# Patient Record
Sex: MALE | Race: WHITE | Employment: FULL TIME | URBAN - METROPOLITAN AREA
[De-identification: names, ages, dates, MRNs, and addresses within clinical notes are randomized per-mention and may not be internally consistent; named-entity substitution may affect disease eponyms.]

---

## 2018-02-09 ENCOUNTER — OFFICE VISIT (OUTPATIENT)
Dept: FAMILY MEDICINE CLINIC | Age: 53
End: 2018-02-09
Payer: COMMERCIAL

## 2018-02-09 VITALS
BODY MASS INDEX: 44.48 KG/M2 | HEIGHT: 65 IN | WEIGHT: 267 LBS | TEMPERATURE: 97.8 F | SYSTOLIC BLOOD PRESSURE: 168 MMHG | HEART RATE: 104 BPM | DIASTOLIC BLOOD PRESSURE: 82 MMHG | RESPIRATION RATE: 16 BRPM

## 2018-02-09 DIAGNOSIS — R80.9 TYPE 2 DIABETES MELLITUS WITH MICROALBUMINURIA, WITH LONG-TERM CURRENT USE OF INSULIN (HCC): Chronic | ICD-10-CM

## 2018-02-09 DIAGNOSIS — Z12.5 SCREENING PSA (PROSTATE SPECIFIC ANTIGEN): ICD-10-CM

## 2018-02-09 DIAGNOSIS — E11.69 ERECTILE DYSFUNCTION ASSOCIATED WITH TYPE 2 DIABETES MELLITUS (HCC): ICD-10-CM

## 2018-02-09 DIAGNOSIS — Z79.4 CONTROLLED TYPE 2 DIABETES MELLITUS WITHOUT COMPLICATION, WITH LONG-TERM CURRENT USE OF INSULIN (HCC): ICD-10-CM

## 2018-02-09 DIAGNOSIS — E11.9 CONTROLLED TYPE 2 DIABETES MELLITUS WITHOUT COMPLICATION, WITH LONG-TERM CURRENT USE OF INSULIN (HCC): ICD-10-CM

## 2018-02-09 DIAGNOSIS — F32.89 OTHER DEPRESSION: ICD-10-CM

## 2018-02-09 DIAGNOSIS — Z87.11 HISTORY OF PEPTIC ULCER DISEASE: ICD-10-CM

## 2018-02-09 DIAGNOSIS — Z00.00 ANNUAL PHYSICAL EXAM: Primary | ICD-10-CM

## 2018-02-09 DIAGNOSIS — E11.29 TYPE 2 DIABETES MELLITUS WITH MICROALBUMINURIA, WITH LONG-TERM CURRENT USE OF INSULIN (HCC): Chronic | ICD-10-CM

## 2018-02-09 DIAGNOSIS — Z12.11 SCREEN FOR COLON CANCER: ICD-10-CM

## 2018-02-09 DIAGNOSIS — E78.5 HYPERLIPIDEMIA, UNSPECIFIED HYPERLIPIDEMIA TYPE: Chronic | ICD-10-CM

## 2018-02-09 DIAGNOSIS — E11.42 TYPE 2 DIABETES MELLITUS WITH PERIPHERAL NEUROPATHY (HCC): Chronic | ICD-10-CM

## 2018-02-09 DIAGNOSIS — Z79.4 TYPE 2 DIABETES MELLITUS WITH MICROALBUMINURIA, WITH LONG-TERM CURRENT USE OF INSULIN (HCC): Chronic | ICD-10-CM

## 2018-02-09 DIAGNOSIS — G47.61 PERIODIC LIMB MOVEMENT DISORDER: ICD-10-CM

## 2018-02-09 DIAGNOSIS — I10 UNCONTROLLED HYPERTENSION, STAGE 1: ICD-10-CM

## 2018-02-09 DIAGNOSIS — N52.1 ERECTILE DYSFUNCTION ASSOCIATED WITH TYPE 2 DIABETES MELLITUS (HCC): ICD-10-CM

## 2018-02-09 PROCEDURE — 99386 PREV VISIT NEW AGE 40-64: CPT | Performed by: FAMILY MEDICINE

## 2018-02-09 PROCEDURE — 93000 ELECTROCARDIOGRAM COMPLETE: CPT | Performed by: FAMILY MEDICINE

## 2018-02-09 RX ORDER — FLUOXETINE HYDROCHLORIDE 20 MG/1
20 CAPSULE ORAL DAILY
COMMUNITY
End: 2018-02-09 | Stop reason: SDUPTHER

## 2018-02-09 RX ORDER — GLYBURIDE 5 MG/1
TABLET ORAL
Qty: 360 TABLET | Refills: 1 | Status: CANCELLED | OUTPATIENT
Start: 2018-02-09

## 2018-02-09 RX ORDER — SILDENAFIL CITRATE 20 MG/1
TABLET ORAL
Qty: 120 TABLET | Refills: 1 | Status: SHIPPED | OUTPATIENT
Start: 2018-02-09 | End: 2018-11-05 | Stop reason: SDUPTHER

## 2018-02-09 RX ORDER — LISINOPRIL 10 MG/1
TABLET ORAL
Qty: 90 TABLET | Refills: 1 | Status: SHIPPED | OUTPATIENT
Start: 2018-02-09 | End: 2018-07-09 | Stop reason: SDUPTHER

## 2018-02-09 RX ORDER — BLOOD SUGAR DIAGNOSTIC
STRIP MISCELLANEOUS
Qty: 200 EACH | Refills: 1 | Status: CANCELLED | OUTPATIENT
Start: 2018-02-09

## 2018-02-09 RX ORDER — CYCLOBENZAPRINE HCL 10 MG
10 TABLET ORAL 3 TIMES DAILY PRN
Qty: 270 TABLET | Refills: 1 | Status: CANCELLED | OUTPATIENT
Start: 2018-02-09

## 2018-02-09 RX ORDER — CYANOCOBALAMIN (VITAMIN B-12) 500 MCG
1 LOZENGE ORAL EVERY OTHER DAY
COMMUNITY
End: 2020-04-08 | Stop reason: CLARIF

## 2018-02-09 RX ORDER — FLUOXETINE HYDROCHLORIDE 20 MG/1
20 CAPSULE ORAL DAILY
Qty: 90 CAPSULE | Refills: 1 | Status: SHIPPED | OUTPATIENT
Start: 2018-02-09 | End: 2018-05-16

## 2018-02-09 RX ORDER — CYCLOBENZAPRINE HCL 10 MG
10 TABLET ORAL 3 TIMES DAILY PRN
COMMUNITY
End: 2018-02-09

## 2018-02-09 RX ORDER — RANITIDINE 150 MG/1
150 TABLET ORAL 2 TIMES DAILY
COMMUNITY
End: 2018-02-09

## 2018-02-09 RX ORDER — CETIRIZINE HYDROCHLORIDE 10 MG/1
10 TABLET ORAL DAILY PRN
COMMUNITY
End: 2019-09-25

## 2018-02-09 RX ORDER — OMEPRAZOLE 20 MG/1
CAPSULE, DELAYED RELEASE ORAL
Qty: 180 CAPSULE | Refills: 1 | Status: SHIPPED | OUTPATIENT
Start: 2018-02-09 | End: 2018-11-05 | Stop reason: SDUPTHER

## 2018-02-09 RX ORDER — PRAVASTATIN SODIUM 40 MG
TABLET ORAL
Qty: 90 TABLET | Refills: 1 | Status: SHIPPED | OUTPATIENT
Start: 2018-02-09 | End: 2018-08-09 | Stop reason: SDUPTHER

## 2018-02-09 RX ORDER — DOXYCYCLINE 100 MG/1
100 CAPSULE ORAL 2 TIMES DAILY
Qty: 180 CAPSULE | Refills: 1 | Status: SHIPPED | OUTPATIENT
Start: 2018-02-09 | End: 2018-07-23 | Stop reason: SDUPTHER

## 2018-02-09 RX ORDER — CHOLECALCIFEROL (VITAMIN D3) 25 MCG
TABLET,CHEWABLE ORAL
COMMUNITY
End: 2018-07-16

## 2018-02-09 RX ORDER — PRAMIPEXOLE DIHYDROCHLORIDE 0.12 MG/1
0.5 TABLET ORAL DAILY
Qty: 360 TABLET | Refills: 1 | Status: SHIPPED | OUTPATIENT
Start: 2018-02-09 | End: 2018-05-16

## 2018-02-09 RX ORDER — DOXYCYCLINE 100 MG/1
100 CAPSULE ORAL 2 TIMES DAILY
COMMUNITY
End: 2018-02-09 | Stop reason: SDUPTHER

## 2018-02-09 RX ORDER — MULTIVITAMIN/IRON/FOLIC ACID 18MG-0.4MG
TABLET ORAL
COMMUNITY
End: 2019-02-07

## 2018-02-09 RX ORDER — LANCETS 28 GAUGE
1 EACH MISCELLANEOUS DAILY
COMMUNITY
End: 2018-07-16

## 2018-02-09 ASSESSMENT — PATIENT HEALTH QUESTIONNAIRE - PHQ9
SUM OF ALL RESPONSES TO PHQ QUESTIONS 1-9: 0
1. LITTLE INTEREST OR PLEASURE IN DOING THINGS: 0
2. FEELING DOWN, DEPRESSED OR HOPELESS: 0
SUM OF ALL RESPONSES TO PHQ9 QUESTIONS 1 & 2: 0

## 2018-02-09 NOTE — PROGRESS NOTES
Subjective:      Patient ID: Margot Cook is a 47 y.o. male. Chief Complaint   Patient presents with   [facility]     Previous PCP was Dr Gaston Jordan with [facility]. Needed to switch because he is now a [facility] [address]. Referred here by his mom Mandy Morales.  Annual Exam     He is not fasting for BW. Does not watch diet. Does not exercise. CRC screen - never. HPI    Kiel Escobedo is here today for annual exam and establish    He was seeing other physicians at [facility] and didn't feel like he was being listened to      His diabetic care never been good his last hemoglobin A1c before meals is over 12      We did review a lot of his health maintenance issues and past history and care everywhere    He is not fasting for blood work does not watch his diet and does not exercise as there had a screening for colorectal cancer      No shortness of breath with activities no chest pain with activities no nausea no vomiting no black stool velocity is systems negative  Past medical surgical history also        Reviewed every medication        No Known Allergies  Outpatient Encounter Prescriptions as of 2/9/2018   Medication Sig Dispense Refill    VITAMIN E PO Take by mouth      Magnesium Oxide 250 MG TABS Take by mouth      Cyanocobalamin (B-12) 2500 MCG TABS Take by mouth      Multiple Vitamins-Minerals (ONE-A-DAY MENS 50+ ADVANTAGE PO) Take by mouth      Cholecalciferol (VITAMIN D3) 1000 units CAPS Take by mouth      cetirizine (ZYRTEC) 10 MG tablet Take 10 mg by mouth daily      FREESTYLE LANCETS MISC 1 each by Does not apply route daily      glucose blood VI test strips (FREESTYLE TEST STRIPS) strip 1 each by In Vitro route daily As needed.       FLUoxetine (PROZAC) 20 MG capsule Take 1 capsule by mouth daily 90 capsule 1    doxycycline monohydrate (MONODOX) 100 MG capsule Take 1 capsule by mouth 2 times daily 180 capsule 1    pravastatin (PRAVACHOL) 40 MG tablet TAKE ONE TABLET BY MOUTH ONE TABLET BY MOUTH ONE TIME DAILY 90 tablet 3    [DISCONTINUED] insulin 70-30 (HUMULIN;NOVOLIN) (70-30) 100 UNIT/ML injection INJECT 30 UNITS BEFORE BREAKFAST AND 30 UNITS BEFORE DINNER 4 vial 3    [DISCONTINUED] glyBURIDE (DIABETA) 5 MG tablet TAKE 2 TABLETS BY MOUTH TWICE DAILY FOR DIABETES 360 tablet 3    [DISCONTINUED] metFORMIN (GLUCOPHAGE) 1000 MG tablet Take one tablet (1000mg) by mouth twice daily (note this is same dose as before, just different strength tablet) 120 tablet 3    [DISCONTINUED] omeprazole (PRILOSEC) 20 MG capsule TAKE 1 CAPSULE BY MOUTH TWICE DAILY FOR STOMACH 180 capsule 3    [DISCONTINUED] sildenafil (REVATIO) 20 MG tablet TAKE 5 TABLETS AS NEEDED FOR ERECTILE DYSFUNCTION 120 tablet 0    [DISCONTINUED] Glucose Blood (BLOOD GLUCOSE TEST STRIPS) STRP USE AS DIRECTED FOR DIABETES 100 5     No facility-administered encounter medications on file as of 2/9/2018. Social History     Social History    Marital status: Unknown     Spouse name: N/A    Number of children: N/A    Years of education: N/A     Occupational History    Not on file. Social History Main Topics    Smoking status: Current Every Day Smoker     Packs/day: 1.00    Smokeless tobacco: Never Used    Alcohol use No    Drug use: No    Sexual activity: Not on file     Other Topics Concern    Not on file     Social History Narrative    No narrative on file     No family history on file. No past medical history on file. No past surgical history on file. REVIEW OF SYSTEMS:   Patient seen today for exam.  Denies any problems with hearing, headaches or vision. Denies any shortness of breath, chest pain, nausea or vomiting. No black stool, no blood in the stool. No heartburn. Denies any problems with constipation or diarrhea either. No dysuria type symptoms.               Objective:     BP (!) 168/82 (Site: Left Arm, Position: Sitting, Cuff Size: Medium Adult)   Pulse 104   Temp 97.8 °F (36.6 °C) (Temporal) Resp 16   Ht 5' 5\" (1.651 m)   Wt 267 lb (121.1 kg)   BMI 44.43 kg/m²     Physical Exam        O:  Alert and active male in no acute distress  HEENT:  TMs clear. Pharynx neg. Nares clear, no drainage noted  Neck supple/ no adenopathy   HEART:  RRR without murmur/ no carotid bruits  LUNGS:  Clear to auscultation bilaterally, no wheeze or rhonchi noted  THYROID: neg masses or nodularity  ABDOMEN:  Soft x4. Bowel sounds positive. No masses or organomegaly,  Negative tenderness, guarding or rebound. EXTR:  Without edema./ good pulses bilat    Neurologic exam unremarkable. DTRs in upper and lower extremities within normal limits. Full strength noted    Skin- no lesions noted       DIABETIC FOOT EXAM:  Bilateral feet were examined here today with normal pulses anteriorly and posteriorly at the dorsalis pedis. No callus, fissuring or sores noted. Sensation intact. Fine pin prick testing was within normal limits    Assessment:      1. Annual physical exam  Lipid Panel    CBC With Auto Differential    Comprehensive Metabolic Panel    EKG 12 lead   2. Type 2 diabetes mellitus with microalbuminuria, with long-term current use of insulin (HCC)  Hemoglobin A1C    Microalbumin / Creatinine Urine Ratio   3. Hyperlipidemia, unspecified hyperlipidemia type     4. Screening PSA (prostate specific antigen)  PSA Screening   5. Screen for colon cancer  COLOGUARD   6. Type 2 diabetes mellitus with peripheral neuropathy (HCC)  pravastatin (PRAVACHOL) 40 MG tablet    lisinopril (PRINIVIL;ZESTRIL) 10 MG tablet   7. Periodic limb movement disorder  pramipexole (MIRAPEX) 0.125 MG tablet   8. Uncontrolled type 2 diabetes mellitus without complication, with long-term current use of insulin ([address]     9. Controlled type 2 diabetes mellitus without complication, with long-term current use of insulin ([facility])  metFORMIN (GLUCOPHAGE) 1000 MG tablet   10.  History of peptic ulcer disease  omeprazole (PRILOSEC) 20 MG delayed release capsule

## 2018-02-10 DIAGNOSIS — E11.29 TYPE 2 DIABETES MELLITUS WITH MICROALBUMINURIA, WITH LONG-TERM CURRENT USE OF INSULIN (HCC): Chronic | ICD-10-CM

## 2018-02-10 DIAGNOSIS — Z79.4 TYPE 2 DIABETES MELLITUS WITH MICROALBUMINURIA, WITH LONG-TERM CURRENT USE OF INSULIN (HCC): Chronic | ICD-10-CM

## 2018-02-10 DIAGNOSIS — Z12.5 SCREENING PSA (PROSTATE SPECIFIC ANTIGEN): ICD-10-CM

## 2018-02-10 DIAGNOSIS — Z00.00 ANNUAL PHYSICAL EXAM: ICD-10-CM

## 2018-02-10 DIAGNOSIS — R80.9 TYPE 2 DIABETES MELLITUS WITH MICROALBUMINURIA, WITH LONG-TERM CURRENT USE OF INSULIN (HCC): Chronic | ICD-10-CM

## 2018-02-10 LAB
ALBUMIN SERPL-MCNC: 4.4 G/DL (ref 3.9–4.9)
ALP BLD-CCNC: 59 U/L (ref 35–104)
ALT SERPL-CCNC: 29 U/L (ref 0–41)
ANION GAP SERPL CALCULATED.3IONS-SCNC: 14 MEQ/L (ref 7–13)
AST SERPL-CCNC: 21 U/L (ref 0–40)
BASOPHILS ABSOLUTE: 0.1 K/UL (ref 0–0.2)
BASOPHILS RELATIVE PERCENT: 1 %
BILIRUB SERPL-MCNC: 0.4 MG/DL (ref 0–1.2)
BUN BLDV-MCNC: 13 MG/DL (ref 6–20)
CALCIUM SERPL-MCNC: 9.5 MG/DL (ref 8.6–10.2)
CHLORIDE BLD-SCNC: 99 MEQ/L (ref 98–107)
CHOLESTEROL, TOTAL: 144 MG/DL (ref 0–199)
CO2: 25 MEQ/L (ref 22–29)
CREAT SERPL-MCNC: 0.58 MG/DL (ref 0.7–1.2)
CREATININE URINE: 117.5 MG/DL
EOSINOPHILS ABSOLUTE: 0.3 K/UL (ref 0–0.7)
EOSINOPHILS RELATIVE PERCENT: 2.8 %
GFR AFRICAN AMERICAN: >60
GFR NON-AFRICAN AMERICAN: >60
GLOBULIN: 2.6 G/DL (ref 2.3–3.5)
GLUCOSE BLD-MCNC: 147 MG/DL (ref 74–109)
HBA1C MFR BLD: 10.1 % (ref 4.8–5.9)
HCT VFR BLD CALC: 51.7 % (ref 42–52)
HDLC SERPL-MCNC: 36 MG/DL (ref 40–59)
HEMOGLOBIN: 17.5 G/DL (ref 14–18)
LDL CHOLESTEROL CALCULATED: 70 MG/DL (ref 0–129)
LYMPHOCYTES ABSOLUTE: 3 K/UL (ref 1–4.8)
LYMPHOCYTES RELATIVE PERCENT: 28.6 %
MCH RBC QN AUTO: 30.3 PG (ref 27–31.3)
MCHC RBC AUTO-ENTMCNC: 33.8 % (ref 33–37)
MCV RBC AUTO: 89.6 FL (ref 80–100)
MICROALBUMIN UR-MCNC: 16.6 MG/DL
MICROALBUMIN/CREAT UR-RTO: 141.3 MG/G (ref 0–30)
MONOCYTES ABSOLUTE: 1 K/UL (ref 0.2–0.8)
MONOCYTES RELATIVE PERCENT: 9.1 %
NEUTROPHILS ABSOLUTE: 6.1 K/UL (ref 1.4–6.5)
NEUTROPHILS RELATIVE PERCENT: 58.5 %
PDW BLD-RTO: 14.1 % (ref 11.5–14.5)
PLATELET # BLD: 208 K/UL (ref 130–400)
POTASSIUM SERPL-SCNC: 4.5 MEQ/L (ref 3.5–5.1)
PROSTATE SPECIFIC ANTIGEN: 0.2 NG/ML (ref 0–3.89)
RBC # BLD: 5.77 M/UL (ref 4.7–6.1)
SODIUM BLD-SCNC: 138 MEQ/L (ref 132–144)
TOTAL PROTEIN: 7 G/DL (ref 6.4–8.1)
TRIGL SERPL-MCNC: 191 MG/DL (ref 0–200)
WBC # BLD: 10.5 K/UL (ref 4.8–10.8)

## 2018-02-13 ENCOUNTER — CARE COORDINATION (OUTPATIENT)
Dept: CARE COORDINATION | Age: 53
End: 2018-02-13

## 2018-02-13 NOTE — CARE COORDINATION
patient can engage in healthcare discussions? (Barriers include language, deafness, aphasia, alcohol or drug problems, learning difficulties, concentration):  Clear and open communication, no identified barriers   Do other services need to be involved to help this patient?:  Other care/services not required at this time   Are current services involved with this patient well-coordinated? (Include coordination with other services you are now recommendation): All required care/services in place and well-coordinated   Suggested Interventions and Community Resources  Diabetes Education:  Completed (Comment: approx. 8 yrs ago)    Other Services or Interventions:  Provided with and reviewed [address] [place] [address] Program information/benefits   Zone Management Tools: In Process         Set up/Review Goals, Set up/Review an Education Plan              Prior to Admission medications    Medication Sig Start Date End Date Taking?  Authorizing Provider   VITAMIN E PO Take by mouth   Yes Historical Provider, MD   Magnesium Oxide 250 MG TABS Take by mouth   Yes Historical Provider, MD   Cyanocobalamin (B-12) 2500 MCG TABS Take by mouth   Yes Historical Provider, MD   Multiple Vitamins-Minerals (ONE-A-DAY MENS 50+ ADVANTAGE PO) Take by mouth   Yes Historical Provider, MD   Cholecalciferol (VITAMIN D3) 1000 units CAPS Take by mouth   Yes Historical Provider, MD   cetirizine (ZYRTEC) 10 MG tablet Take 10 mg by mouth daily   Yes Historical Provider, MD   FLUoxetine (PROZAC) 20 MG capsule Take 1 capsule by mouth daily 2/9/18  Yes Rahul Lundberg, DO   doxycycline monohydrate (MONODOX) 100 MG capsule Take 1 capsule by mouth 2 times daily 2/9/18  Yes Meng Kapoor, DO   pravastatin (PRAVACHOL) 40 MG tablet TAKE ONE TABLET BY MOUTH ONE TIME A DAY AT BEDTIME FOR CHOLESTEROL 2/9/18  Yes Meng Kapoor DO   pramipexole (MIRAPEX) 0.125 MG tablet Take 4 tablets by mouth daily 2/9/18  Yes Meng Kapoor, DO   lisinopril

## 2018-04-11 ENCOUNTER — CARE COORDINATION (OUTPATIENT)
Dept: CARE COORDINATION | Age: 53
End: 2018-04-11

## 2018-04-30 ENCOUNTER — CLINICAL DOCUMENTATION (OUTPATIENT)
Dept: PHARMACY | Facility: CLINIC | Age: 53
End: 2018-04-30

## 2018-05-16 ENCOUNTER — OFFICE VISIT (OUTPATIENT)
Dept: FAMILY MEDICINE CLINIC | Age: 53
End: 2018-05-16
Payer: COMMERCIAL

## 2018-05-16 VITALS
HEART RATE: 88 BPM | HEIGHT: 65 IN | BODY MASS INDEX: 44.48 KG/M2 | RESPIRATION RATE: 18 BRPM | SYSTOLIC BLOOD PRESSURE: 134 MMHG | TEMPERATURE: 98.1 F | WEIGHT: 267 LBS | DIASTOLIC BLOOD PRESSURE: 72 MMHG

## 2018-05-16 DIAGNOSIS — E11.29 TYPE 2 DIABETES MELLITUS WITH MICROALBUMINURIA, WITH LONG-TERM CURRENT USE OF INSULIN (HCC): Primary | Chronic | ICD-10-CM

## 2018-05-16 DIAGNOSIS — R80.9 TYPE 2 DIABETES MELLITUS WITH MICROALBUMINURIA, WITH LONG-TERM CURRENT USE OF INSULIN (HCC): Primary | Chronic | ICD-10-CM

## 2018-05-16 DIAGNOSIS — Z79.4 TYPE 2 DIABETES MELLITUS WITH MICROALBUMINURIA, WITH LONG-TERM CURRENT USE OF INSULIN (HCC): Primary | Chronic | ICD-10-CM

## 2018-05-16 LAB — HBA1C MFR BLD: 9.8 %

## 2018-05-16 PROCEDURE — 99213 OFFICE O/P EST LOW 20 MIN: CPT | Performed by: FAMILY MEDICINE

## 2018-05-16 PROCEDURE — 83036 HEMOGLOBIN GLYCOSYLATED A1C: CPT | Performed by: FAMILY MEDICINE

## 2018-06-05 DIAGNOSIS — E11.69 ERECTILE DYSFUNCTION ASSOCIATED WITH TYPE 2 DIABETES MELLITUS (HCC): ICD-10-CM

## 2018-06-05 DIAGNOSIS — Z87.11 HISTORY OF PEPTIC ULCER DISEASE: ICD-10-CM

## 2018-06-05 DIAGNOSIS — N52.1 ERECTILE DYSFUNCTION ASSOCIATED WITH TYPE 2 DIABETES MELLITUS (HCC): ICD-10-CM

## 2018-06-05 DIAGNOSIS — E11.42 TYPE 2 DIABETES MELLITUS WITH PERIPHERAL NEUROPATHY (HCC): Chronic | ICD-10-CM

## 2018-06-05 RX ORDER — OMEPRAZOLE 20 MG/1
CAPSULE, DELAYED RELEASE ORAL
Qty: 180 CAPSULE | Refills: 1 | Status: CANCELLED | OUTPATIENT
Start: 2018-06-05

## 2018-06-05 RX ORDER — DOXYCYCLINE 100 MG/1
100 CAPSULE ORAL 2 TIMES DAILY
Qty: 180 CAPSULE | Refills: 1 | Status: CANCELLED | OUTPATIENT
Start: 2018-06-05

## 2018-06-05 RX ORDER — LISINOPRIL 10 MG/1
TABLET ORAL
Qty: 90 TABLET | Refills: 1 | Status: CANCELLED | OUTPATIENT
Start: 2018-06-05

## 2018-06-05 RX ORDER — SILDENAFIL CITRATE 20 MG/1
TABLET ORAL
Qty: 120 TABLET | Refills: 1 | Status: CANCELLED | OUTPATIENT
Start: 2018-06-05

## 2018-06-05 RX ORDER — PRAVASTATIN SODIUM 40 MG
TABLET ORAL
Qty: 90 TABLET | Refills: 1 | Status: CANCELLED | OUTPATIENT
Start: 2018-06-05

## 2018-06-27 ENCOUNTER — CLINICAL DOCUMENTATION (OUTPATIENT)
Dept: PHARMACY | Facility: CLINIC | Age: 53
End: 2018-06-27

## 2018-06-27 ENCOUNTER — ENROLLMENT (OUTPATIENT)
Dept: PHARMACY | Facility: CLINIC | Age: 53
End: 2018-06-27

## 2018-07-09 DIAGNOSIS — E11.42 TYPE 2 DIABETES MELLITUS WITH PERIPHERAL NEUROPATHY (HCC): Chronic | ICD-10-CM

## 2018-07-09 RX ORDER — LISINOPRIL 10 MG/1
TABLET ORAL
Qty: 90 TABLET | Refills: 1 | Status: SHIPPED | OUTPATIENT
Start: 2018-07-09 | End: 2018-11-05 | Stop reason: SDUPTHER

## 2018-07-13 ENCOUNTER — CARE COORDINATION (OUTPATIENT)
Dept: CARE COORDINATION | Age: 53
End: 2018-07-13

## 2018-07-13 NOTE — CARE COORDINATION
Risk Screening, did the patient have 2 or more falls or 1 fall with injury in the past year?:  No     Frequent urination at night?:  No  Do you use rails/bars?:  No  Do you have a non-slip tub mat?:  Yes     Are you experiencing loss of meaning?:  No  Are you experiencing loss of hope and peace?:  No     Thinking about your patient's physical health needs, are there any symptoms or problems (risk indicators) you are unsure about that require further investigation?:  No identified areas of uncertainly or problems already being investigated   Are the patients physical health problems impacting on their mental well-being?:  No identified areas of concern   Are there any problems with your patients lifestyle behaviors (alcohol, drugs, diet, exercise) that are impacting on physical or mental well-being?:  No identified areas of concern   Do you have any other concerns about your patients mental well-being?  How would you rate their severity and impact on the patient?:  No identified areas of concern   How would you rate their home environment in terms of safety and stability (including domestic violence, insecure housing, neighbor harassment)?:  Consistently safe, supportive, stable, no identified problems   How do daily activities impact on the patient's well-being? (include current or anticipated unemployment, work, caregiving, access to transportation or other):  No identified problems or perceived positive benefits   How would you rate their social network (family, work, friends)?:  Good participation with social networks   How would you rate their financial resources (including ability to afford all required medical care)?:  Financially secure, resources adequate, no identified problems   How wells does the patient now understand their health and well-being (symptoms, signs or risk factors) and what they need to do to manage their health?:  Reasonable to good understanding and already engages in managing health or is willing to undertake better management   How well do you think your patient can engage in healthcare discussions? (Barriers include language, deafness, aphasia, alcohol or drug problems, learning difficulties, concentration):  Clear and open communication, no identified barriers   Do other services need to be involved to help this patient?:  Other care/services not required at this time   Are current services involved with this patient well-coordinated? (Include coordination with other services you are now recommendation): All required care/services in place and well-coordinated   Suggested Interventions and Community Resources  Diabetes Education: In Process (Comment: Is aware will need to meet with Diabeets Educater before end of year)    Other Services or Interventions:  Provided with and reviewed [email or web address] Diabetes Management Program information/benefits   Medication Assistance Program:  In Process   Pharmacist:  In Process   Registered Dietician: In Process   Zone Management Tools:  Completed         Set up/Review Goals, Set up/Review an Education Plan              Prior to Admission medications    Medication Sig Start Date End Date Taking?  Authorizing Provider   lisinopril (PRINIVIL;ZESTRIL) 10 MG tablet TAKE 1 TABLET BY MOUTH ONE TIME DAILY 7/9/18  Yes Mariella Osman, DO   Insulin Pen Needle (NOVOFINE PLUS) 32G X 4 MM MISC 1 each by Does not apply route daily 7/9/18  Yes Meng Kapoor, DO   linagliptin-metformin 2.5-1000 MG TABS Take 1 tablet by mouth 2 times daily (replaces Tradjenta and Metformin) 7/9/18  Yes Meng Kapoor, DO   Insulin Degludec (TRESIBA FLEXTOUCH) 100 UNIT/ML SOPN 60 units each 6/6/18  Yes Meng Kapoor, DO   VITAMIN E PO Take by mouth   Yes Historical Provider, MD   Magnesium Oxide 250 MG TABS Take by mouth   Yes Historical Provider, MD   Cyanocobalamin (B-12) 2500 MCG TABS Take by mouth   Yes Historical Provider, MD   Multiple Vitamins-Minerals (ONE-A-DAY MENS 50+ concern?:  No

## 2018-07-16 ENCOUNTER — TELEPHONE (OUTPATIENT)
Dept: FAMILY MEDICINE CLINIC | Age: 53
End: 2018-07-16

## 2018-07-16 ENCOUNTER — SCHEDULED TELEPHONE ENCOUNTER (OUTPATIENT)
Dept: PHARMACY | Facility: CLINIC | Age: 53
End: 2018-07-16

## 2018-07-16 DIAGNOSIS — E11.8 TYPE 2 DIABETES MELLITUS WITH COMPLICATION, UNSPECIFIED LONG TERM INSULIN USE STATUS: ICD-10-CM

## 2018-07-16 RX ORDER — LANCETS 33 GAUGE
EACH MISCELLANEOUS
Qty: 200 EACH | Refills: 3 | Status: CANCELLED | OUTPATIENT
Start: 2018-07-16

## 2018-07-16 RX ORDER — ASPIRIN 81 MG/1
81 TABLET ORAL DAILY
Qty: 90 TABLET | Refills: 1 | Status: SHIPPED | OUTPATIENT
Start: 2018-07-16 | End: 2019-03-30 | Stop reason: SDUPTHER

## 2018-07-16 RX ORDER — ASPIRIN 81 MG/1
81 TABLET ORAL DAILY
Qty: 100 TABLET | Refills: 3 | Status: CANCELLED | OUTPATIENT
Start: 2018-07-16

## 2018-07-16 RX ORDER — BLOOD-GLUCOSE METER
EACH MISCELLANEOUS
Qty: 1 KIT | Refills: 0 | Status: CANCELLED | OUTPATIENT
Start: 2018-07-16

## 2018-07-16 RX ORDER — LANCETS 33 GAUGE
EACH MISCELLANEOUS
Qty: 300 EACH | Refills: 1 | Status: SHIPPED | OUTPATIENT
Start: 2018-07-16 | End: 2019-02-08 | Stop reason: ALTCHOICE

## 2018-07-16 RX ORDER — BLOOD-GLUCOSE METER
EACH MISCELLANEOUS
Qty: 1 DEVICE | Refills: 0 | Status: SHIPPED | OUTPATIENT
Start: 2018-07-16 | End: 2019-02-08 | Stop reason: ALTCHOICE

## 2018-07-16 NOTE — TELEPHONE ENCOUNTER
Erick Lerma DO,  Your patient is currently enrolled in [address]   Please assist, orders pended in this encounter, for your signature/modification if you agree:  · Formulary conversion: Tresiba 60 units SQ daily to Lantus 60u SQ nightly (note: Lantus OR Toujeo are eligible for copay waiver to $0 for patient through mail order pharmacy)  · Refill requests: Agamatrix Presto BG meter/strips/lancets, aspirin (eligible for copay waiver to $0 for patient through mail order pharmacy)    Thank you,  Gray Figueroa, PharmD, [address] [facility]  Direct: [phone]  Department, toll free: [phone], option 7

## 2018-07-24 RX ORDER — DOXYCYCLINE 100 MG/1
100 CAPSULE ORAL 2 TIMES DAILY
Qty: 180 CAPSULE | Refills: 1 | Status: SHIPPED | OUTPATIENT
Start: 2018-07-24 | End: 2018-08-20 | Stop reason: ALTCHOICE

## 2018-08-09 DIAGNOSIS — E11.42 TYPE 2 DIABETES MELLITUS WITH PERIPHERAL NEUROPATHY (HCC): Chronic | ICD-10-CM

## 2018-08-09 RX ORDER — PRAVASTATIN SODIUM 40 MG
TABLET ORAL
Qty: 90 TABLET | Refills: 1 | Status: SHIPPED | OUTPATIENT
Start: 2018-08-09 | End: 2018-12-31 | Stop reason: SDUPTHER

## 2018-08-09 NOTE — TELEPHONE ENCOUNTER
From: Cali Mcfarlane  Sent: 8/9/2018 7:49 AM EDT  Subject: Medication Renewal Request    Cali Mcfarlane would like a refill of the following medications:     pravastatin (PRAVACHOL) 40 MG tablet [Meng Kapoor, ]   Patient Comment: Just filled my 2 weeks pill containers and only have a few pills remaining     linagliptin-metformin 2.5-1000 MG TABS [Meng Kapoor, DO]   Patient Comment: Just filled my 2 weeks pill containers and only have a few pills remaining    Preferred pharmacy: [address] [place] [address] -  [phone]

## 2018-08-16 ENCOUNTER — OFFICE VISIT (OUTPATIENT)
Dept: FAMILY MEDICINE CLINIC | Age: 53
End: 2018-08-16
Payer: COMMERCIAL

## 2018-08-16 VITALS
DIASTOLIC BLOOD PRESSURE: 80 MMHG | OXYGEN SATURATION: 96 % | HEART RATE: 106 BPM | BODY MASS INDEX: 44.04 KG/M2 | SYSTOLIC BLOOD PRESSURE: 132 MMHG | WEIGHT: 264.31 LBS | RESPIRATION RATE: 12 BRPM | HEIGHT: 65 IN | TEMPERATURE: 96.8 F

## 2018-08-16 DIAGNOSIS — Z79.4 TYPE 2 DIABETES MELLITUS WITH MICROALBUMINURIA, WITH LONG-TERM CURRENT USE OF INSULIN (HCC): Primary | Chronic | ICD-10-CM

## 2018-08-16 DIAGNOSIS — E11.29 TYPE 2 DIABETES MELLITUS WITH MICROALBUMINURIA, WITH LONG-TERM CURRENT USE OF INSULIN (HCC): Primary | Chronic | ICD-10-CM

## 2018-08-16 DIAGNOSIS — R80.9 TYPE 2 DIABETES MELLITUS WITH MICROALBUMINURIA, WITH LONG-TERM CURRENT USE OF INSULIN (HCC): Primary | Chronic | ICD-10-CM

## 2018-08-16 LAB — HBA1C MFR BLD: 9.5 %

## 2018-08-16 PROCEDURE — 83036 HEMOGLOBIN GLYCOSYLATED A1C: CPT | Performed by: FAMILY MEDICINE

## 2018-08-16 PROCEDURE — 99213 OFFICE O/P EST LOW 20 MIN: CPT | Performed by: FAMILY MEDICINE

## 2018-08-16 RX ORDER — AMOXICILLIN AND CLAVULANATE POTASSIUM 875; 125 MG/1; MG/1
1 TABLET, FILM COATED ORAL 2 TIMES DAILY
Qty: 20 TABLET | Refills: 1 | Status: SHIPPED | OUTPATIENT
Start: 2018-08-16 | End: 2018-08-26

## 2018-08-16 NOTE — PROGRESS NOTES
Subjective:      Patient ID: Parvin Warren is a 47 y.o. male. Chief Complaint   Patient presents with    Diabetes     Taking medications as prescribed. Watches diet. Checks glucose at home occasionally. HPI    Here today follow-up his diabetes take his medication as prescribed and does watch his diet      Unfortunately his A1c was not as good as we want he did not hear the instructions were gave him last time so hasn't changed anything he did go from [facility] about 2 Lantus and has made things worse     no shortness of breath chest pain nausea vomiting       No Known Allergies  Outpatient Encounter Prescriptions as of 8/16/2018   Medication Sig Dispense Refill    amoxicillin-clavulanate (AUGMENTIN) 875-125 MG per tablet Take 1 tablet by mouth 2 times daily for 10 days 20 tablet 1    empagliflozin (JARDIANCE) 10 MG tablet Take 1 tablet by mouth daily 30 tablet 3    pravastatin (PRAVACHOL) 40 MG tablet TAKE ONE TABLET BY MOUTH ONE TIME A DAY AT BEDTIME FOR CHOLESTEROL 90 tablet 1    linagliptin-metformin 2.5-1000 MG TABS Take 1 tablet by mouth 2 times daily (replaces Tradjenta and Metformin) 60 tablet 3    doxycycline monohydrate (MONODOX) 100 MG capsule Take 1 capsule by mouth 2 times daily 180 capsule 1    aspirin EC 81 MG EC tablet Take 1 tablet by mouth daily 90 tablet 1    insulin glargine (LANTUS SOLOSTAR) 100 UNIT/ML injection pen Inject 60 Units into the skin nightly 15 pen 1    Blood Glucose Monitoring Suppl (AGAMATRIX AMP) MEÑO Checks 2-3 times daily. IDDM--ICD-10 E11.9 1 Device 0    blood glucose test strips (AGAMATRIX AMP TEST) strip Checks 2-3 times daily. IDDM--ICD-10 E11.9 300 each 1    AGAMATRIX ULTRA-THIN LANCETS MISC Checks 2-3 times daily.  IDDM--ICD-10 E11.9 300 each 1    lisinopril (PRINIVIL;ZESTRIL) 10 MG tablet TAKE 1 TABLET BY MOUTH ONE TIME DAILY 90 tablet 1    Insulin Pen Needle (NOVOFINE PLUS) 32G X 4 MM MISC 1 each by Does not apply route daily 200 each 1    VITAMIN E PO Take by mouth      Magnesium Oxide 250 MG TABS Take by mouth      cetirizine (ZYRTEC) 10 MG tablet Take 10 mg by mouth daily      omeprazole (PRILOSEC) 20 MG delayed release capsule TAKE 1 CAPSULE BY MOUTH TWICE DAILY FOR STOMACH 180 capsule 1    sildenafil (REVATIO) 20 MG tablet TAKE 5 TABLETS AS NEEDED FOR ERECTILE DYSFUNCTION 120 tablet 1     No facility-administered encounter medications on file as of 8/16/2018. Social History     Social History    Marital status: Unknown     Spouse name: N/A    Number of children: N/A    Years of education: N/A     Occupational History    Not on file. Social History Main Topics    Smoking status: Current Every Day Smoker     Packs/day: 1.00    Smokeless tobacco: Never Used    Alcohol use No    Drug use: No    Sexual activity: Not on file     Other Topics Concern    Not on file     Social History Narrative    No narrative on file     History reviewed. No pertinent family history. History reviewed. No pertinent past medical history. History reviewed. No pertinent surgical history. REVIEW OF SYSTEMS:   Patient seen today for exam.  Denies any problems with hearing, headaches or vision. Denies any shortness of breath, chest pain, nausea or vomiting. No black stool, no blood in the stool. No heartburn. Denies any problems with constipation or diarrhea either. No dysuria type symptoms. Objective:     /80 (Site: Left Arm, Position: Sitting, Cuff Size: Large Adult)   Pulse 106   Temp 96.8 °F (36 °C) (Temporal)   Resp 12   Ht 5' 5\" (1.651 m)   Wt 264 lb 5 oz (119.9 kg)   SpO2 96%   BMI 43.98 kg/m²     Physical Exam        O:  Alert and active male in no acute distress  HEENT:  TMs clear. Pharynx neg.  Nares clear, no drainage noted  Neck supple/ no adenopathy   HEART:  RRR without murmur/ no carotid bruits  LUNGS:  Clear to auscultation bilaterally, no wheeze or rhonchi noted  THYROID: neg masses or nodularity  ABDOMEN: Soft x4. Bowel sounds positive. No masses or organomegaly,  Negative tenderness, guarding or rebound. EXTR:  Without edema./ good pulses bilat    Neurologic exam unremarkable. DTRs in upper and lower extremities within normal limits. Full strength noted    Skin- no lesions noted       Assessment:       Diagnosis Orders   1. Type 2 diabetes mellitus with microalbuminuria, with long-term current use of insulin (HCC)  POCT glycosylated hemoglobin (Hb A1C)             Plan:        Orders Placed This Encounter   Medications    amoxicillin-clavulanate (AUGMENTIN) 875-125 MG per tablet     Sig: Take 1 tablet by mouth 2 times daily for 10 days     Dispense:  20 tablet     Refill:  1    empagliflozin (JARDIANCE) 10 MG tablet     Sig: Take 1 tablet by mouth daily     Dispense:  30 tablet     Refill:  3     Orders Placed This Encounter   Procedures    POCT glycosylated hemoglobin (Hb A1C)           Health Maintenance Due   Topic Date Due    Diabetic foot exam  09/05/1976    HIV screen  09/05/1981    Shingles Vaccine (1 of 2 - 2 Dose Series) 09/05/2016    Colon cancer screen colonoscopy  09/05/2016        will add Jardiance everyday living doesn't next 2 weeks sooner if any problems      Controlled Substances Monitoring:     No flowsheet data found.         If anything worsens or changes please call us at once , follow-up in the office as planned,

## 2018-08-16 NOTE — PATIENT INSTRUCTIONS
smoking, you improve the health of everyone who now breathes in your smoke. · Their heart, lung, and cancer risks drop, much like yours. · They are sick less. For babies and small children, living smoke-free means they're less likely to have ear infections, pneumonia, and bronchitis. · If you're a woman who is or will be pregnant someday, quitting smoking means a healthier . · Children who are close to you are less likely to become adult smokers. Where can you learn more? Go to https://[employer].[place] SPARK. org and sign in to your [employer] account. Enter [phone] in the [facility] box to learn more about \"Learning About Benefits From Quitting Smoking. \"     If you do not have an account, please click on the \"Sign Up Now\" link. Current as of: 2017  Content Version: 11.7  © 2252-4457 [email or web address]. Care instructions adapted under license by [facility] ([facility]). If you have questions about a medical condition or this instruction, always ask your healthcare professional. [place] [address] any warranty or liability for your use of this information. Patient Education        Deciding About Using Medicines To Quit Smoking  Deciding About Using Medicines To Quit Smoking  What are the medicines you can use? Your doctor may prescribe varenicline (Chantix) or bupropion (Zyban). These medicines can help you cope with cravings for tobacco. They are pills that don't contain nicotine. You also can use nicotine replacement products. These do contain nicotine. There are many types. · Gum and lozenges slowly release nicotine into your mouth. · Patches stick to your skin. They slowly release nicotine into your bloodstream.  · An inhaler has a dyer that contains nicotine. You breathe in a puff of nicotine vapor through your mouth and throat. · Nasal spray releases a mist that contains nicotine. What are key points about this decision?   · Using medicines can a lot to deal with, but keep at it. You will feel better. Follow-up care is a key part of your treatment and safety. Be sure to make and go to all appointments, and call your doctor if you are having problems. It's also a good idea to know your test results and keep a list of the medicines you take. How can you care for yourself at home? · Ask your family, friends, and coworkers for support. You have a better chance of quitting if you have help and support. · Join a support group, such as Nicotine Anonymous, for people who are trying to quit smoking. · Consider signing up for a smoking cessation program, such as the American Lung Association's Freedom from Smoking program.  · Get text messaging support. Go to the website at www.smokefree. gov to sign up for the [facility] program.  · Set a quit date. Pick your date carefully so that it is not right in the middle of a big deadline or stressful time. Once you quit, do not even take a puff. Get rid of all ashtrays and lighters after your last cigarette. Clean your house and your clothes so that they do not smell of smoke. · Learn how to be a nonsmoker. Think about ways you can avoid those things that make you reach for a cigarette. ¨ Avoid situations that put you at greatest risk for smoking. For some people, it is hard to have a drink with friends without smoking. For others, they might skip a coffee break with coworkers who smoke. ¨ Change your daily routine. Take a different route to work or eat a meal in a different place. · Cut down on stress. Calm yourself or release tension by doing an activity you enjoy, such as reading a book, taking a hot bath, or gardening. · Talk to your doctor or pharmacist about nicotine replacement therapy, which replaces the nicotine in your body. You still get nicotine but you do not use tobacco. Nicotine replacement products help you slowly reduce the amount of nicotine you need.  These products come in several forms, many of them available over-the-counter:  ¨ Nicotine patches  ¨ Nicotine gum and lozenges  ¨ Nicotine inhaler  · Ask your doctor about bupropion (Wellbutrin) or varenicline (Chantix), which are prescription medicines. They do not contain nicotine. They help you by reducing withdrawal symptoms, such as stress and anxiety. · Some people find hypnosis, acupuncture, and massage helpful for ending the smoking habit. · Eat a healthy diet and get regular exercise. Having healthy habits will help your body move past its craving for nicotine. · Be prepared to keep trying. Most people are not successful the first few times they try to quit. Do not get mad at yourself if you smoke again. Make a list of things you learned and think about when you want to try again, such as next week, next month, or next year. Where can you learn more? Go to https://[employer]ancisco[employer]Link.Jolicloud. org and sign in to your [facility]) account. Enter V977 in the [employer] box to learn more about \"Stopping Smoking: Care Instructions. \"     If you do not have an account, please click on the \"Sign Up Now\" link. Current as of: November 29, 2017  Content Version: 11.7  © 9921-0906 [employer]. Care instructions adapted under license by [facility] ([facility]). If you have questions about a medical condition or this instruction, always ask your healthcare professional. [address] any warranty or liability for your use of this information.

## 2018-08-20 RX ORDER — DOXYCYCLINE 100 MG/1
100 CAPSULE ORAL 2 TIMES DAILY
Qty: 180 CAPSULE | Refills: 1 | Status: CANCELLED | OUTPATIENT
Start: 2018-08-20

## 2018-10-02 ENCOUNTER — NURSE ONLY (OUTPATIENT)
Dept: FAMILY MEDICINE CLINIC | Age: 53
End: 2018-10-02
Payer: COMMERCIAL

## 2018-10-02 DIAGNOSIS — Z23 NEED FOR INFLUENZA VACCINATION: Primary | ICD-10-CM

## 2018-10-02 PROCEDURE — 90471 IMMUNIZATION ADMIN: CPT | Performed by: FAMILY MEDICINE

## 2018-10-02 PROCEDURE — 90688 IIV4 VACCINE SPLT 0.5 ML IM: CPT | Performed by: FAMILY MEDICINE

## 2018-10-31 ENCOUNTER — CARE COORDINATION (OUTPATIENT)
Dept: CARE COORDINATION | Age: 53
End: 2018-10-31

## 2018-11-05 DIAGNOSIS — E11.42 TYPE 2 DIABETES MELLITUS WITH PERIPHERAL NEUROPATHY (HCC): Chronic | ICD-10-CM

## 2018-11-05 DIAGNOSIS — E11.69 ERECTILE DYSFUNCTION ASSOCIATED WITH TYPE 2 DIABETES MELLITUS (HCC): ICD-10-CM

## 2018-11-05 DIAGNOSIS — Z87.11 HISTORY OF PEPTIC ULCER DISEASE: ICD-10-CM

## 2018-11-05 DIAGNOSIS — N52.1 ERECTILE DYSFUNCTION ASSOCIATED WITH TYPE 2 DIABETES MELLITUS (HCC): ICD-10-CM

## 2018-11-05 RX ORDER — SILDENAFIL CITRATE 20 MG/1
TABLET ORAL
Qty: 120 TABLET | Refills: 0 | Status: SHIPPED | OUTPATIENT
Start: 2018-11-05 | End: 2019-01-28 | Stop reason: SDUPTHER

## 2018-11-05 RX ORDER — OMEPRAZOLE 20 MG/1
CAPSULE, DELAYED RELEASE ORAL
Qty: 180 CAPSULE | Refills: 0 | Status: SHIPPED | OUTPATIENT
Start: 2018-11-05 | End: 2019-02-07 | Stop reason: DRUGHIGH

## 2018-11-05 RX ORDER — LISINOPRIL 10 MG/1
TABLET ORAL
Qty: 90 TABLET | Refills: 0 | Status: SHIPPED | OUTPATIENT
Start: 2018-11-05 | End: 2019-01-28 | Stop reason: SDUPTHER

## 2018-11-05 NOTE — TELEPHONE ENCOUNTER
From: Nayeli Garcia  Sent: 11/3/2018 8:42 AM EDT  Subject: Medication Renewal Request    Nayeli Garcia would like a refill of the following medications:     omeprazole (PRILOSEC) 20 MG delayed release capsule [Meng Kapoor DO]   Patient Comment: Filled my 2 week pill containers, just a few remaining.     sildenafil (REVATIO) 20 MG tablet [Meng Kapoor DO]     lisinopril (PRINIVIL;ZESTRIL) 10 MG tablet [Meng Kapoor DO]   Patient Comment: Filled my 2 week pill containers, just a few remaining.     linagliptin-metformin 2.5-1000 MG TABS [Meng Kapoor, ]   Patient Comment: If this is [place] Necessary like to reorder.     Preferred pharmacy: [address] [place] [address] [phone] - F [phone]

## 2018-11-09 ENCOUNTER — OFFICE VISIT (OUTPATIENT)
Dept: FAMILY MEDICINE CLINIC | Age: 53
End: 2018-11-09
Payer: COMMERCIAL

## 2018-11-09 VITALS
DIASTOLIC BLOOD PRESSURE: 84 MMHG | HEART RATE: 95 BPM | TEMPERATURE: 97.6 F | OXYGEN SATURATION: 91 % | RESPIRATION RATE: 14 BRPM | SYSTOLIC BLOOD PRESSURE: 138 MMHG | BODY MASS INDEX: 40.69 KG/M2 | WEIGHT: 244.5 LBS

## 2018-11-09 DIAGNOSIS — E11.42 TYPE 2 DIABETES MELLITUS WITH PERIPHERAL NEUROPATHY (HCC): Chronic | ICD-10-CM

## 2018-11-09 DIAGNOSIS — R80.9 TYPE 2 DIABETES MELLITUS WITH MICROALBUMINURIA, WITH LONG-TERM CURRENT USE OF INSULIN (HCC): Chronic | ICD-10-CM

## 2018-11-09 DIAGNOSIS — Z79.4 TYPE 2 DIABETES MELLITUS WITH MICROALBUMINURIA, WITH LONG-TERM CURRENT USE OF INSULIN (HCC): Chronic | ICD-10-CM

## 2018-11-09 DIAGNOSIS — E11.29 TYPE 2 DIABETES MELLITUS WITH MICROALBUMINURIA, WITH LONG-TERM CURRENT USE OF INSULIN (HCC): Chronic | ICD-10-CM

## 2018-11-09 DIAGNOSIS — Z79.4 ENCOUNTER FOR LONG-TERM (CURRENT) INSULIN USE (HCC): ICD-10-CM

## 2018-11-09 LAB
CREATININE URINE: 82.5 MG/DL
HBA1C MFR BLD: 7.7 %
MICROALBUMIN UR-MCNC: 9.2 MG/DL
MICROALBUMIN/CREAT UR-RTO: 111.5 MG/G (ref 0–30)

## 2018-11-09 PROCEDURE — 83036 HEMOGLOBIN GLYCOSYLATED A1C: CPT | Performed by: FAMILY MEDICINE

## 2018-11-09 PROCEDURE — 99213 OFFICE O/P EST LOW 20 MIN: CPT | Performed by: FAMILY MEDICINE

## 2018-11-09 RX ORDER — SILDENAFIL 100 MG/1
100 TABLET, FILM COATED ORAL DAILY PRN
Qty: 10 TABLET | Refills: 5 | Status: SHIPPED | OUTPATIENT
Start: 2018-11-09 | End: 2018-11-12 | Stop reason: SDUPTHER

## 2018-11-09 NOTE — PROGRESS NOTES
Subjective:      Patient ID: Maria L Medina is a 47 y.o.male. Chief Complaint   Patient presents with    Diabetes     Pt does not regularly check sugars at home, avoid carbs and sugars to the best that he can. takes medications as prescribed without any side effects       HPI    Her to follow up his diabetes does not check her sugars regularly home has been doing okay        No shortness breath chest nausea vomiting eating drinking well      He is doing also has weight loss really keep his calories down his last or 20 pounds a commended him on  No Known Allergies  Outpatient Encounter Prescriptions as of 11/9/2018   Medication Sig Dispense Refill    Insulin Degludec (TRESIBA FLEXTOUCH) 100 UNIT/ML SOPN 40 units each 3 pen 0    sildenafil (VIAGRA) 100 MG tablet Take 1 tablet by mouth daily as needed for Erectile Dysfunction 10 tablet 5    omeprazole (PRILOSEC) 20 MG delayed release capsule TAKE 1 CAPSULE BY MOUTH TWICE DAILY FOR STOMACH 180 capsule 0    sildenafil (REVATIO) 20 MG tablet TAKE 5 TABLETS AS NEEDED FOR ERECTILE DYSFUNCTION 120 tablet 0    lisinopril (PRINIVIL;ZESTRIL) 10 MG tablet TAKE 1 TABLET BY MOUTH ONE TIME DAILY 90 tablet 0    linagliptin-metformin 2.5-1000 MG TABS Take 1 tablet by mouth 2 times daily (replaces Tradjenta and Metformin) 180 tablet 0    empagliflozin (JARDIANCE) 10 MG tablet Take 1 tablet by mouth daily 90 tablet 1    pravastatin (PRAVACHOL) 40 MG tablet TAKE ONE TABLET BY MOUTH ONE TIME A DAY AT BEDTIME FOR CHOLESTEROL 90 tablet 1    aspirin EC 81 MG EC tablet Take 1 tablet by mouth daily 90 tablet 1    cetirizine (ZYRTEC) 10 MG tablet Take 10 mg by mouth daily      [DISCONTINUED] Insulin Degludec (TRESIBA FLEXTOUCH) 100 UNIT/ML SOPN 60 units each 3 pen 0    Blood Glucose Monitoring Suppl (AGAMATRIX AMP) MEÑO Checks 2-3 times daily. IDDM--ICD-10 E11.9 1 Device 0    blood glucose test strips (AGAMATRIX AMP TEST) strip Checks 2-3 times daily.  IDDM--ICD-10 E11.9 300

## 2018-11-12 RX ORDER — SILDENAFIL 100 MG/1
100 TABLET, FILM COATED ORAL DAILY PRN
Qty: 30 TABLET | Refills: 1 | Status: SHIPPED | OUTPATIENT
Start: 2018-11-12 | End: 2018-12-31 | Stop reason: SDUPTHER

## 2018-11-19 ENCOUNTER — TELEPHONE (OUTPATIENT)
Dept: PHARMACY | Facility: CLINIC | Age: 53
End: 2018-11-19

## 2018-12-24 ENCOUNTER — CARE COORDINATION (OUTPATIENT)
Dept: CARE COORDINATION | Age: 53
End: 2018-12-24

## 2018-12-24 ENCOUNTER — TELEPHONE (OUTPATIENT)
Dept: FAMILY MEDICINE CLINIC | Age: 53
End: 2018-12-24

## 2018-12-24 RX ORDER — SULFAMETHOXAZOLE AND TRIMETHOPRIM 800; 160 MG/1; MG/1
1 TABLET ORAL 2 TIMES DAILY
Qty: 14 TABLET | Refills: 0 | Status: SHIPPED | OUTPATIENT
Start: 2018-12-24 | End: 2018-12-31

## 2018-12-24 NOTE — CARE COORDINATION
Ambulatory Care Coordination Note  12/24/2018  CM Risk Score: 5  Leana Mortality Risk Score:      ACC: Levy Linn RN    Summary Note: Spoke with patient, reviewed 2019 employee diabetes management program benefits/requirements. States he is sure he met all requirements for 2018 , will review information for 2019 and make sure he is enrolled. He shares his A1c is down to \"7 something\". States no time for exercise due to job responsibilities but is managing through diet. He will review information and make sure he is enrolled for 2019 employee diabetes maangement program.         Care Coordination Interventions    Program Enrollment:  Rising Risk  Referral from Primary Care Provider:  Yes  Suggested Interventions and Community Resources  Diabetes Education: In Process (Comment: Is aware will need to meet with Diabeets Educater before end of year)  Medication Assistance Program:  Completed (Comment: [facility], has enrolled for 2nd half 2018)  Pharmacist:  Completed (Comment: is scheduled to have phone consultation with [employer] DM managemnt program pharmacist 8/16/2018)  Registered Dietician:  Completed (Comment: states per [employer]. DMP)  Zone Management Tools:  Completed (Comment: Diabetes)  Other Services or Interventions:  Provided with and reviewed [facility] Employee Diabetes Management Program information/benefits         Goals Addressed      COMPLETED: Conditions and Symptoms                  I will notify my provider of any barriers to my plan of care. I will follow my Zone Management tool to seek urgent or emergent care. I will notify my provider of any symptoms that indicate a worsening of my condition. Barriers: none and lack of education  Plan for overcoming my barriers: I will review diabetes educ handouts mailed to me and discuss any questions have with ACC. I will follow Diabetes zone management guide for managing my diabetes x.    Confidence: 8/10  Anticipated Goal Completion Date: 1 month         Medication Management                  I will take my medication as directed. I will notify my provider of any problems with medications, like adverse effects or side effects. I will notify my provider/Care Coordinator if I am unable to afford my medications. I will notify my provider for advice before I stop taking any of my medication. I will I will follow kenneth on all program requirements for [facility] employee diabates management program marium. Barriers: time constraints  Plan for overcoming my barriers: I will read program information so I am aware or annual program requirements for 2019. I will follow through and complete all program requirements for 2019  Confidence: 8/10  Anticipated Goal Completion Date: 12/1/2019            Prior to Admission medications    Medication Sig Start Date End Date Taking?  Authorizing Provider   Insulin Pen Needle (NOVOFINE PLUS) 32G X 4 MM MISC 1 each by Does not apply route daily 12/17/18  Yes Satya Crawford, DO   empagliflozin (JARDIANCE) 10 MG tablet Take 1 tablet by mouth daily 12/17/18  Yes Satya Crawford, DO   sildenafil (VIAGRA) 100 MG tablet Take 1 tablet by mouth daily as needed for Erectile Dysfunction 11/12/18  Yes Meng Kapoor, DO   Insulin Degludec (TRESIBA FLEXTOUCH) 100 UNIT/ML SOPN 40 units each 11/9/18  Yes Meng Kapoor DO   omeprazole (PRILOSEC) 20 MG delayed release capsule TAKE 1 CAPSULE BY MOUTH TWICE DAILY FOR STOMACH 11/5/18  Yes Meng Kapoor DO   sildenafil (REVATIO) 20 MG tablet TAKE 5 TABLETS AS NEEDED FOR ERECTILE DYSFUNCTION 11/5/18  Yes Meng Kapoor DO   lisinopril (PRINIVIL;ZESTRIL) 10 MG tablet TAKE 1 TABLET BY MOUTH ONE TIME DAILY 11/5/18  Yes Meng Kapoor, DO   linagliptin-metformin 2.5-1000 MG TABS Take 1 tablet by mouth 2 times daily (replaces Tradjenta and Metformin) 11/5/18  Yes Meng Kapoor DO   pravastatin (PRAVACHOL) 40 MG tablet TAKE ONE TABLET BY MOUTH ONE TIME A

## 2018-12-26 ENCOUNTER — CARE COORDINATION (OUTPATIENT)
Dept: CARE COORDINATION | Age: 53
End: 2018-12-26

## 2018-12-31 DIAGNOSIS — E11.42 TYPE 2 DIABETES MELLITUS WITH PERIPHERAL NEUROPATHY (HCC): Chronic | ICD-10-CM

## 2018-12-31 RX ORDER — PRAVASTATIN SODIUM 40 MG
TABLET ORAL
Qty: 90 TABLET | Refills: 0 | Status: SHIPPED | OUTPATIENT
Start: 2018-12-31 | End: 2019-03-30 | Stop reason: SDUPTHER

## 2018-12-31 RX ORDER — SILDENAFIL 100 MG/1
100 TABLET, FILM COATED ORAL DAILY PRN
Qty: 30 TABLET | Refills: 0 | Status: SHIPPED | OUTPATIENT
Start: 2018-12-31 | End: 2020-04-08 | Stop reason: ALTCHOICE

## 2019-01-09 ENCOUNTER — PATIENT MESSAGE (OUTPATIENT)
Dept: PHARMACY | Facility: CLINIC | Age: 54
End: 2019-01-09

## 2019-01-09 DIAGNOSIS — R80.9 TYPE 2 DIABETES MELLITUS WITH MICROALBUMINURIA, WITH LONG-TERM CURRENT USE OF INSULIN (HCC): Chronic | ICD-10-CM

## 2019-01-09 DIAGNOSIS — E11.42 TYPE 2 DIABETES MELLITUS WITH PERIPHERAL NEUROPATHY (HCC): Chronic | ICD-10-CM

## 2019-01-09 DIAGNOSIS — Z79.4 TYPE 2 DIABETES MELLITUS WITH MICROALBUMINURIA, WITH LONG-TERM CURRENT USE OF INSULIN (HCC): Chronic | ICD-10-CM

## 2019-01-09 DIAGNOSIS — E11.29 TYPE 2 DIABETES MELLITUS WITH MICROALBUMINURIA, WITH LONG-TERM CURRENT USE OF INSULIN (HCC): Chronic | ICD-10-CM

## 2019-01-09 DIAGNOSIS — Z79.4 ENCOUNTER FOR LONG-TERM (CURRENT) INSULIN USE (HCC): Primary | ICD-10-CM

## 2019-01-28 DIAGNOSIS — E11.69 ERECTILE DYSFUNCTION ASSOCIATED WITH TYPE 2 DIABETES MELLITUS (HCC): ICD-10-CM

## 2019-01-28 DIAGNOSIS — N52.1 ERECTILE DYSFUNCTION ASSOCIATED WITH TYPE 2 DIABETES MELLITUS (HCC): ICD-10-CM

## 2019-01-28 DIAGNOSIS — E11.42 TYPE 2 DIABETES MELLITUS WITH PERIPHERAL NEUROPATHY (HCC): Chronic | ICD-10-CM

## 2019-01-28 RX ORDER — SILDENAFIL CITRATE 20 MG/1
TABLET ORAL
Qty: 120 TABLET | Refills: 1 | Status: SHIPPED | OUTPATIENT
Start: 2019-01-28 | End: 2019-02-07 | Stop reason: SDUPTHER

## 2019-01-28 RX ORDER — LISINOPRIL 10 MG/1
TABLET ORAL
Qty: 90 TABLET | Refills: 1 | Status: SHIPPED | OUTPATIENT
Start: 2019-01-28 | End: 2019-03-30 | Stop reason: SDUPTHER

## 2019-02-07 ENCOUNTER — SCHEDULED TELEPHONE ENCOUNTER (OUTPATIENT)
Dept: PHARMACY | Facility: CLINIC | Age: 54
End: 2019-02-07

## 2019-02-07 ENCOUNTER — TELEPHONE (OUTPATIENT)
Dept: PHARMACY | Facility: CLINIC | Age: 54
End: 2019-02-07

## 2019-02-07 RX ORDER — BLOOD-GLUCOSE CONTROL, LOW
EACH MISCELLANEOUS
Qty: 100 EACH | Refills: 3 | Status: SHIPPED | OUTPATIENT
Start: 2019-02-07

## 2019-02-07 RX ORDER — BLOOD-GLUCOSE METER
EACH MISCELLANEOUS
Qty: 1 KIT | Refills: 0 | Status: SHIPPED | OUTPATIENT
Start: 2019-02-07

## 2019-02-07 RX ORDER — OMEPRAZOLE 20 MG/1
20 CAPSULE, DELAYED RELEASE ORAL DAILY
COMMUNITY
End: 2019-09-25

## 2019-02-11 ENCOUNTER — OFFICE VISIT (OUTPATIENT)
Dept: FAMILY MEDICINE CLINIC | Age: 54
End: 2019-02-11
Payer: COMMERCIAL

## 2019-02-11 VITALS
TEMPERATURE: 97.1 F | HEART RATE: 106 BPM | WEIGHT: 232.19 LBS | SYSTOLIC BLOOD PRESSURE: 122 MMHG | BODY MASS INDEX: 38.69 KG/M2 | RESPIRATION RATE: 12 BRPM | HEIGHT: 65 IN | DIASTOLIC BLOOD PRESSURE: 76 MMHG

## 2019-02-11 DIAGNOSIS — R20.2 LEFT LEG PARESTHESIAS: ICD-10-CM

## 2019-02-11 DIAGNOSIS — Z00.00 ANNUAL PHYSICAL EXAM: Primary | ICD-10-CM

## 2019-02-11 DIAGNOSIS — Z79.4 TYPE 2 DIABETES MELLITUS WITH MICROALBUMINURIA, WITH LONG-TERM CURRENT USE OF INSULIN (HCC): Chronic | ICD-10-CM

## 2019-02-11 DIAGNOSIS — Z12.5 SCREENING PSA (PROSTATE SPECIFIC ANTIGEN): ICD-10-CM

## 2019-02-11 DIAGNOSIS — Z12.11 SCREEN FOR COLON CANCER: ICD-10-CM

## 2019-02-11 DIAGNOSIS — R80.9 TYPE 2 DIABETES MELLITUS WITH MICROALBUMINURIA, WITH LONG-TERM CURRENT USE OF INSULIN (HCC): Chronic | ICD-10-CM

## 2019-02-11 DIAGNOSIS — E11.29 TYPE 2 DIABETES MELLITUS WITH MICROALBUMINURIA, WITH LONG-TERM CURRENT USE OF INSULIN (HCC): Chronic | ICD-10-CM

## 2019-02-11 DIAGNOSIS — E78.5 HYPERLIPIDEMIA, UNSPECIFIED HYPERLIPIDEMIA TYPE: Chronic | ICD-10-CM

## 2019-02-11 DIAGNOSIS — Z00.00 ANNUAL PHYSICAL EXAM: ICD-10-CM

## 2019-02-11 LAB
ALBUMIN SERPL-MCNC: 4.2 G/DL (ref 3.5–4.6)
ALP BLD-CCNC: 59 U/L (ref 35–104)
ALT SERPL-CCNC: 13 U/L (ref 0–41)
ANION GAP SERPL CALCULATED.3IONS-SCNC: 15 MEQ/L (ref 9–15)
AST SERPL-CCNC: 14 U/L (ref 0–40)
BASOPHILS ABSOLUTE: 0.1 K/UL (ref 0–0.2)
BASOPHILS RELATIVE PERCENT: 0.8 %
BILIRUB SERPL-MCNC: 0.4 MG/DL (ref 0.2–0.7)
BUN BLDV-MCNC: 12 MG/DL (ref 6–20)
CALCIUM SERPL-MCNC: 9.5 MG/DL (ref 8.5–9.9)
CHLORIDE BLD-SCNC: 97 MEQ/L (ref 95–107)
CHOLESTEROL, TOTAL: 138 MG/DL (ref 0–199)
CO2: 24 MEQ/L (ref 20–31)
CREAT SERPL-MCNC: 0.65 MG/DL (ref 0.7–1.2)
EOSINOPHILS ABSOLUTE: 0.1 K/UL (ref 0–0.7)
EOSINOPHILS RELATIVE PERCENT: 0.7 %
GFR AFRICAN AMERICAN: >60
GFR NON-AFRICAN AMERICAN: >60
GLOBULIN: 3.4 G/DL (ref 2.3–3.5)
GLUCOSE BLD-MCNC: 123 MG/DL (ref 70–99)
HBA1C MFR BLD: 6.6 % (ref 4.8–5.9)
HCT VFR BLD CALC: 53.4 % (ref 42–52)
HDLC SERPL-MCNC: 30 MG/DL (ref 40–59)
HEMOGLOBIN: 17.9 G/DL (ref 14–18)
LDL CHOLESTEROL CALCULATED: 74 MG/DL (ref 0–129)
LYMPHOCYTES ABSOLUTE: 2 K/UL (ref 1–4.8)
LYMPHOCYTES RELATIVE PERCENT: 16.6 %
MCH RBC QN AUTO: 30.4 PG (ref 27–31.3)
MCHC RBC AUTO-ENTMCNC: 33.5 % (ref 33–37)
MCV RBC AUTO: 90.5 FL (ref 80–100)
MONOCYTES ABSOLUTE: 1 K/UL (ref 0.2–0.8)
MONOCYTES RELATIVE PERCENT: 8.6 %
NEUTROPHILS ABSOLUTE: 8.7 K/UL (ref 1.4–6.5)
NEUTROPHILS RELATIVE PERCENT: 73.3 %
PDW BLD-RTO: 15.9 % (ref 11.5–14.5)
PLATELET # BLD: 188 K/UL (ref 130–400)
POTASSIUM SERPL-SCNC: 4.3 MEQ/L (ref 3.4–4.9)
PROSTATE SPECIFIC ANTIGEN: 0.87 NG/ML (ref 0–3.89)
RBC # BLD: 5.9 M/UL (ref 4.7–6.1)
SODIUM BLD-SCNC: 136 MEQ/L (ref 135–144)
TOTAL PROTEIN: 7.6 G/DL (ref 6.3–8)
TRIGL SERPL-MCNC: 171 MG/DL (ref 0–150)
TSH SERPL DL<=0.05 MIU/L-ACNC: 1.54 UIU/ML (ref 0.44–3.86)
VITAMIN B-12: 179 PG/ML (ref 232–1245)
WBC # BLD: 11.8 K/UL (ref 4.8–10.8)

## 2019-02-11 PROCEDURE — 99396 PREV VISIT EST AGE 40-64: CPT | Performed by: FAMILY MEDICINE

## 2019-02-11 ASSESSMENT — PATIENT HEALTH QUESTIONNAIRE - PHQ9
SUM OF ALL RESPONSES TO PHQ QUESTIONS 1-9: 0
2. FEELING DOWN, DEPRESSED OR HOPELESS: 0
SUM OF ALL RESPONSES TO PHQ9 QUESTIONS 1 & 2: 0
SUM OF ALL RESPONSES TO PHQ QUESTIONS 1-9: 0
1. LITTLE INTEREST OR PLEASURE IN DOING THINGS: 0

## 2019-03-19 ENCOUNTER — TELEPHONE (OUTPATIENT)
Dept: ADMINISTRATIVE | Age: 54
End: 2019-03-19

## 2019-03-19 ENCOUNTER — TELEPHONE (OUTPATIENT)
Dept: FAMILY MEDICINE CLINIC | Age: 54
End: 2019-03-19

## 2019-03-21 ENCOUNTER — CARE COORDINATION (OUTPATIENT)
Dept: CARE COORDINATION | Age: 54
End: 2019-03-21

## 2019-03-22 ENCOUNTER — TELEPHONE (OUTPATIENT)
Dept: FAMILY MEDICINE CLINIC | Age: 54
End: 2019-03-22

## 2019-03-23 ENCOUNTER — TELEPHONE (OUTPATIENT)
Dept: FAMILY MEDICINE CLINIC | Age: 54
End: 2019-03-23

## 2019-03-30 DIAGNOSIS — E11.42 TYPE 2 DIABETES MELLITUS WITH PERIPHERAL NEUROPATHY (HCC): Chronic | ICD-10-CM

## 2019-04-02 RX ORDER — PRAVASTATIN SODIUM 40 MG
TABLET ORAL
Qty: 90 TABLET | Refills: 0 | Status: SHIPPED | OUTPATIENT
Start: 2019-04-02 | End: 2019-06-24 | Stop reason: SDUPTHER

## 2019-04-02 RX ORDER — ASPIRIN 81 MG/1
81 TABLET ORAL DAILY
Qty: 90 TABLET | Refills: 1 | Status: SHIPPED | OUTPATIENT
Start: 2019-04-02 | End: 2019-10-24 | Stop reason: SDUPTHER

## 2019-04-02 RX ORDER — LISINOPRIL 10 MG/1
TABLET ORAL
Qty: 90 TABLET | Refills: 1 | Status: SHIPPED | OUTPATIENT
Start: 2019-04-02 | End: 2019-06-24 | Stop reason: SDUPTHER

## 2019-04-03 ENCOUNTER — PATIENT MESSAGE (OUTPATIENT)
Dept: PHARMACY | Facility: CLINIC | Age: 54
End: 2019-04-03

## 2019-06-21 ENCOUNTER — CARE COORDINATION (OUTPATIENT)
Dept: CARE COORDINATION | Age: 54
End: 2019-06-21

## 2019-06-21 NOTE — CARE COORDINATION
ACC SPOKE TO [facility] EMPLOYEE WHO NEEDED TO REESTABLISH WITH [facility] PCP. [facility] REVIEWED PROVIDERS IN [facility] OF [facility] OFFICE. PATIENT WAS SET UP WITH DR. Racquel Cohen FOR NEW TO PROVIDER VISIT. 6/24/2019  PATIENT IS IN NEED OF MEDICATION REFILLS SENT TO [facility] MAIL IN. HE WILL ADDRESS AT APPOINTMENT  PATIENT DECLINED ANY ADDITIONAL CC NEEDS.  PATIENT WAS PROVIDED [facility] CONTACT INFORMATION

## 2019-06-24 ENCOUNTER — OFFICE VISIT (OUTPATIENT)
Dept: FAMILY MEDICINE CLINIC | Age: 54
End: 2019-06-24
Payer: COMMERCIAL

## 2019-06-24 VITALS
BODY MASS INDEX: 40.15 KG/M2 | HEART RATE: 86 BPM | WEIGHT: 241 LBS | DIASTOLIC BLOOD PRESSURE: 68 MMHG | SYSTOLIC BLOOD PRESSURE: 130 MMHG | TEMPERATURE: 98.7 F | HEIGHT: 65 IN | RESPIRATION RATE: 12 BRPM | OXYGEN SATURATION: 97 %

## 2019-06-24 DIAGNOSIS — Z00.00 HEALTH CARE MAINTENANCE: ICD-10-CM

## 2019-06-24 DIAGNOSIS — K12.2 CELLULITIS OF ORAL SOFT TISSUES: Primary | ICD-10-CM

## 2019-06-24 DIAGNOSIS — E11.42 TYPE 2 DIABETES MELLITUS WITH PERIPHERAL NEUROPATHY (HCC): Chronic | ICD-10-CM

## 2019-06-24 PROCEDURE — 99213 OFFICE O/P EST LOW 20 MIN: CPT | Performed by: INTERNAL MEDICINE

## 2019-06-24 RX ORDER — PRAVASTATIN SODIUM 40 MG
TABLET ORAL
Qty: 90 TABLET | Refills: 3 | Status: SHIPPED | OUTPATIENT
Start: 2019-06-24 | End: 2019-08-06 | Stop reason: SDUPTHER

## 2019-06-24 RX ORDER — LISINOPRIL 10 MG/1
TABLET ORAL
Qty: 90 TABLET | Refills: 3 | Status: SHIPPED | OUTPATIENT
Start: 2019-06-24 | End: 2019-08-06 | Stop reason: SDUPTHER

## 2019-06-24 RX ORDER — MAGNESIUM 30 MG
30 TABLET ORAL 2 TIMES DAILY
COMMUNITY
End: 2019-09-25

## 2019-06-24 RX ORDER — BLOOD-GLUCOSE CONTROL, LOW
EACH MISCELLANEOUS
Qty: 100 EACH | Refills: 3 | Status: CANCELLED | OUTPATIENT
Start: 2019-06-24

## 2019-06-24 RX ORDER — MULTIVIT-MIN/FOLIC/VIT K/LYCOP 400-300MCG
1 TABLET ORAL EVERY OTHER DAY
COMMUNITY

## 2019-06-24 RX ORDER — AMOXICILLIN AND CLAVULANATE POTASSIUM 875; 125 MG/1; MG/1
1 TABLET, FILM COATED ORAL 2 TIMES DAILY
Qty: 14 TABLET | Refills: 0 | Status: SHIPPED | OUTPATIENT
Start: 2019-06-24 | End: 2019-07-01

## 2019-06-24 NOTE — PROGRESS NOTES
Subjective:      Patient ID: Leticia Espinoza is a 47 y.o. male    New to provider    HPI  Pt presents to establish care with new PCP. Formerly with Dr. Bluford Gottron. Hx T2DM and hyperTG. Placed on tresiba, linagliptin/metformin and Jardiance  Last A1c was 6. 6.in 2/2019. Will repeat today and possibly Dc insulin. Urine microalb:creat ratio: 111 in 11/2018. Declines colonoscopy. CC: Right jaw infection x 4 days  Right jaw started to swell and hurt 4 days ago. This is the sight of dental implant placed a few years ago. NO discharge or difficulty swallowing. Same thing happened immediately after the implant was placed back then, requiring placement on an antibiotic. History reviewed. No pertinent past medical history. History reviewed. No pertinent surgical history.   Social History     Socioeconomic History    Marital status: Unknown     Spouse name: Not on file    Number of children: Not on file    Years of education: Not on file    Highest education level: Not on file   Occupational History    Not on file   Social Needs    Financial resource strain: Not on file    Food insecurity:     Worry: Not on file     Inability: Not on file    Transportation needs:     Medical: Not on file     Non-medical: Not on file   Tobacco Use    Smoking status: Current Every Day Smoker     Packs/day: 1.00    Smokeless tobacco: Never Used   Substance and Sexual Activity    Alcohol use: No     Alcohol/week: 0.0 oz    Drug use: No    Sexual activity: Not on file   Lifestyle    Physical activity:     Days per week: Not on file     Minutes per session: Not on file    Stress: Not on file   Relationships    Social connections:     Talks on phone: Not on file     Gets together: Not on file     Attends [employer] service: Not on file     Active member of club or organization: Not on file     Attends meetings of clubs or organizations: Not on file     Relationship status: Not on file    Intimate partner violence:     Fear of current or ex partner: Not on file     Emotionally abused: Not on file     Physically abused: Not on file     Forced sexual activity: Not on file   Other Topics Concern    Not on file   Social History Narrative    Not on file     History reviewed. No pertinent family history. Allergies:  Patient has no known allergies.   Patient Active Problem List   Diagnosis    History of peptic ulcer disease    Periodic limb movement disorder    Obstructive sleep apnea    Type 2 diabetes mellitus with peripheral neuropathy [facility]    Tear of right retina without detachment    Type 2 diabetes mellitus with microalbuminuria ([facility]    Encounter for long-term (current) insulin use ([facility]    Hyperlipidemia    Erectile dysfunction associated with type 2 diabetes mellitus ([facility]    Left leg paresthesias     Current Outpatient Medications on File Prior to Visit   Medication Sig Dispense Refill    Cyanocobalamin (VITAMIN B 12 PO) Take 1,000 mg by mouth daily      Multiple Vitamins-Minerals (ONE DAILY MULTIVITAMIN ADULT) TABS Take by mouth daily      magnesium 30 MG tablet Take 30 mg by mouth 2 times daily      aspirin EC 81 MG EC tablet Take 1 tablet by mouth daily 90 tablet 1    Insulin Degludec (TRESIBA FLEXTOUCH) 100 UNIT/ML SOPN 40 units each 3 pen 1    omeprazole (PRILOSEC) 20 MG delayed release capsule Take 20 mg by mouth daily      Blood Glucose Monitoring Suppl (PRODIGY AUTOCODE BLOOD GLUCOSE) w/Device KIT Use to test once daily and as needed as directed by provider 1 kit 0    PRODIGY LANCETS 28G MISC Use to test once daily and as needed as directed by provider 100 each 3    blood glucose test strips (PRODIGY NO CODING BLOOD GLUC) strip Use to test once daily and as needed as directed by provider 100 each 3    VITAMIN E PO Take 1 tablet by mouth every other day       cetirizine (ZYRTEC) 10 MG tablet Take 10 mg by mouth daily as needed       sildenafil (VIAGRA) 100 MG tablet Take 1 tablet by mouth daily as needed for Erectile Dysfunction 30 tablet 0     No current facility-administered medications on file prior to visit. Review of Systems   Constitutional: Negative for chills, diaphoresis, fatigue and fever. HENT: Negative for congestion, ear discharge, ear pain, rhinorrhea, sinus pressure, sinus pain, sneezing and sore throat. Respiratory: Negative for cough, shortness of breath and wheezing. Cardiovascular: Negative for chest pain. Gastrointestinal: Negative for abdominal pain, diarrhea, nausea and vomiting. Endocrine: Negative for cold intolerance and heat intolerance. Genitourinary: Negative for dysuria and frequency. Neurological: Negative for dizziness and light-headedness. Objective:   /68   Pulse 86   Temp 98.7 °F (37.1 °C) (Temporal)   Resp 12   Ht 5' 5\" (1.651 m)   Wt 241 lb (109.3 kg)   SpO2 97%   BMI 40.10 kg/m²     Physical Exam   Constitutional: He is oriented to person, place, and time. He appears well-developed and well-nourished. No distress. HENT:   Head: Normocephalic and atraumatic. Right Ear: External ear normal.   Left Ear: External ear normal.   Facial assymetry: right jaw swelling, firm TTP with marked erythema and swelling around the right lower canine also with TTP, no discharge. Cardiovascular: Normal rate, regular rhythm and normal heart sounds. Pulmonary/Chest: Effort normal and breath sounds normal.   Abdominal: Soft. Normal appearance and bowel sounds are normal. There is no hepatosplenomegaly. There is no tenderness. Neurological: He is alert and oriented to person, place, and time. Assessment:       Diagnosis Orders   1. Cellulitis of oral soft tissues  amoxicillin-clavulanate (AUGMENTIN) 875-125 MG per tablet   2. Type 2 diabetes mellitus with peripheral neuropathy (HCC)  lisinopril (PRINIVIL;ZESTRIL) 10 MG tablet    pravastatin (PRAVACHOL) 40 MG tablet    Hemoglobin A1C    Lipid Panel   3.  Health care maintenance  HIV-1,2 Combo Ag/Ab By AMY Reflexive Panel     Plan:      Orders Placed This Encounter   Procedures    Hemoglobin A1C     Standing Status:   Future     Standing Expiration Date:   2020    Lipid Panel     Standing Status:   Future     Standing Expiration Date:   2020     Order Specific Question:   Is Patient Fasting?/# of Hours     Answer:   yes    HIV-1,2 Combo Ag/Ab By AMY Reflexive Panel     Standing Status:   Future     Standing Expiration Date:   2020     Orders Placed This Encounter   Medications    linagliptin-metformin 2.5-1000 MG TABS     Sig: Take 1 tablet by mouth 2 times daily (replaces Tradjenta and Metformin)     Dispense:  180 tablet     Refill:  1    empagliflozin (JARDIANCE) 10 MG tablet     Sig: Take 1 tablet by mouth daily     Dispense:  90 tablet     Refill:  3    Insulin Pen Needle (NOVOFINE PLUS) 32G X 4 MM MISC     Si each by Does not apply route daily     Dispense:  200 each     Refill:  1    lisinopril (PRINIVIL;ZESTRIL) 10 MG tablet     Sig: TAKE 1 TABLET BY MOUTH ONE TIME DAILY     Dispense:  90 tablet     Refill:  3    pravastatin (PRAVACHOL) 40 MG tablet     Sig: TAKE ONE TABLET BY MOUTH ONE TIME A DAY AT BEDTIME FOR CHOLESTEROL     Dispense:  90 tablet     Refill:  3    amoxicillin-clavulanate (AUGMENTIN) 875-125 MG per tablet     Sig: Take 1 tablet by mouth 2 times daily for 7 days     Dispense:  14 tablet     Refill:  0     Return in about 3 months (around 2019) for assessment of response to treatment, review of test results.

## 2019-06-25 ASSESSMENT — ENCOUNTER SYMPTOMS
SINUS PAIN: 0
SHORTNESS OF BREATH: 0
ABDOMINAL PAIN: 0
VOMITING: 0
COUGH: 0
RHINORRHEA: 0
NAUSEA: 0
WHEEZING: 0
SINUS PRESSURE: 0
DIARRHEA: 0
SORE THROAT: 0

## 2019-08-06 DIAGNOSIS — E11.42 TYPE 2 DIABETES MELLITUS WITH PERIPHERAL NEUROPATHY (HCC): Chronic | ICD-10-CM

## 2019-08-06 RX ORDER — PRAVASTATIN SODIUM 40 MG
TABLET ORAL
Qty: 90 TABLET | Refills: 3 | Status: SHIPPED | OUTPATIENT
Start: 2019-08-06 | End: 2020-01-16 | Stop reason: SDUPTHER

## 2019-08-06 RX ORDER — LISINOPRIL 10 MG/1
TABLET ORAL
Qty: 90 TABLET | Refills: 3 | Status: SHIPPED | OUTPATIENT
Start: 2019-08-06 | End: 2020-03-16 | Stop reason: SDUPTHER

## 2019-09-25 ENCOUNTER — OFFICE VISIT (OUTPATIENT)
Dept: FAMILY MEDICINE CLINIC | Age: 54
End: 2019-09-25
Payer: COMMERCIAL

## 2019-09-25 VITALS
DIASTOLIC BLOOD PRESSURE: 84 MMHG | SYSTOLIC BLOOD PRESSURE: 128 MMHG | OXYGEN SATURATION: 98 % | WEIGHT: 244.6 LBS | BODY MASS INDEX: 40.75 KG/M2 | HEIGHT: 65 IN | TEMPERATURE: 97.4 F | HEART RATE: 93 BPM

## 2019-09-25 DIAGNOSIS — E11.29 TYPE 2 DIABETES MELLITUS WITH MICROALBUMINURIA, WITH LONG-TERM CURRENT USE OF INSULIN (HCC): Chronic | ICD-10-CM

## 2019-09-25 DIAGNOSIS — K21.9 GASTROESOPHAGEAL REFLUX DISEASE, ESOPHAGITIS PRESENCE NOT SPECIFIED: ICD-10-CM

## 2019-09-25 DIAGNOSIS — Z23 NEED FOR INFLUENZA VACCINATION: Primary | ICD-10-CM

## 2019-09-25 DIAGNOSIS — Z79.4 TYPE 2 DIABETES MELLITUS WITH MICROALBUMINURIA, WITH LONG-TERM CURRENT USE OF INSULIN (HCC): Chronic | ICD-10-CM

## 2019-09-25 DIAGNOSIS — R80.9 TYPE 2 DIABETES MELLITUS WITH MICROALBUMINURIA, WITH LONG-TERM CURRENT USE OF INSULIN (HCC): Chronic | ICD-10-CM

## 2019-09-25 LAB
ALBUMIN SERPL-MCNC: 4.2 G/DL (ref 3.5–4.6)
ALP BLD-CCNC: 70 U/L (ref 35–104)
ALT SERPL-CCNC: 19 U/L (ref 0–41)
ANION GAP SERPL CALCULATED.3IONS-SCNC: 14 MEQ/L (ref 9–15)
AST SERPL-CCNC: 12 U/L (ref 0–40)
BILIRUB SERPL-MCNC: <0.2 MG/DL (ref 0.2–0.7)
BUN BLDV-MCNC: 13 MG/DL (ref 6–20)
CALCIUM SERPL-MCNC: 9.3 MG/DL (ref 8.5–9.9)
CHLORIDE BLD-SCNC: 104 MEQ/L (ref 95–107)
CO2: 21 MEQ/L (ref 20–31)
CREAT SERPL-MCNC: 0.59 MG/DL (ref 0.7–1.2)
GFR AFRICAN AMERICAN: >60
GFR NON-AFRICAN AMERICAN: >60
GLOBULIN: 3.2 G/DL (ref 2.3–3.5)
GLUCOSE BLD-MCNC: 136 MG/DL (ref 70–99)
HBA1C MFR BLD: 9.1 % (ref 4.8–5.9)
POTASSIUM SERPL-SCNC: 4 MEQ/L (ref 3.4–4.9)
SODIUM BLD-SCNC: 139 MEQ/L (ref 135–144)
TOTAL PROTEIN: 7.4 G/DL (ref 6.3–8)

## 2019-09-25 PROCEDURE — 99203 OFFICE O/P NEW LOW 30 MIN: CPT | Performed by: NURSE PRACTITIONER

## 2019-09-25 PROCEDURE — 90688 IIV4 VACCINE SPLT 0.5 ML IM: CPT | Performed by: NURSE PRACTITIONER

## 2019-09-25 PROCEDURE — 90471 IMMUNIZATION ADMIN: CPT | Performed by: NURSE PRACTITIONER

## 2019-09-25 RX ORDER — OMEPRAZOLE 20 MG/1
20 CAPSULE, DELAYED RELEASE ORAL
Qty: 90 CAPSULE | Refills: 1 | Status: SHIPPED | OUTPATIENT
Start: 2019-09-25 | End: 2020-03-13 | Stop reason: SDUPTHER

## 2019-09-25 ASSESSMENT — ENCOUNTER SYMPTOMS: SHORTNESS OF BREATH: 0

## 2019-09-25 NOTE — PROGRESS NOTES
Vaccine Information Sheet, \"Influenza - Inactivated\"  given to Caroline Lopez, or parent/legal guardian of  Caroline Lopez and verbalized understanding. Patient responses:    Have you ever had a reaction to a flu vaccine? No  Are you able to eat eggs without adverse effects? Yes  Do you have any current illness? No  Have you ever had [place] Syndrome? No    Flu vaccine given per order. Please see immunization tab.
mood and affect. His behavior is normal. Judgment and thought content normal.   Nursing note and vitals reviewed. Assessment & Plan     Diagnosis Orders   1. Type 2 diabetes mellitus with microalbuminuria, with long-term current use of insulin ([facility])  Hemoglobin A1C    Comprehensive Metabolic Panel    omeprazole (PRILOSEC) 20 MG delayed release capsule    Insulin Degludec (TRESIBA FLEXTOUCH) 100 UNIT/ML SOPN   2. Gastroesophageal reflux disease, esophagitis presence not specified         Orders Placed This Encounter   Procedures    Hemoglobin A1C     Standing Status:   Future     Standing Expiration Date:   2020    Comprehensive Metabolic Panel     Standing Status:   Future     Standing Expiration Date:   2020       Orders Placed This Encounter   Medications    omeprazole (PRILOSEC) 20 MG delayed release capsule     Sig: Take 1 capsule by mouth every morning (before breakfast)     Dispense:  90 capsule     Refill:  1    Insulin Degludec (TRESIBA FLEXTOUCH) 100 UNIT/ML SOPN     Si units each     Dispense:  3 pen     Refill:  1     Discussed with patient that as he restarts insulin he should start at 10 units daily and monitor his sugars as he increases to prevent low blood sugars. Side effects, adverse effects of the medication prescribed today, as well as treatment plan/ rationale and result expectations have been discussed with the patient who expresses understanding and desires to proceed. Close follow up to evaluate treatment results and for coordination of care. I have reviewed the patient's medical history in detail and updated the computerized patient record. As always, patient is advised that if symptoms worsen in any way they will proceed to the nearest emergency room. Follow up in 3 months.      Navneet Villagran, APRN - CNP

## 2019-10-10 ENCOUNTER — PATIENT MESSAGE (OUTPATIENT)
Dept: PHARMACY | Facility: CLINIC | Age: 54
End: 2019-10-10

## 2019-10-15 DIAGNOSIS — E11.29 TYPE 2 DIABETES MELLITUS WITH MICROALBUMINURIA, WITH LONG-TERM CURRENT USE OF INSULIN (HCC): Primary | Chronic | ICD-10-CM

## 2019-10-15 DIAGNOSIS — R80.9 TYPE 2 DIABETES MELLITUS WITH MICROALBUMINURIA, WITH LONG-TERM CURRENT USE OF INSULIN (HCC): Primary | Chronic | ICD-10-CM

## 2019-10-15 DIAGNOSIS — Z79.4 TYPE 2 DIABETES MELLITUS WITH MICROALBUMINURIA, WITH LONG-TERM CURRENT USE OF INSULIN (HCC): Primary | Chronic | ICD-10-CM

## 2019-10-24 RX ORDER — ASPIRIN 81 MG/1
81 TABLET ORAL DAILY
Qty: 90 TABLET | Refills: 1 | Status: SHIPPED | OUTPATIENT
Start: 2019-10-24 | End: 2019-12-18 | Stop reason: SDUPTHER

## 2019-11-26 ENCOUNTER — TELEPHONE (OUTPATIENT)
Dept: PHARMACY | Facility: CLINIC | Age: 54
End: 2019-11-26

## 2019-12-18 RX ORDER — ASPIRIN 81 MG/1
81 TABLET ORAL DAILY
Qty: 90 TABLET | Refills: 1 | Status: SHIPPED | OUTPATIENT
Start: 2019-12-18 | End: 2020-03-13 | Stop reason: SDUPTHER

## 2019-12-19 ENCOUNTER — CARE COORDINATION (OUTPATIENT)
Dept: CARE COORDINATION | Age: 54
End: 2019-12-19

## 2019-12-27 DIAGNOSIS — E11.29 TYPE 2 DIABETES MELLITUS WITH MICROALBUMINURIA, WITH LONG-TERM CURRENT USE OF INSULIN (HCC): Chronic | ICD-10-CM

## 2019-12-27 DIAGNOSIS — Z79.4 TYPE 2 DIABETES MELLITUS WITH MICROALBUMINURIA, WITH LONG-TERM CURRENT USE OF INSULIN (HCC): Chronic | ICD-10-CM

## 2019-12-27 DIAGNOSIS — R80.9 TYPE 2 DIABETES MELLITUS WITH MICROALBUMINURIA, WITH LONG-TERM CURRENT USE OF INSULIN (HCC): Chronic | ICD-10-CM

## 2019-12-27 LAB
CREATININE URINE: 17.8 MG/DL
MICROALBUMIN UR-MCNC: 1.9 MG/DL
MICROALBUMIN/CREAT UR-RTO: 106.7 MG/G (ref 0–30)

## 2019-12-30 ENCOUNTER — CARE COORDINATION (OUTPATIENT)
Dept: CARE COORDINATION | Age: 54
End: 2019-12-30

## 2020-01-02 NOTE — TELEPHONE ENCOUNTER
Patient requesting medication refill.  Please approve or deny this request.    Rx requested:  Requested Prescriptions     Pending Prescriptions Disp Refills    linagliptin-metformin 2.5-1000 MG TABS 180 tablet 1     Sig: Take 1 tablet by mouth 2 times daily (replaces Tradjenta and Metformin)         Last Office Visit:   9/25/2019      Next Visit Date:  Future Appointments   Date Time Provider Monster Sultana   1/7/2020  [facility], APRN - CNP [facility] [place]

## 2020-01-07 ENCOUNTER — OFFICE VISIT (OUTPATIENT)
Dept: FAMILY MEDICINE CLINIC | Age: 55
End: 2020-01-07
Payer: COMMERCIAL

## 2020-01-07 VITALS
TEMPERATURE: 98.2 F | SYSTOLIC BLOOD PRESSURE: 118 MMHG | DIASTOLIC BLOOD PRESSURE: 84 MMHG | HEART RATE: 87 BPM | OXYGEN SATURATION: 98 % | BODY MASS INDEX: 42.68 KG/M2 | WEIGHT: 250 LBS | HEIGHT: 64 IN

## 2020-01-07 LAB — HBA1C MFR BLD: 11.4 %

## 2020-01-07 PROCEDURE — 99214 OFFICE O/P EST MOD 30 MIN: CPT | Performed by: NURSE PRACTITIONER

## 2020-01-07 PROCEDURE — 83036 HEMOGLOBIN GLYCOSYLATED A1C: CPT | Performed by: NURSE PRACTITIONER

## 2020-01-07 RX ORDER — ESCITALOPRAM OXALATE 10 MG/1
10 TABLET ORAL DAILY
Qty: 90 TABLET | Refills: 1 | Status: SHIPPED | OUTPATIENT
Start: 2020-01-07 | End: 2020-03-13 | Stop reason: SDUPTHER

## 2020-01-07 ASSESSMENT — PATIENT HEALTH QUESTIONNAIRE - PHQ9
SUM OF ALL RESPONSES TO PHQ QUESTIONS 1-9: 2
SUM OF ALL RESPONSES TO PHQ9 QUESTIONS 1 & 2: 2
1. LITTLE INTEREST OR PLEASURE IN DOING THINGS: 1
SUM OF ALL RESPONSES TO PHQ QUESTIONS 1-9: 2
2. FEELING DOWN, DEPRESSED OR HOPELESS: 1

## 2020-01-07 ASSESSMENT — ENCOUNTER SYMPTOMS
COUGH: 0
SHORTNESS OF BREATH: 0
DIARRHEA: 0
CONSTIPATION: 0

## 2020-01-07 NOTE — PROGRESS NOTES
Subjective  Chief Complaint   Patient presents with    3 Month Follow-Up     lov 9/25/19     Diabetes    Hyperlipidemia    Health Maintenance     has cologuard kit at home        Diabetes   He presents for his follow-up diabetic visit. He has type 2 diabetes mellitus. His disease course has been stable. There are no hypoglycemic associated symptoms. There are no diabetic associated symptoms. Pertinent negatives for diabetes include no chest pain and no fatigue. There are no hypoglycemic complications. Symptoms are stable. Diabetic complications include nephropathy. Risk factors for coronary artery disease include male sex, obesity and dyslipidemia. Current diabetic treatment includes intensive insulin program and oral agent (triple therapy). He is compliant with treatment all of the time. There is no change in his home blood glucose trend. An ACE inhibitor/angiotensin II receptor blocker is being taken. Pt notes that he gets slightly \"blue\" during the winter. Asking if he can be put on medication for the time being. Had been on prozac previously. Lab Results   Component Value Date    LABA1C 9.1 (H) 09/25/2019     No results found for: EAG      Patient Active Problem List    Diagnosis Date Noted    Type 2 diabetes mellitus with microalbuminuria ([address] 09/26/2014     Priority: Low    Encounter for long-term (current) insulin use ([address] 09/26/2014     Priority: Low    Hyperlipidemia 09/26/2014     Priority: Low    Left leg paresthesias 02/11/2019    Erectile dysfunction associated with type 2 diabetes mellitus ([address] 02/09/2018    Tear of right retina without detachment 02/14/2014    Type 2 diabetes mellitus with peripheral neuropathy ([address] 04/10/2013    Periodic limb movement disorder 03/08/2009    Obstructive sleep apnea 03/08/2009    History of peptic ulcer disease 06/01/1995     Past Medical History:   Diagnosis Date    Diabetes mellitus ([address]      No past surgical history on file.   Family History   Problem Relation Age of Onset    High Blood Pressure Mother     Diabetes Father     Cancer Paternal Uncle     Diabetes Paternal Grandmother     Breast Cancer Neg Hx     Colon Cancer Neg Hx     Heart Attack Neg Hx     High Cholesterol Neg Hx     Stroke Neg Hx      Social History     Socioeconomic History    Marital status: Unknown     Spouse name: None    Number of children: None    Years of education: None    Highest education level: None   Occupational History    None   Social Needs    Financial resource strain: None    Food insecurity:     Worry: None     Inability: None    Transportation needs:     Medical: None     Non-medical: None   Tobacco Use    Smoking status: Current Every Day Smoker     Packs/day: 1.00     Years: 30.00     Pack years: 30.00     Types: Cigarettes     Start date: 9/25/1989    Smokeless tobacco: Never Used   Substance and Sexual Activity    Alcohol use: No     Alcohol/week: 0.0 standard drinks    Drug use: No    Sexual activity: None   Lifestyle    Physical activity:     Days per week: None     Minutes per session: None    Stress: None   Relationships    Social connections:     Talks on phone: None     Gets together: None     Attends Spiritism service: None     Active member of club or organization: None     Attends meetings of clubs or organizations: None     Relationship status: None    Intimate partner violence:     Fear of current or ex partner: None     Emotionally abused: None     Physically abused: None     Forced sexual activity: None   Other Topics Concern    None   Social History Narrative    None     Current Outpatient Medications on File Prior to Visit   Medication Sig Dispense Refill    linagliptin-metformin 2.5-1000 MG TABS Take 1 tablet by mouth 2 times daily (replaces Tradjenta and Metformin) 180 tablet 1    aspirin EC 81 MG EC tablet Take 1 tablet by mouth daily 90 tablet 1    Insulin Pen Needle (NOVOFINE PLUS) 32G X 4 MM MISC 1 each by Does not apply route daily 200 each 1    BEE POLLEN PO Take by mouth 3 times daily      omeprazole (PRILOSEC) 20 MG delayed release capsule Take 1 capsule by mouth every morning (before breakfast) 90 capsule 1    Insulin Degludec (TRESIBA FLEXTOUCH) 100 UNIT/ML SOPN 40 units each 3 pen 1    lisinopril (PRINIVIL;ZESTRIL) 10 MG tablet TAKE 1 TABLET BY MOUTH ONE TIME DAILY 90 tablet 3    pravastatin (PRAVACHOL) 40 MG tablet TAKE ONE TABLET BY MOUTH ONE TIME A DAY AT BEDTIME FOR CHOLESTEROL 90 tablet 3    Cyanocobalamin (VITAMIN B 12 PO) Take 1,000 mg by mouth daily      Multiple Vitamins-Minerals (ONE DAILY MULTIVITAMIN ADULT) TABS Take by mouth daily      Blood Glucose Monitoring Suppl (PRODIGY AUTOCODE BLOOD GLUCOSE) w/Device KIT Use to test once daily and as needed as directed by provider 1 kit 0    PRODIGY LANCETS 28G MISC Use to test once daily and as needed as directed by provider 100 each 3    blood glucose test strips (PRODIGY NO CODING BLOOD GLUC) strip Use to test once daily and as needed as directed by provider 100 each 3    sildenafil (VIAGRA) 100 MG tablet Take 1 tablet by mouth daily as needed for Erectile Dysfunction 30 tablet 0    VITAMIN E PO Take 1 tablet by mouth every other day        No current facility-administered medications on file prior to visit. No Known Allergies    Review of Systems   Constitutional: Negative for fatigue. Respiratory: Negative for cough and shortness of breath. Cardiovascular: Negative for chest pain. Gastrointestinal: Negative for constipation and diarrhea. Objective  Vitals:    01/07/20 0838   BP: 118/84   Pulse: 87   Temp: 98.2 °F (36.8 °C)   SpO2: 98%   Weight: 250 lb (113.4 kg)   Height: 5' 4\" (1.626 m)     Physical Exam  Vitals signs and nursing note reviewed. Constitutional:       Appearance: Normal appearance. He is normal weight. HENT:      Head: Normocephalic.       Right Ear: Tympanic membrane normal.      Left Ear: Tympanic membrane normal.      Mouth/Throat:      Mouth: Mucous membranes are moist.   Eyes:      Extraocular Movements: Extraocular movements intact. Conjunctiva/sclera: Conjunctivae normal.      Pupils: Pupils are equal, round, and reactive to light. Cardiovascular:      Rate and Rhythm: Normal rate and regular rhythm. Pulses: Normal pulses. Heart sounds: Normal heart sounds. Pulmonary:      Effort: Pulmonary effort is normal.      Breath sounds: Normal breath sounds. Skin:     General: Skin is warm. Neurological:      General: No focal deficit present. Mental Status: He is alert and oriented to person, place, and time. Mental status is at baseline. Psychiatric:         Mood and Affect: Mood normal.         Behavior: Behavior normal.         Thought Content: Thought content normal.         Judgment: Judgment normal.       Assessment & Plan     Diagnosis Orders   1. Type 2 diabetes mellitus with microalbuminuria, with long-term current use of insulin ([facility])  empagliflozin (JARDIANCE) 25 MG tablet    POCT glycosylated hemoglobin (Hb A1C)   2. Depression, unspecified depression type  escitalopram (LEXAPRO) 10 MG tablet       Orders Placed This Encounter   Procedures    POCT glycosylated hemoglobin (Hb A1C)       Orders Placed This Encounter   Medications    empagliflozin (JARDIANCE) 25 MG tablet     Sig: Take 25 mg by mouth daily     Dispense:  90 tablet     Refill:  1    escitalopram (LEXAPRO) 10 MG tablet     Sig: Take 1 tablet by mouth daily     Dispense:  90 tablet     Refill:  1     Pt is going to titrate the tresiba up. We discussed how to do this. We are also increasing the jardiance dose. Fu in 3 mos. Today's A1c was 11%. Side effects, adverse effects of the medication prescribed today, as well as treatment plan/ rationale and result expectations have been discussed with the patient who expresses understanding and desires to proceed.     Close follow up to evaluate treatment

## 2020-01-16 RX ORDER — PRAVASTATIN SODIUM 40 MG
TABLET ORAL
Qty: 90 TABLET | Refills: 3 | Status: SHIPPED | OUTPATIENT
Start: 2020-01-16 | End: 2020-03-13 | Stop reason: SDUPTHER

## 2020-01-22 ENCOUNTER — TELEPHONE (OUTPATIENT)
Dept: PHARMACY | Facility: CLINIC | Age: 55
End: 2020-01-22

## 2020-01-22 NOTE — TELEPHONE ENCOUNTER
Called patient and scheduled pharmacist appointment to discuss medications for Diabetes Management Program.       Reina Riley CPhT.   Pharmacy [facility]  Department, toll free: [phone], option 7

## 2020-01-24 ENCOUNTER — SCHEDULED TELEPHONE ENCOUNTER (OUTPATIENT)
Dept: PHARMACY | Facility: CLINIC | Age: 55
End: 2020-01-24

## 2020-01-24 RX ORDER — THIAMINE HCL 100 MG
1 TABLET ORAL DAILY
COMMUNITY

## 2020-01-24 NOTE — TELEPHONE ENCOUNTER
LABA1C 6.6 (H) 02/11/2019     Lab Results   Component Value Date    CHOL 138 02/11/2019    TRIG 171 (H) 02/11/2019    HDL 30 (L) 02/11/2019    LDLCALC 74 02/11/2019    LDLDIRECT 91 08/27/2014     ALT   Date Value Ref Range Status   09/25/2019 19 0 - 41 U/L Final     AST   Date Value Ref Range Status   09/25/2019 12 0 - 40 U/L Final     The 10-year ASCVD risk score (Glendy Saavedra, et al., 2013) is: 15.2%    Values used to calculate the score:      Age: 49 years      Sex: Male      Is Non- : No      Diabetic: Yes      Tobacco smoker: Yes      Systolic Blood Pressure: 209 mmHg      Is BP treated: No      HDL Cholesterol: 30 mg/dL      Total Cholesterol: 138 mg/dL     Lab Results   Component Value Date    CREATININE 0.59 (L) 09/25/2019     CrCl cannot be calculated (Patient's most recent lab result is older than the maximum 120 days allowed. ).   eGFR: >60 mL/min/1.73 m^2    Immunizations:  Immunization History   Administered Date(s) Administered    Fluvirin 4 years and over 10/29/2015    Influenza 10/07/2011, 01/30/2014    Influenza Vaccine, unspecified formulation 10/07/1995, 10/02/1996, 10/02/1998, 10/25/1999, 12/05/2000, 10/23/2002, 02/01/2005, 10/07/2011, 01/30/2014, 10/14/2014, 10/29/2015, 10/26/2017    Influenza Virus Vaccine 10/07/1995, 10/02/1996, 10/02/1998, 10/25/1999, 12/05/2000, 10/23/2002, 02/01/2005, 10/14/2014    Influenza, Quadv, IM, (6 mo and older Fluzone, Flulaval, Fluarix and 3 yrs and older Afluria) 10/02/2018, 09/25/2019    Pneumococcal Polysaccharide ([id number]) 01/30/2014, 10/26/2017    Tdap (Boostrix, Adacel) 02/11/2016      Social History:  Social History     Tobacco Use    Smoking status: Current Every Day Smoker     Packs/day: 1.00     Years: 30.00     Pack years: 30.00     Types: Cigarettes     Start date: 9/25/1989    Smokeless tobacco: Never Used   Substance Use Topics    Alcohol use: No     Alcohol/week: 0.0 standard drinks     ASSESSMENT:  Initial Program Requirements (Y indicates has completed for the year, N indicates needs to be completed by 7/1/20): Yes - OV with provider for DM (1st) - 1/7/20  Yes - A1c (1st) - 1/7/20     Ongoing Program Requirements (Y indicates has completed for the year, N indicates needs to be completed by 12/31/20): No - OV with provider for DM (2nd) - scheduled for 4/8/20  No - ACC/diabetes educator visit (if A1c over 8%) - worked with Paola Marie, [address] in Dec 2019. No - A1c (2nd)  No - Lipid panel  No - Urine microalbumin  Yes - Pneumococcal vaccination: Received Pneumovax 23 in 2014 and 2017 per chart review  No - Influenza vaccination for Fall 2020  Yes - Medication adherence over 70% - patient is on insulin  Yes - On statin or contraindication(s) Pravastatin (LF 1/22/20 for 90ds with 3 RR)  Yes - On ACEi/ARB or contraindication(s) Lisinopril (LF 8/20/19 for 90ds with 3RR)     Formulary Medication Review:  Non-formulary or medications with cost-effective alternatives: N/A. Current medications eligible for copay waiver, up to $600, through [address] [place]:  - Aspirin (with prescription), Jardiance, Tresiba, Jentadueto, Lisinopril, Pravastatin  - Generic ([facility] pharmacy-stocked) insulin syringes and pen needles  - Agamatrix or Prodigy meter and supplies  - Dexcom G6 supplies     Diabetes Care:   - Glycemic Goal: <7.0%. Is not at blood glucose goal but is on Jardiance, Tresiba, and [place] therapy. - Duplicate MOA: N/A  - Reduce Pill [place]: Currently on Jentadueto  - Appropriateness of Insulin Therapy: Managed by PCP  - Therapy Optimization: Jardiance dose recently increased (1/7/20). Patient will start increased dose and revisit PCP in April. Patient attributes A1c increase to him starting drinking pop again (which he has stopped again). Patient also said he was \"starving himself\" when his A1c was 6.6. He said that was not sustainable.  Would like to see what increase Jardiance and lifestyle changes do to A1c

## 2020-02-21 ENCOUNTER — TELEPHONE (OUTPATIENT)
Dept: FAMILY MEDICINE CLINIC | Age: 55
End: 2020-02-21

## 2020-02-25 NOTE — TELEPHONE ENCOUNTER
Spoke to pharmacy stated that 5 pens come together, I stated that was fine to dispense the 5 pens instead of 3.

## 2020-02-27 NOTE — TELEPHONE ENCOUNTER
Alejandrina Rand pt. Monique Garzon from [place] calling to ask how often this pt should be injecting [place]? No directions are on medication. Monique Garzon needing an answer by tomorrow so they can ship it out. Can you possibly help with this?   [phone]

## 2020-03-14 RX ORDER — ESCITALOPRAM OXALATE 10 MG/1
10 TABLET ORAL DAILY
Qty: 90 TABLET | Refills: 1 | Status: SHIPPED
Start: 2020-03-14 | End: 2020-11-05 | Stop reason: CLARIF

## 2020-03-14 RX ORDER — OMEPRAZOLE 20 MG/1
20 CAPSULE, DELAYED RELEASE ORAL
Qty: 90 CAPSULE | Refills: 1 | Status: SHIPPED | OUTPATIENT
Start: 2020-03-14 | End: 2020-09-07 | Stop reason: SDUPTHER

## 2020-03-14 RX ORDER — INSULIN DEGLUDEC INJECTION 100 U/ML
INJECTION, SOLUTION SUBCUTANEOUS
Qty: 3 PEN | Refills: 1 | Status: SHIPPED | OUTPATIENT
Start: 2020-03-14 | End: 2020-04-08 | Stop reason: SDUPTHER

## 2020-03-14 RX ORDER — SILDENAFIL CITRATE 20 MG/1
TABLET ORAL
Qty: 120 TABLET | Refills: 1 | Status: SHIPPED
Start: 2020-03-14 | End: 2020-04-08 | Stop reason: CLARIF

## 2020-03-14 RX ORDER — EMPAGLIFLOZIN 25 MG/1
25 TABLET, FILM COATED ORAL DAILY
Qty: 90 TABLET | Refills: 1 | Status: SHIPPED | OUTPATIENT
Start: 2020-03-14 | End: 2020-09-07 | Stop reason: SDUPTHER

## 2020-03-14 RX ORDER — ASPIRIN 81 MG/1
81 TABLET ORAL DAILY
Qty: 90 TABLET | Refills: 1 | Status: SHIPPED | OUTPATIENT
Start: 2020-03-14 | End: 2020-09-07 | Stop reason: SDUPTHER

## 2020-03-16 RX ORDER — LISINOPRIL 10 MG/1
TABLET ORAL
Qty: 90 TABLET | Refills: 3 | Status: SHIPPED | OUTPATIENT
Start: 2020-03-16 | End: 2020-09-07 | Stop reason: SDUPTHER

## 2020-03-16 RX ORDER — PRAVASTATIN SODIUM 40 MG
TABLET ORAL
Qty: 90 TABLET | Refills: 3 | Status: SHIPPED | OUTPATIENT
Start: 2020-03-16 | End: 2020-09-07 | Stop reason: SDUPTHER

## 2020-04-07 ENCOUNTER — TELEPHONE (OUTPATIENT)
Dept: FAMILY MEDICINE CLINIC | Age: 55
End: 2020-04-07

## 2020-04-08 ENCOUNTER — TELEPHONE (OUTPATIENT)
Dept: FAMILY MEDICINE CLINIC | Age: 55
End: 2020-04-08

## 2020-04-08 ENCOUNTER — VIRTUAL VISIT (OUTPATIENT)
Dept: FAMILY MEDICINE CLINIC | Age: 55
End: 2020-04-08
Payer: COMMERCIAL

## 2020-04-08 VITALS — BODY MASS INDEX: 41.2 KG/M2 | WEIGHT: 240 LBS

## 2020-04-08 PROCEDURE — 99214 OFFICE O/P EST MOD 30 MIN: CPT | Performed by: NURSE PRACTITIONER

## 2020-04-08 RX ORDER — TADALAFIL 5 MG/1
5 TABLET ORAL DAILY PRN
Qty: 30 TABLET | Refills: 5 | Status: SHIPPED | OUTPATIENT
Start: 2020-04-08 | End: 2020-05-26 | Stop reason: SDUPTHER

## 2020-04-08 RX ORDER — SEMAGLUTIDE 1.34 MG/ML
0.25 INJECTION, SOLUTION SUBCUTANEOUS WEEKLY
Qty: 3 PEN | Refills: 1 | Status: SHIPPED | OUTPATIENT
Start: 2020-04-08 | End: 2020-05-26 | Stop reason: SDUPTHER

## 2020-04-08 RX ORDER — SEMAGLUTIDE 1.34 MG/ML
0.25 INJECTION, SOLUTION SUBCUTANEOUS WEEKLY
Qty: 3 PEN | Refills: 1 | Status: SHIPPED | OUTPATIENT
Start: 2020-04-08 | End: 2020-04-08 | Stop reason: SDUPTHER

## 2020-04-08 RX ORDER — INSULIN DEGLUDEC INJECTION 100 U/ML
INJECTION, SOLUTION SUBCUTANEOUS
Qty: 3 PEN | Refills: 1 | Status: SHIPPED | OUTPATIENT
Start: 2020-04-08 | End: 2020-09-07 | Stop reason: SDUPTHER

## 2020-04-08 ASSESSMENT — ENCOUNTER SYMPTOMS
COUGH: 0
SHORTNESS OF BREATH: 0

## 2020-04-08 NOTE — PROGRESS NOTES
2020    TELEHEALTH EVALUATION -- Audio/Visual (During NZAWD-80 public health emergency)    Due to [place] 19 outbreak, patient's office visit was converted to a virtual visit. Patient was contacted and agreed to proceed with a virtual visit via Doxy. me  The risks and benefits of converting to a virtual visit were discussed in light of the current infectious disease epidemic. Patient also understood that insurance coverage and co-pays are up to their individual insurance plans. HPI:    Leeanna Friedman (:  1965) has requested an audio/video evaluation for the following concern(s):    DM-150's lately. Checks BS in the AM. Thinks that is is elevated other times of the day. Concerned about ED. Doesn't feel like the revatio works. Asking if there is another option. ? Otherwise pt feeling well with no major concerns. Review of Systems   Constitutional: Negative for fatigue. Respiratory: Negative for cough and shortness of breath. Cardiovascular: Negative for chest pain. Prior to Visit Medications    Medication Sig Taking?  Authorizing Provider   tadalafil (CIALIS) 5 MG tablet Take 1 tablet by mouth daily as needed for Erectile Dysfunction Yes NATALEE Silverio CNP   Insulin Degludec (TRESIBA FLEXTOUCH) 100 UNIT/ML SOPN 40 units each Yes NATALEE Silverio CNP   Semaglutide,0.25 or 0.5MG/DOS, (OZEMPIC, 0.25 OR 0.5 MG/DOSE,) 2 MG/1.5ML SOPN Inject 0.25 mg into the skin once a week Yes NATALEE Silverio CNP   metFORMIN (GLUCOPHAGE) 1000 MG tablet Take 1 tablet by mouth 2 times daily (with meals) Yes NATALEE Silverio CNP   Insulin Pen Needle (NOVOFINE PLUS) 32G X 4 MM MISC 1 each by Does not apply route daily Yes NATALEE Silverio CNP   pravastatin (PRAVACHOL) 40 MG tablet TAKE ONE TABLET BY MOUTH ONE TIME A DAY AT BEDTIME FOR CHOLESTEROL Yes NATALEE Silverio CNP   lisinopril (PRINIVIL;ZESTRIL) 10 MG tablet TAKE 1 TABLET BY MOUTH ONE TIME DAILY Yes Alva Beavers NATALEE Valdivia CNP   aspirin EC 81 MG EC tablet Take 1 tablet by mouth daily Yes NATALEE Beltran CNP   omeprazole (PRILOSEC) 20 MG delayed release capsule Take 1 capsule by mouth every morning (before breakfast) Yes NATALEE Beltran CNP   empagliflozin (JARDIANCE) 25 MG tablet Take 25 mg by mouth daily Yes NATALEE Beltran CNP   escitalopram (LEXAPRO) 10 MG tablet Take 1 tablet by mouth daily Yes NATALEE Beltran CNP   Magnesium 500 MG TABS Take 1 tablet by mouth daily Yes Historical Provider, MD   BEE POLLEN PO Take 1 capsule by mouth 3 times daily  Yes Historical Provider, MD   Cyanocobalamin (VITAMIN B 12 PO) Take 1,000 mg by mouth daily Yes Historical Provider, MD   Multiple Vitamins-Minerals (ONE DAILY MULTIVITAMIN ADULT) TABS Take 1 tablet by mouth daily  Yes Historical Provider, MD   Blood Glucose Monitoring Suppl (PRODIGY AUTOCODE BLOOD GLUCOSE) w/Device KIT Use to test once daily and as needed as directed by provider Yes Peg Grumbles, DO   PRODIGY LANCETS 28G MISC Use to test once daily and as needed as directed by provider Yes Peg Grumbles, DO   blood glucose test strips (PRODIGY NO CODING BLOOD GLUC) strip Use to test once daily and as needed as directed by provider Yes Peg Grumbles, DO       Social History     Tobacco Use    Smoking status: Current Every Day Smoker     Packs/day: 1.00     Years: 30.00     Pack years: 30.00     Types: Cigarettes     Start date: 9/25/1989    Smokeless tobacco: Never Used   Substance Use Topics    Alcohol use: No     Alcohol/week: 0.0 standard drinks    Drug use: No        No Known Allergies,   Past Medical History:   Diagnosis Date    Diabetes mellitus ([facility]    , No past surgical history on file.,   Social History     Tobacco Use    Smoking status: Current Every Day Smoker     Packs/day: 1.00     Years: 30.00     Pack years: 30.00     Types: Cigarettes     Start date: 9/25/1989    Smokeless tobacco: Never Used   Substance Use Topics  Alcohol use: No     Alcohol/week: 0.0 standard drinks    Drug use: No   ,   Family History   Problem Relation Age of Onset    High Blood Pressure Mother     Diabetes Father     Cancer Paternal Uncle     Diabetes Paternal Grandmother     Breast Cancer Neg Hx     Colon Cancer Neg Hx     Heart Attack Neg Hx     High Cholesterol Neg Hx     Stroke Neg Hx        PHYSICAL EXAMINATION:  [ INSTRUCTIONS:  \"[x]\" Indicates a positive item  \"[]\" Indicates a negative item  -- DELETE ALL ITEMS NOT EXAMINED]  [x] Alert  [x] Oriented to person/place/time    [] No apparent distress  [] Toxic appearing    [] Face flushed appearing [x] Sclera clear  [] Lips are cyanotic      [x] Breathing appears normal  [] Appears tachypneic      [] Rash on visible skin    [x] Cranial Nerves II-XII grossly intact    [x] Motor grossly intact in visible upper extremities    [x] Motor grossly intact in visible lower extremities    [x] Normal Mood  [] Anxious appearing    [] Depressed appearing  [] Confused appearing      [] Poor short term memory  [] Poor long term memory    [] OTHER:      Due to this being a TeleHealth encounter, evaluation of the following organ systems is limited: Vitals/Constitutional/EENT/Resp/CV/GI//MS/Neuro/Skin/Heme-Lymph-Imm. ASSESSMENT/PLAN:    1. Erectile dysfunction, unspecified erectile dysfunction type    - tadalafil (CIALIS) 5 MG tablet; Take 1 tablet by mouth daily as needed for Erectile Dysfunction  Dispense: 30 tablet; Refill: 5    2. Benign prostatic hyperplasia without lower urinary tract symptoms    - tadalafil (CIALIS) 5 MG tablet; Take 1 tablet by mouth daily as needed for Erectile Dysfunction  Dispense: 30 tablet; Refill: 5    3.  Type 2 diabetes mellitus with microalbuminuria, with long-term current use of insulin (HCC)    - Insulin Degludec (TRESIBA FLEXTOUCH) 100 UNIT/ML SOPN; 40 units each  Dispense: 3 pen; Refill: 1  - Semaglutide,0.25 or 0.5MG/DOS, (OZEMPIC, 0.25 OR 0.5 MG/DOSE,) 2 MG/1.5ML

## 2020-04-08 NOTE — TELEPHONE ENCOUNTER
ya that won't work. i'm going to send it into mail order to check it out.  pls let pt know that I am going to try and see about mail order coverage

## 2020-05-06 ENCOUNTER — VIRTUAL VISIT (OUTPATIENT)
Dept: FAMILY MEDICINE CLINIC | Age: 55
End: 2020-05-06
Payer: COMMERCIAL

## 2020-05-06 PROCEDURE — 99213 OFFICE O/P EST LOW 20 MIN: CPT | Performed by: NURSE PRACTITIONER

## 2020-05-06 ASSESSMENT — ENCOUNTER SYMPTOMS
SHORTNESS OF BREATH: 0
COUGH: 0

## 2020-05-06 NOTE — PROGRESS NOTES
2020    TELEHEALTH EVALUATION -- Audio/Visual (During TXBJY-06 public health emergency)    Due to COVID 19 outbreak, patient's office visit was converted to a virtual visit. Patient was contacted and agreed to proceed with a virtual visit via Doxy. me  The risks and benefits of converting to a virtual visit were discussed in light of the current infectious disease epidemic. Patient also understood that insurance coverage and co-pays are up to their individual insurance plans. HPI:    Alejandro Woodruff (:  1965) has requested an audio/video evaluation for the following concern(s):    Started ozempic since last visit. On third week. No side effects. Not much change in BS    Overall feeling well. Has not switched over the the cialis yet    Review of Systems   Constitutional: Negative for fatigue. Respiratory: Negative for cough and shortness of breath. Cardiovascular: Negative for chest pain. Prior to Visit Medications    Medication Sig Taking?  Authorizing Provider   tadalafil (CIALIS) 5 MG tablet Take 1 tablet by mouth daily as needed for Erectile Dysfunction Yes NATALEE Souza CNP   Insulin Degludec (TRESIBA FLEXTOUCH) 100 UNIT/ML SOPN 40 units each Yes NATALEE Souza CNP   metFORMIN (GLUCOPHAGE) 1000 MG tablet Take 1 tablet by mouth 2 times daily (with meals) Yes NATALEE Souza CNP   Semaglutide,0.25 or 0.5MG/DOS, (OZEMPIC, 0.25 OR 0.5 MG/DOSE,) 2 MG/1.5ML SOPN Inject 0.25 mg into the skin once a week Yes NATALEE Souza CNP   Insulin Pen Needle (NOVOFINE PLUS) 32G X 4 MM MISC 1 each by Does not apply route daily Yes NATALEE Souza CNP   pravastatin (PRAVACHOL) 40 MG tablet TAKE ONE TABLET BY MOUTH ONE TIME A DAY AT BEDTIME FOR CHOLESTEROL Yes NATALEE Souza CNP   lisinopril (PRINIVIL;ZESTRIL) 10 MG tablet TAKE 1 TABLET BY MOUTH ONE TIME DAILY Yes NATALEE Souza CNP   aspirin EC 81 MG EC tablet Take 1 tablet by mouth daily Yes NATALEE Mariano CNP   omeprazole (PRILOSEC) 20 MG delayed release capsule Take 1 capsule by mouth every morning (before breakfast) Yes NATALEE Mariano CNP   empagliflozin (JARDIANCE) 25 MG tablet Take 25 mg by mouth daily Yes NATALEE Mariano CNP   escitalopram (LEXAPRO) 10 MG tablet Take 1 tablet by mouth daily Yes NATALEE Mariano CNP   Magnesium 500 MG TABS Take 1 tablet by mouth daily Yes Historical Provider, MD   BEE POLLEN PO Take 1 capsule by mouth 3 times daily  Yes Historical Provider, MD   Cyanocobalamin (VITAMIN B 12 PO) Take 1,000 mg by mouth daily Yes Historical Provider, MD   Multiple Vitamins-Minerals (ONE DAILY MULTIVITAMIN ADULT) TABS Take 1 tablet by mouth daily  Yes Historical Provider, MD   Blood Glucose Monitoring Suppl (PRODIGY AUTOCODE BLOOD GLUCOSE) w/Device KIT Use to test once daily and as needed as directed by provider Yes Clemencia Rowland DO   PRODIGY LANCETS 28G MISC Use to test once daily and as needed as directed by provider Yes Clemencia Rowland DO   blood glucose test strips (PRODIGY NO CODING BLOOD GLUC) strip Use to test once daily and as needed as directed by provider Yes Clemencia Rowland DO       Social History     Tobacco Use    Smoking status: Current Every Day Smoker     Packs/day: 1.00     Years: 30.00     Pack years: 30.00     Types: Cigarettes     Start date: 9/25/1989    Smokeless tobacco: Never Used   Substance Use Topics    Alcohol use: No     Alcohol/week: 0.0 standard drinks    Drug use: No        No Known Allergies,   Past Medical History:   Diagnosis Date    Diabetes mellitus ([facility]    , No past surgical history on file.,   Social History     Tobacco Use    Smoking status: Current Every Day Smoker     Packs/day: 1.00     Years: 30.00     Pack years: 30.00     Types: Cigarettes     Start date: 9/25/1989    Smokeless tobacco: Never Used   Substance Use Topics    Alcohol use: No     Alcohol/week: 0.0 standard drinks    Drug use: No   , proceed to the nearest emergency room. Return in about 3 months (around 8/6/2020), or in office for DM . An  electronic signature was used to authenticate this note. --NATALEE Monroy - CNP on 5/6/2020 at 7:43 AM        Pursuant to the emergency declaration under the [address] [place] [address], [facility] waiver authority and the [employer] Act, this Virtual  Visit was conducted, with patient's consent, to reduce the patient's risk of exposure to COVID-19 and provide continuity of care for an established patient. Services were provided through a video synchronous discussion virtually to substitute for in-person clinic visit.

## 2020-05-26 ENCOUNTER — PATIENT MESSAGE (OUTPATIENT)
Dept: FAMILY MEDICINE CLINIC | Age: 55
End: 2020-05-26

## 2020-05-26 RX ORDER — TADALAFIL 5 MG/1
5 TABLET ORAL DAILY PRN
Qty: 30 TABLET | Refills: 5 | Status: SHIPPED | OUTPATIENT
Start: 2020-05-26 | End: 2021-05-15 | Stop reason: SDUPTHER

## 2020-05-26 NOTE — TELEPHONE ENCOUNTER
From: hO Galvan  To: NATALEE Angeles - CNP  Sent: 5/26/2020 11:09 AM EDT  Subject: Prescription Question    Good Morning Carrie Muller all is well with you, good here. I finally received my Good Rx card in the mail. I never picked up the prescription for Tadafil at [facility]. Called [facility] this morning and found out the cost was $154.00 (with insurance & Good Rx) for 30 days. I found a coupon on the Good Rx website for $32.00 for 30 days at [address] (copy attached). If you're good with this, my [address] is located at: [address] [place] [address].  Take Care & Thanks, Norm

## 2020-05-27 RX ORDER — SEMAGLUTIDE 1.34 MG/ML
0.25 INJECTION, SOLUTION SUBCUTANEOUS WEEKLY
Qty: 3 PEN | Refills: 1 | Status: SHIPPED | OUTPATIENT
Start: 2020-05-27 | End: 2020-07-25 | Stop reason: SDUPTHER

## 2020-06-22 ENCOUNTER — TELEPHONE (OUTPATIENT)
Dept: PHARMACY | Facility: CLINIC | Age: 55
End: 2020-06-22

## 2020-06-22 NOTE — TELEPHONE ENCOUNTER
CLINICAL PHARMACY CONSULT: MEDICATION CONVERSION INITIATIVE    Anton Pham is a 49 y.o. male currently identified with current prescription for Jentadueto. Starting 7/1/2020, all DPP4 inhibitors EXCEPT Januvia, Janumet, and Janumet XR are going to be excluded from patient's pharmacy benefit for ministry and patient cost savings.     Per chart review, appears pt recently started Ozempic and Manisha Stare was discontinued, current DM regimen does not include linagliptin    Identified care gap(s): A1c > 9%    DM Program Prescriptions:  Medication Sig    metFORMIN (GLUCOPHAGE) 1000 MG tablet Take 1 tablet by mouth 2 times daily (with meals)  - see fill history below    Semaglutide,0.25 or 0.5MG/DOS, (OZEMPIC, 0.25 OR 0.5 MG/DOSE,) 2 MG/1.5ML SOPN Inject 0.25 mg into the skin once a week  - see fill history below    Insulin Degludec (TRESIBA FLEXTOUCH) 100 UNIT/ML SOPN 40 units each  - see fill history below    Insulin Pen Needle (NOVOFINE PLUS) 32G X 4 MM MISC 1 each by Does not apply route daily  - on preferred pen needles - last filled 3/16/20 x 200    pravastatin (PRAVACHOL) 40 MG tablet TAKE ONE TABLET BY MOUTH ONE TIME A DAY AT BEDTIME FOR CHOLESTEROL  - last fill 3/16/20 (but appears to have surplus from previous fill)    lisinopril (PRINIVIL;ZESTRIL) 10 MG tablet TAKE 1 TABLET BY MOUTH ONE TIME DAILY  - LF 3/18/20 x 90 ds (but appears to have surplus from previous fill)    aspirin EC 81 MG EC tablet Take 1 tablet by mouth daily  - LF 3/16/20 x 90 ds (but appears to have surplus from previous fills)    empagliflozin (JARDIANCE) 25 MG tablet Take 25 mg by mouth daily  - see fill history below    Blood Glucose Monitoring Suppl (PRODIGY AUTOCODE BLOOD GLUCOSE) w/Device KIT Use to test once daily and as needed as directed by provider    PRODIGY LANCETS 28G MISC Use to test once daily and as needed as directed by provider    blood glucose test strips (PRODIGY NO CODING BLOOD GLUC) strip Use to test once daily and as needed as directed by provider     Allergies:  No Known Allergies     Labs:  Lab Results   Component Value Date    LABA1C 11.4 2020    LABA1C 9.1 (H) 2019    LABA1C 6.6 (H) 2019     Component      Latest Ref Rng & Units 2019 2019 2/10/2018          10:43 AM  8:01 AM  8:15 AM   GFR Non-      >60 >60.0 >60.0 >60.0     Wt Readings from Last 3 Encounters:   20 240 lb (108.9 kg)   20 250 lb (113.4 kg)   19 244 lb 9.6 oz (110.9 kg)      Care Team:   - Physician (PCP/Endocrinologist): NATALEE Lang - CNP (Last OV: 20 (VV); Next OV: 2020)    Diabetes Care:  - Glycemic Goal: <7.0%. Is not at blood glucose goal. Current regimen metformin 1000 mg BID, Jardiance 25 mg daily, Tresiba U100 40 units daily, Ozempic - 0.25 mg once weekly but believe plan is for titration per review of recent VVs. Per 2020 [facility] visit pt attributed A1c increased on resuming pop intake which he has since stopped  · Medication changes since last A1c: Jardiance and Tresiba doses increased; stopped Tradjenta (was on Jentadueto) and changed to Ozempic  · Medication Adherence Assessment:   · Jardiance:  · 10 m19, 19, 12/18/19 x 90 ds  · 25 m20, 3/16/20 x 90 ds  · Jentadueto: 19, 19, 10/28/19, 19, 20, 20, 3/16/20 x 30 ds (60 tabs)  · Metformin: 4/8/20 x 30 ds (60 tabs) (only claim noted)  · Ozempic: 4/10/20, 5/27/20 x 56 ds (1.5 mL)  · Tresiba: 10/4/19 x 90 ds (45 mL); 20, 4/10/20 x 75 ds (30 mL)    Attempting to reach patient. No answer, LM. Would like to review:   Discuss Jentadueto and confirm no longer taking (re: conversion)   Ozempic tolerability and dosing   Assist with getting metformin RX from [facility] - appears due to be filled?  Previously filled at outside pharmacy, Rx sent to [facility] yesterday but do not yet see in process per [employer]   Assist with any refills at this time   Discuss lancing device - per yearly [facility] visit did not prefer Prodigy   Does he have adequate testing supplies?  No fills noted    Ebenezer Titus, PharmD, [facility] Pharmacist  Dept: [phone] (toll free [phone], option 7)

## 2020-07-27 RX ORDER — SEMAGLUTIDE 1.34 MG/ML
0.25 INJECTION, SOLUTION SUBCUTANEOUS WEEKLY
Qty: 3 PEN | Refills: 1 | Status: SHIPPED | OUTPATIENT
Start: 2020-07-27 | End: 2020-09-07 | Stop reason: SDUPTHER

## 2020-08-06 ENCOUNTER — OFFICE VISIT (OUTPATIENT)
Dept: FAMILY MEDICINE CLINIC | Age: 55
End: 2020-08-06
Payer: COMMERCIAL

## 2020-08-06 VITALS
DIASTOLIC BLOOD PRESSURE: 70 MMHG | SYSTOLIC BLOOD PRESSURE: 136 MMHG | OXYGEN SATURATION: 98 % | WEIGHT: 240 LBS | BODY MASS INDEX: 39.99 KG/M2 | HEART RATE: 90 BPM | HEIGHT: 65 IN | TEMPERATURE: 97.1 F

## 2020-08-06 DIAGNOSIS — R80.9 TYPE 2 DIABETES MELLITUS WITH MICROALBUMINURIA, WITH LONG-TERM CURRENT USE OF INSULIN (HCC): ICD-10-CM

## 2020-08-06 DIAGNOSIS — Z79.4 TYPE 2 DIABETES MELLITUS WITH MICROALBUMINURIA, WITH LONG-TERM CURRENT USE OF INSULIN (HCC): ICD-10-CM

## 2020-08-06 DIAGNOSIS — E11.29 TYPE 2 DIABETES MELLITUS WITH MICROALBUMINURIA, WITH LONG-TERM CURRENT USE OF INSULIN (HCC): ICD-10-CM

## 2020-08-06 DIAGNOSIS — E78.5 HYPERLIPIDEMIA, UNSPECIFIED HYPERLIPIDEMIA TYPE: ICD-10-CM

## 2020-08-06 LAB
ALBUMIN SERPL-MCNC: 4.3 G/DL (ref 3.5–4.6)
ALP BLD-CCNC: 65 U/L (ref 35–104)
ALT SERPL-CCNC: 15 U/L (ref 0–41)
ANION GAP SERPL CALCULATED.3IONS-SCNC: 10 MEQ/L (ref 9–15)
AST SERPL-CCNC: 14 U/L (ref 0–40)
BILIRUB SERPL-MCNC: 0.3 MG/DL (ref 0.2–0.7)
BUN BLDV-MCNC: 11 MG/DL (ref 6–20)
CALCIUM SERPL-MCNC: 8.9 MG/DL (ref 8.5–9.9)
CHLORIDE BLD-SCNC: 100 MEQ/L (ref 95–107)
CHOLESTEROL, TOTAL: 126 MG/DL (ref 0–199)
CO2: 24 MEQ/L (ref 20–31)
CREAT SERPL-MCNC: 0.6 MG/DL (ref 0.7–1.2)
GFR AFRICAN AMERICAN: >60
GFR NON-AFRICAN AMERICAN: >60
GLOBULIN: 3.3 G/DL (ref 2.3–3.5)
GLUCOSE BLD-MCNC: 108 MG/DL (ref 70–99)
HBA1C MFR BLD: 7.7 % (ref 4.8–5.9)
HDLC SERPL-MCNC: 27 MG/DL (ref 40–59)
LDL CHOLESTEROL CALCULATED: 49 MG/DL (ref 0–129)
POTASSIUM SERPL-SCNC: 4.3 MEQ/L (ref 3.4–4.9)
SODIUM BLD-SCNC: 134 MEQ/L (ref 135–144)
TOTAL PROTEIN: 7.6 G/DL (ref 6.3–8)
TRIGL SERPL-MCNC: 250 MG/DL (ref 0–150)

## 2020-08-06 PROCEDURE — 99214 OFFICE O/P EST MOD 30 MIN: CPT | Performed by: NURSE PRACTITIONER

## 2020-08-06 ASSESSMENT — ENCOUNTER SYMPTOMS
SHORTNESS OF BREATH: 0
COUGH: 0

## 2020-08-06 NOTE — PROGRESS NOTES
Subjective  Chief Complaint   Patient presents with    3 Month Follow-Up    Diabetes    Health Maintenance     has cologuard       Diabetes   He presents for his follow-up diabetic visit. He has type 2 diabetes mellitus. His disease course has been stable. There are no hypoglycemic associated symptoms. Associated symptoms include foot paresthesias. Pertinent negatives for diabetes include no chest pain, no fatigue, no polydipsia, no polyphagia and no polyuria. There are no hypoglycemic complications. Symptoms are stable. Diabetic complications include peripheral neuropathy. Risk factors for coronary artery disease include dyslipidemia, diabetes mellitus, obesity, male sex and sedentary lifestyle. Current diabetic treatment includes oral agent (triple therapy). He is compliant with treatment most of the time. His weight is stable. He is following a diabetic diet. When asked about meal planning, he reported none. He has not had a previous visit with a dietitian. There is no change in his home blood glucose trend. An ACE inhibitor/angiotensin II receptor blocker is being taken. Patient Active Problem List    Diagnosis Date Noted    Type 2 diabetes mellitus with microalbuminuria ([facility] [address] 09/26/2014     Priority: Low    Encounter for long-term (current) insulin use ([address] 09/26/2014     Priority: Low    Hyperlipidemia 09/26/2014     Priority: Low    Left leg paresthesias 02/11/2019    Erectile dysfunction associated with type 2 diabetes mellitus ([address] 02/09/2018    Tear of right retina without detachment 02/14/2014    Type 2 diabetes mellitus with peripheral neuropathy ([address] 04/10/2013    Periodic limb movement disorder 03/08/2009    Obstructive sleep apnea 03/08/2009    History of peptic ulcer disease 06/01/1995     Past Medical History:   Diagnosis Date    Diabetes mellitus ([address]      No past surgical history on file.   Family History   Problem Relation Age of Onset    High Blood Pressure Mother    Seema Chauhan Eval and Treat     Referral Reason:   Specialty Services Required     Requested Specialty:   Cardiology     Number of Visits Requested:   1     Side effects, adverse effects of the medication prescribed today, as well as treatment plan/ rationale and result expectations have been discussed with the patient who expresses understanding and desires to proceed. Close follow up to evaluate treatment results and for coordination of care. I have reviewed the patient's medical history in detail and updated the computerized patient record. As always, patient is advised that if symptoms worsen in any way they will proceed to the nearest emergency room. No orders of the defined types were placed in this encounter.     Fu in 4-6 mos depending on Rakesh Vale, APRN - CNP

## 2020-09-08 RX ORDER — PRAVASTATIN SODIUM 40 MG
TABLET ORAL
Qty: 90 TABLET | Refills: 3 | Status: SHIPPED | OUTPATIENT
Start: 2020-09-08 | End: 2020-12-12 | Stop reason: SDUPTHER

## 2020-09-08 RX ORDER — INSULIN DEGLUDEC INJECTION 100 U/ML
INJECTION, SOLUTION SUBCUTANEOUS
Qty: 3 PEN | Refills: 1 | Status: SHIPPED | OUTPATIENT
Start: 2020-09-08 | End: 2020-11-05 | Stop reason: SDUPTHER

## 2020-09-08 RX ORDER — LISINOPRIL 10 MG/1
TABLET ORAL
Qty: 90 TABLET | Refills: 3 | Status: SHIPPED | OUTPATIENT
Start: 2020-09-08 | End: 2020-12-12 | Stop reason: SDUPTHER

## 2020-09-08 RX ORDER — EMPAGLIFLOZIN 25 MG/1
25 TABLET, FILM COATED ORAL DAILY
Qty: 90 TABLET | Refills: 1 | Status: SHIPPED | OUTPATIENT
Start: 2020-09-08 | End: 2020-12-12 | Stop reason: SDUPTHER

## 2020-09-08 RX ORDER — OMEPRAZOLE 20 MG/1
20 CAPSULE, DELAYED RELEASE ORAL
Qty: 90 CAPSULE | Refills: 1 | Status: SHIPPED | OUTPATIENT
Start: 2020-09-08 | End: 2021-07-26 | Stop reason: SDUPTHER

## 2020-09-08 RX ORDER — ASPIRIN 81 MG/1
81 TABLET ORAL DAILY
Qty: 90 TABLET | Refills: 1 | Status: SHIPPED | OUTPATIENT
Start: 2020-09-08 | End: 2020-12-12 | Stop reason: SDUPTHER

## 2020-09-08 RX ORDER — SEMAGLUTIDE 1.34 MG/ML
0.25 INJECTION, SOLUTION SUBCUTANEOUS WEEKLY
Qty: 3 PEN | Refills: 1 | Status: SHIPPED | OUTPATIENT
Start: 2020-09-08 | End: 2020-11-02 | Stop reason: SDUPTHER

## 2020-10-16 ENCOUNTER — NURSE ONLY (OUTPATIENT)
Dept: PRIMARY CARE CLINIC | Age: 55
End: 2020-10-16
Payer: COMMERCIAL

## 2020-10-16 PROCEDURE — 90471 IMMUNIZATION ADMIN: CPT | Performed by: NURSE PRACTITIONER

## 2020-10-16 PROCEDURE — 90688 IIV4 VACCINE SPLT 0.5 ML IM: CPT | Performed by: NURSE PRACTITIONER

## 2020-10-16 NOTE — PROGRESS NOTES
Vaccine Information Sheet, \"Influenza - Inactivated\"  given to Allison Rahman, or parent/legal guardian of  Allison Rahman and verbalized understanding. Patient responses:    Have you ever had a reaction to a flu vaccine? NO  Are you able to eat eggs without adverse effects? YES  Do you have any current illness? NO  Have you ever had Guillian [place] Syndrome? NO    Flu vaccine given per order. Please see immunization tab.

## 2020-11-03 RX ORDER — SEMAGLUTIDE 1.34 MG/ML
0.25 INJECTION, SOLUTION SUBCUTANEOUS WEEKLY
Qty: 3 PEN | Refills: 1 | Status: SHIPPED | OUTPATIENT
Start: 2020-11-03 | End: 2020-11-05 | Stop reason: SDUPTHER

## 2020-11-05 ENCOUNTER — OFFICE VISIT (OUTPATIENT)
Dept: FAMILY MEDICINE CLINIC | Age: 55
End: 2020-11-05
Payer: COMMERCIAL

## 2020-11-05 VITALS
BODY MASS INDEX: 40.48 KG/M2 | WEIGHT: 243 LBS | OXYGEN SATURATION: 99 % | HEIGHT: 65 IN | HEART RATE: 82 BPM | TEMPERATURE: 97.3 F | SYSTOLIC BLOOD PRESSURE: 130 MMHG | DIASTOLIC BLOOD PRESSURE: 72 MMHG

## 2020-11-05 PROCEDURE — 99213 OFFICE O/P EST LOW 20 MIN: CPT | Performed by: NURSE PRACTITIONER

## 2020-11-05 PROCEDURE — 3051F HG A1C>EQUAL 7.0%<8.0%: CPT | Performed by: NURSE PRACTITIONER

## 2020-11-05 RX ORDER — SEMAGLUTIDE 1.34 MG/ML
1 INJECTION, SOLUTION SUBCUTANEOUS WEEKLY
Qty: 6 PEN | Refills: 1 | Status: SHIPPED
Start: 2020-11-05 | End: 2020-11-06 | Stop reason: CLARIF

## 2020-11-05 ASSESSMENT — ENCOUNTER SYMPTOMS
EYES NEGATIVE: 1
RESPIRATORY NEGATIVE: 1
BLURRED VISION: 0
GASTROINTESTINAL NEGATIVE: 1

## 2020-11-05 NOTE — PROGRESS NOTES
Subjective  Chief Complaint   Patient presents with    3 Month Follow-Up     DM       Diabetes   He presents for his follow-up diabetic visit. He has type 2 diabetes mellitus. His disease course has been improving. Associated symptoms include foot paresthesias. Pertinent negatives for diabetes include no blurred vision, no chest pain, no fatigue, no foot ulcerations, no polydipsia, no polyphagia and no polyuria. Symptoms are stable. Diabetic complications include peripheral neuropathy. Risk factors for coronary artery disease include diabetes mellitus, male sex, obesity, dyslipidemia and stress. Current diabetic treatment includes insulin injections. He is compliant with treatment all of the time. He is following a diabetic and low fat/cholesterol diet. His home blood glucose trend is decreasing steadily. His overall blood glucose range is 140-180 mg/dl. An ACE inhibitor/angiotensin II receptor blocker is being taken. Bp has been \"good\"    Patient Active Problem List    Diagnosis Date Noted    Type 2 diabetes mellitus with microalbuminuria ([facility] [address] 09/26/2014     Priority: Low    Encounter for long-term (current) insulin use ([facility] [address] 09/26/2014     Priority: Low    Hyperlipidemia 09/26/2014     Priority: Low    Left leg paresthesias 02/11/2019    Erectile dysfunction associated with type 2 diabetes mellitus ([address] 02/09/2018    Tear of right retina without detachment 02/14/2014    Type 2 diabetes mellitus with peripheral neuropathy ([address] 04/10/2013    Periodic limb movement disorder 03/08/2009    Obstructive sleep apnea 03/08/2009    History of peptic ulcer disease 06/01/1995     Past Medical History:   Diagnosis Date    Diabetes mellitus ([address]      No past surgical history on file.   Family History   Problem Relation Age of Onset    High Blood Pressure Mother     Diabetes Father     Cancer Paternal Uncle     Diabetes Paternal Grandmother     Breast Cancer Neg Hx     Colon Cancer Neg Hx     Heart Attack Neg Hx     High Cholesterol Neg Hx     Stroke Neg Hx      Social History     Socioeconomic History    Marital status: Unknown     Spouse name: None    Number of children: None    Years of education: None    Highest education level: None   Occupational History    None   Social Needs    Financial resource strain: None    Food insecurity     Worry: None     Inability: None    Transportation needs     Medical: None     Non-medical: None   Tobacco Use    Smoking status: Current Every Day Smoker     Packs/day: 1.00     Years: 30.00     Pack years: 30.00     Types: Cigarettes     Start date: 9/25/1989    Smokeless tobacco: Never Used   Substance and Sexual Activity    Alcohol use: No     Alcohol/week: 0.0 standard drinks    Drug use: No    Sexual activity: None   Lifestyle    Physical activity     Days per week: None     Minutes per session: None    Stress: None   Relationships    Social connections     Talks on phone: None     Gets together: None     Attends L[employer] service: None     Active member of club or organization: None     Attends meetings of clubs or organizations: None     Relationship status: None    Intimate partner violence     Fear of current or ex partner: None     Emotionally abused: None     Physically abused: None     Forced sexual activity: None   Other Topics Concern    None   Social History Narrative    None     Current Outpatient Medications on File Prior to Visit   Medication Sig Dispense Refill    aspirin EC 81 MG EC tablet Take 1 tablet by mouth daily 90 tablet 1    pravastatin (PRAVACHOL) 40 MG tablet TAKE ONE TABLET BY MOUTH ONE TIME A DAY AT BEDTIME FOR CHOLESTEROL 90 tablet 3    lisinopril (PRINIVIL;ZESTRIL) 10 MG tablet TAKE 1 TABLET BY MOUTH ONE TIME DAILY 90 tablet 3    omeprazole (PRILOSEC) 20 MG delayed release capsule Take 1 capsule by mouth every morning (before breakfast) 90 capsule 1    empagliflozin (JARDIANCE) 25 MG tablet Take 25 mg by mouth daily 90 tablet 1    Insulin Degludec (TRESIBA FLEXTOUCH) 100 UNIT/ML SOPN 40 units each 3 pen 1    metFORMIN (GLUCOPHAGE) 1000 MG tablet Take 1 tablet by mouth 2 times daily (with meals) 180 tablet 1    tadalafil (CIALIS) 5 MG tablet Take 1 tablet by mouth daily as needed for Erectile Dysfunction 30 tablet 5    Insulin Pen Needle (NOVOFINE PLUS) 32G X 4 MM MISC 1 each by Does not apply route daily 200 each 1    Magnesium 500 MG TABS Take 1 tablet by mouth daily      BEE POLLEN PO Take 1 capsule by mouth 3 times daily       Multiple Vitamins-Minerals (ONE DAILY MULTIVITAMIN ADULT) TABS Take 1 tablet by mouth daily       Blood Glucose Monitoring Suppl (PRODIGY AUTOCODE BLOOD GLUCOSE) w/Device KIT Use to test once daily and as needed as directed by provider 1 kit 0    PRODIGY LANCETS 28G MISC Use to test once daily and as needed as directed by provider 100 each 3    blood glucose test strips (PRODIGY NO CODING BLOOD GLUC) strip Use to test once daily and as needed as directed by provider 100 each 3    Cyanocobalamin (VITAMIN B 12 PO) Take 1,000 mg by mouth daily       No current facility-administered medications on file prior to visit. No Known Allergies    Review of Systems   Constitutional: Negative for fatigue. HENT: Negative. Eyes: Negative. Negative for blurred vision. Respiratory: Negative. Cardiovascular: Negative for chest pain. Gastrointestinal: Negative. Endocrine: Negative for polydipsia, polyphagia and polyuria. Genitourinary: Negative. Skin: Negative. Neurological: Positive for numbness. Psychiatric/Behavioral: Negative. Objective  Vitals:    11/05/20 0759   BP: 130/72   Pulse: 82   Temp: 97.3 °F (36.3 °C)   SpO2: 99%   Weight: 243 lb (110.2 kg)   Height: 5' 5\" (1.651 m)     Physical Exam  Vitals signs and nursing note reviewed. Constitutional:       General: He is not in acute distress. Appearance: Normal appearance. He is obese.    HENT:      Head: Normocephalic and atraumatic. Mouth/Throat:      Mouth: Mucous membranes are moist.   Eyes:      Extraocular Movements: Extraocular movements intact. Conjunctiva/sclera: Conjunctivae normal.      Pupils: Pupils are equal, round, and reactive to light. Cardiovascular:      Rate and Rhythm: Normal rate and regular rhythm. Pulses: Normal pulses. Heart sounds: Normal heart sounds. Pulmonary:      Effort: Pulmonary effort is normal.      Breath sounds: Normal breath sounds. Feet:      Right foot:      Skin integrity: Skin integrity normal.      Toenail Condition: Right toenails are long. Left foot:      Skin integrity: Skin integrity normal.      Toenail Condition: Left toenails are long. Comments: Left great toenail absent  Skin:     General: Skin is warm and dry. Neurological:      General: No focal deficit present. Mental Status: He is alert and oriented to person, place, and time. Sensory: Sensory deficit present. Comments: Sensory deficit noted in bilateral lower extremties   Psychiatric:         Mood and Affect: Mood normal.         Behavior: Behavior normal.         Thought Content: Thought content normal.         Judgment: Judgment normal.         Assessment & Plan     Diagnosis Orders   1. Type 2 diabetes mellitus with microalbuminuria, with long-term current use of insulin ([facility])  Semaglutide,0.25 or 0.5MG/DOS, (OZEMPIC, 0.25 OR 0.5 MG/DOSE,) 2 MG/1.5ML [facility] DIABETES FOOT EXAM   2. Colon cancer screening  Cologuard   3. Hyperlipidemia, unspecified hyperlipidemia type     4. Erectile dysfunction, unspecified erectile dysfunction type            Orders Placed This Encounter   Procedures    Cologuard     This test is performed by an external laboratory and is used for result attachment only. Please fill out the appropriate paperwork required by the processing laboratory. See [email or web address] for further information.      Standing Status:   Future

## 2020-12-12 RX ORDER — EMPAGLIFLOZIN 25 MG/1
25 TABLET, FILM COATED ORAL DAILY
Qty: 90 TABLET | Refills: 1 | Status: SHIPPED | OUTPATIENT
Start: 2020-12-12 | End: 2021-01-27 | Stop reason: SDUPTHER

## 2020-12-12 RX ORDER — LISINOPRIL 10 MG/1
TABLET ORAL
Qty: 90 TABLET | Refills: 3 | Status: SHIPPED | OUTPATIENT
Start: 2020-12-12 | End: 2021-01-27 | Stop reason: SDUPTHER

## 2020-12-12 RX ORDER — PRAVASTATIN SODIUM 40 MG
TABLET ORAL
Qty: 90 TABLET | Refills: 3 | Status: SHIPPED | OUTPATIENT
Start: 2020-12-12 | End: 2021-01-27 | Stop reason: SDUPTHER

## 2020-12-14 RX ORDER — ASPIRIN 81 MG/1
81 TABLET ORAL DAILY
Qty: 90 TABLET | Refills: 1 | Status: SHIPPED | OUTPATIENT
Start: 2020-12-14 | End: 2021-01-27 | Stop reason: SDUPTHER

## 2020-12-29 ENCOUNTER — TELEPHONE (OUTPATIENT)
Dept: PHARMACY | Facility: CLINIC | Age: 55
End: 2020-12-29

## 2020-12-29 NOTE — TELEPHONE ENCOUNTER
Attempting to reach patient to schedule a pharmacist appointment to discuss medications for the 2021 Diabetes Management Program.    No answer. Left VM on home/cell TAD: Please call back at [phone] Option #7 to retrieve the above message.       Fanta Richards

## 2020-12-29 NOTE — TELEPHONE ENCOUNTER
Patient returned call and made an appointment on 1/5/20 at [address] [place] [address] and would like a call back at [phone]

## 2021-01-05 ENCOUNTER — SCHEDULED TELEPHONE ENCOUNTER (OUTPATIENT)
Dept: PHARMACY | Facility: CLINIC | Age: 56
End: 2021-01-05

## 2021-01-05 RX ORDER — BLOOD SUGAR DIAGNOSTIC
STRIP MISCELLANEOUS
Qty: 100 EACH | Refills: 3 | Status: SHIPPED | OUTPATIENT
Start: 2021-01-05

## 2021-01-05 NOTE — TELEPHONE ENCOUNTER
[facility] Diabetes Program - Be Well With Diabetes  =================================================================  Carlita Cole is a 48 y.o. male enrolled in the [address] [place] [address] Diabetes Program. Patient provided Caresse Peat with verbal consent to remain in the program for this year.      Medications:  Medication Sig    aspirin EC 81 MG EC tablet Take 1 tablet by mouth daily    pravastatin (PRAVACHOL) 40 MG tablet TAKE ONE TABLET BY MOUTH ONE TIME A DAY AT BEDTIME FOR CHOLESTEROL    lisinopril (PRINIVIL;ZESTRIL) 10 MG tablet TAKE 1 TABLET BY MOUTH ONE TIME DAILY    empagliflozin (JARDIANCE) 25 MG tablet Take 25 mg by mouth daily    Semaglutide, 1 MG/DOSE, 2 MG/1.5ML SOPN Inject 1 mg into the skin once a week  - recent change (increased dose), no ADRs    Insulin Degludec (TRESIBA FLEXTOUCH) 100 UNIT/ML SOPN 40 units each  - confirms dose, in AM    omeprazole (PRILOSEC) 20 MG delayed release capsule Take 1 capsule by mouth every morning (before breakfast)    metFORMIN (GLUCOPHAGE) 1000 MG tablet Take 1 tablet by mouth 2 times daily (with meals)  - no issues with BID, uses pill box    tadalafil (CIALIS) 5 MG tablet Take 1 tablet by mouth daily as needed for Erectile Dysfunction    Insulin Pen Needle (NOVOFINE PLUS) 32G X 4 MM MISC 1 each by Does not apply route daily    Magnesium 500 MG TABS Take 1 tablet by mouth daily    BEE POLLEN PO Take 1 capsule by mouth 3 times daily   - not currently using, but would like left on for now  - \"lubricating joints, energy\" - endorses benefit with use    Cyanocobalamin (VITAMIN B 12 PO) Take 1,000 mg by mouth daily  - has not been taking, says has B12 deficiency so will resume    Multiple Vitamins-Minerals (ONE DAILY MULTIVITAMIN ADULT) TABS Take 1 tablet by mouth daily     Blood Glucose Monitoring Suppl (PRODIGY AUTOCODE BLOOD GLUCOSE) w/Device KIT Use to test once daily and as needed as directed by provider    PRODIGY LANCETS 28G MISC Use to test once daily and as needed as directed by provider    blood glucose test strips (PRODIGY NO CODING BLOOD GLUC) strip Use to test once daily and as needed as directed by provider      Current Pharmacy: [facility]  Current testing supplies/frequency: no claims, says using Prodigy. Needs test strips refilled - will need new Rx. Prodigy working well for him but prefers One Touch lancets/lancing device. Offered to get Rx but he kindly declines for now - explained should be tier 1 Rx but NOT covered under DM program  Pen needles/syringes: on preferred pen needles; qty 200 = 90 ds; last fill 3/2020 - supply OK for now. Drinking lots of water. Reducing snacking. AMFBGs 160s-180s. Allergies:  No Known Allergies   Vitals/Labs:  BP Readings from Last 3 Encounters:   11/05/20 130/72   08/06/20 136/70   01/07/20 118/84     Component      Latest Ref Rng & Units 12/27/2019 11/9/2018 2/10/2018           8:04 AM 11:26 AM  8:15 AM   Microalbumin, Random Urine      Not Established mg/dL 1.90 9.20 (H) 16.60 (H)   Creatinine, Ur      Not Established mg/dL 17.8 82.5 117.5   Microalbumin Creatinine Ratio      0.0 - 30.0 mg/G 106.7 (H) 111.5 (H) 141.3 (H)     Lab Results   Component Value Date    LABA1C 7.7 (H) 08/06/2020    LABA1C 11.4 01/07/2020    LABA1C 9.1 (H) 09/25/2019     Wt Readings from Last 3 Encounters:   11/05/20 243 lb (110.2 kg)   08/06/20 240 lb (108.9 kg)   04/08/20 240 lb (108.9 kg)      Lab Results   Component Value Date    CHOL 126 08/06/2020    TRIG 250 (H) 08/06/2020    HDL 27 (L) 08/06/2020    LDLCALC 49 08/06/2020    LDLDIRECT 91 08/27/2014     ALT   Date Value Ref Range Status   08/06/2020 15 0 - 41 U/L Final     AST   Date Value Ref Range Status   08/06/2020 14 0 - 40 U/L Final     The ASCVD Risk score (Javad Donaldson, et al., 2013) failed to calculate for the following reasons:     The valid total cholesterol range is 130 to 320 mg/dL     Lab Results   Component Value Date    CREATININE 0.60 (L) 08/06/2020     CrCl cannot be calculated (Patient's most recent lab result is older than the maximum 120 days allowed. ). Component      Latest Ref Rng & Units 8/6/2020 9/25/2019 2/11/2019           9:49 AM 10:43 AM  8:01 AM   GFR Non-      >60 >60.0 >60.0 >60.0     Immunizations:  Immunization History   Administered Date(s) Administered    COVID-19, Moderna, 100mcg/0.5ml 12/14/2020    Fluvirin 4 years and over 10/29/2015    Influenza 10/07/2011, 01/30/2014    Influenza Vaccine, unspecified formulation 10/07/1995, 10/02/1996, 10/02/1998, 10/25/1999, 12/05/2000, 10/23/2002, 02/01/2005, 10/07/2011, 01/30/2014, 10/14/2014, 10/29/2015, 10/26/2017    Influenza Virus Vaccine 10/07/1995, 10/02/1996, 10/02/1998, 10/25/1999, 12/05/2000, 10/23/2002, 02/01/2005, 10/14/2014    Influenza, Quadv, IM, (6 mo and older Fluzone, Flulaval, Fluarix and 3 yrs and older Afluria) 10/02/2018, 09/25/2019, 10/16/2020    Pneumococcal Polysaccharide ([id number]) 01/30/2014, 10/26/2017    Tdap (Boostrix, Adacel) 02/11/2016      Social History:  Social History     Tobacco Use    Smoking status: Current Every Day Smoker     Packs/day: 1.00     Years: 30.00     Pack years: 30.00     Types: Cigarettes     Start date: 9/25/1989    Smokeless tobacco: Never Used   Substance Use Topics    Alcohol use: No     Alcohol/week: 0.0 standard drinks     ASSESSMENT:  Initial Program Requirements (Y indicates has completed for the year, N indicates needs to be completed by 7/1/2021): No - OV with provider for DM (1st)  No - A1c (1st)     Ongoing Program Requirements (Y indicates has completed for the year, N indicates needs to be completed by 12/31/2021):   No - OV with provider for DM (2nd)  Yes - ACC/diabetes educator visit (will need if A1c becomes over 8%)  No - A1c (2nd)  No - Lipid panel  No - Urine microalbumin  Yes - Pneumococcal vaccination: UTD  No - Influenza vaccination for Fall 2021  Yes - Medication adherence over 70% - n/a on insulin  Yes - On statin or contraindication(s) pravastatin  Yes - On ACEi/ARB or contraindication(s) lisinopril     Formulary Medication Review:  Non-formulary or medications with cost-effective alternatives: none identified. Prefers One Touch lancing device/lancets. Current medications eligible for copay waiver, up to $600, through [address] [employer]:  - aspirin (with prescription), Liliana Ethan, Tresiba, lisinopril, metformin, pravastatin, Ozempic  - Generic ([facility] pharmacy-stocked) insulin syringes and pen needles  - Agamatrix or Prodigy meter and supplies     Diabetes Care:   - Glycemic Goal: <7.0%. Is not at blood glucose goal but noted improvement from previous result. Current regimen metformin 1000 mg BID, Jardiance 25 mg daily, Ozempic 1 mg once weekly, Tresiba 40 units daily. Ozempic dose recently increased by provider. Future consideration for to change to [facility] if medication adherence an issue; also potentially could titrate basal insulin further if needed. Other Considerations:  - Blood Pressure Goal: BP less than 140/90 mmHg due to history of DM: Is at blood pressure goal. Current regimen lisinopril 10 mg daily. UACR elevated but eGFR WNL. - Lipids: LDL less than 70 mg/dL on current statin regimen (pravastatin 40 mg daily - moderate intensity statin).   - Smoking status: current smoker per chart (did not discuss during call)    PLAN:  - Consideration(s) for provider: see refill encounter to NATALEE Pinon - CNP   · Prodigy test strip refills - once daily  - DM program gaps identified:   · Initial requirements: OV with provider for DM (1st) and A1c (1st)   · Ongoing requirements: OV with provider for DM (2nd), ACC/diabetes educator visit (if A1c over 8%), A1c (2nd), Lipid panel, Urine microalbumin and Influenza vaccination for [id number]   - Education to patient: as above and:   · Encouraged to restart vitamin B12 supplement to see if helps with neuropathy, especially since on chronic metformin therapy. Also encouraged optimal BG control. · He is also a current smoker per chart, recommend cessation.  Will send information for Breathe Easy Program  · One Touch lancets tier 1 if desired  - Follow up: PCP for identified gaps or as scheduled below  - Upcoming appointments:   Future Appointments   Date Time Provider Monster Sultana   1/5/2021  9:00 AM SCHEDULE, S [facility] Rx None   3/4/2021  7:30 AM NATALEE Lagunas - CNP [facility] [place], PharmD,  [facility] Pharmacist  Dept: [phone] (toll free [phone], option 7)

## 2021-01-06 NOTE — TELEPHONE ENCOUNTER
Noted Rx signed by provider - thank you! [employer] message sent to patient. For Pharmacy Admin Tracking Only    PHSO: Yes  Total # of Interventions Recommended: 2  - Refills Provided #: 1  - Updated Order #: 1 Updated Order Reason(s):  Other  Recommended intervention potential cost savings: 1  Total Interventions Accepted: 2  Time Spent (min): Juan C Vazquez PharmD  [address]

## 2021-01-27 DIAGNOSIS — R80.9 TYPE 2 DIABETES MELLITUS WITH MICROALBUMINURIA, WITH LONG-TERM CURRENT USE OF INSULIN (HCC): Chronic | ICD-10-CM

## 2021-01-27 DIAGNOSIS — E11.42 TYPE 2 DIABETES MELLITUS WITH PERIPHERAL NEUROPATHY (HCC): Chronic | ICD-10-CM

## 2021-01-27 DIAGNOSIS — E11.29 TYPE 2 DIABETES MELLITUS WITH MICROALBUMINURIA, WITH LONG-TERM CURRENT USE OF INSULIN (HCC): Primary | Chronic | ICD-10-CM

## 2021-01-27 DIAGNOSIS — Z79.4 TYPE 2 DIABETES MELLITUS WITH MICROALBUMINURIA, WITH LONG-TERM CURRENT USE OF INSULIN (HCC): Chronic | ICD-10-CM

## 2021-01-27 DIAGNOSIS — Z79.4 TYPE 2 DIABETES MELLITUS WITH MICROALBUMINURIA, WITH LONG-TERM CURRENT USE OF INSULIN (HCC): Primary | Chronic | ICD-10-CM

## 2021-01-27 DIAGNOSIS — R80.9 TYPE 2 DIABETES MELLITUS WITH MICROALBUMINURIA, WITH LONG-TERM CURRENT USE OF INSULIN (HCC): Primary | Chronic | ICD-10-CM

## 2021-01-27 DIAGNOSIS — E11.29 TYPE 2 DIABETES MELLITUS WITH MICROALBUMINURIA, WITH LONG-TERM CURRENT USE OF INSULIN (HCC): Chronic | ICD-10-CM

## 2021-01-27 RX ORDER — EMPAGLIFLOZIN 25 MG/1
25 TABLET, FILM COATED ORAL DAILY
Qty: 90 TABLET | Refills: 1 | Status: SHIPPED | OUTPATIENT
Start: 2021-01-27 | End: 2021-07-26 | Stop reason: SDUPTHER

## 2021-01-27 RX ORDER — LISINOPRIL 10 MG/1
TABLET ORAL
Qty: 90 TABLET | Refills: 1 | Status: SHIPPED | OUTPATIENT
Start: 2021-01-27 | End: 2021-07-26 | Stop reason: SDUPTHER

## 2021-01-27 RX ORDER — ASPIRIN 81 MG/1
81 TABLET ORAL DAILY
Qty: 90 TABLET | Refills: 1 | Status: SHIPPED | OUTPATIENT
Start: 2021-01-27 | End: 2021-07-26 | Stop reason: SDUPTHER

## 2021-01-27 RX ORDER — PRAVASTATIN SODIUM 40 MG
TABLET ORAL
Qty: 90 TABLET | Refills: 1 | Status: SHIPPED | OUTPATIENT
Start: 2021-01-27 | End: 2021-07-26 | Stop reason: SDUPTHER

## 2021-03-15 ENCOUNTER — OFFICE VISIT (OUTPATIENT)
Dept: FAMILY MEDICINE CLINIC | Age: 56
End: 2021-03-15
Payer: COMMERCIAL

## 2021-03-15 VITALS
WEIGHT: 236.4 LBS | SYSTOLIC BLOOD PRESSURE: 122 MMHG | TEMPERATURE: 98.2 F | HEART RATE: 101 BPM | OXYGEN SATURATION: 98 % | HEIGHT: 65 IN | BODY MASS INDEX: 39.39 KG/M2 | DIASTOLIC BLOOD PRESSURE: 70 MMHG

## 2021-03-15 DIAGNOSIS — E11.42 TYPE 2 DIABETES MELLITUS WITH PERIPHERAL NEUROPATHY (HCC): Primary | Chronic | ICD-10-CM

## 2021-03-15 DIAGNOSIS — Z79.4 TYPE 2 DIABETES MELLITUS WITH MICROALBUMINURIA, WITH LONG-TERM CURRENT USE OF INSULIN (HCC): Chronic | ICD-10-CM

## 2021-03-15 DIAGNOSIS — E11.29 TYPE 2 DIABETES MELLITUS WITH MICROALBUMINURIA, WITH LONG-TERM CURRENT USE OF INSULIN (HCC): Chronic | ICD-10-CM

## 2021-03-15 DIAGNOSIS — R80.9 TYPE 2 DIABETES MELLITUS WITH MICROALBUMINURIA, WITH LONG-TERM CURRENT USE OF INSULIN (HCC): Chronic | ICD-10-CM

## 2021-03-15 DIAGNOSIS — E78.5 HYPERLIPIDEMIA, UNSPECIFIED HYPERLIPIDEMIA TYPE: Chronic | ICD-10-CM

## 2021-03-15 LAB — HBA1C MFR BLD: 7.3 %

## 2021-03-15 PROCEDURE — 83036 HEMOGLOBIN GLYCOSYLATED A1C: CPT | Performed by: NURSE PRACTITIONER

## 2021-03-15 PROCEDURE — 3051F HG A1C>EQUAL 7.0%<8.0%: CPT | Performed by: NURSE PRACTITIONER

## 2021-03-15 PROCEDURE — 99213 OFFICE O/P EST LOW 20 MIN: CPT | Performed by: NURSE PRACTITIONER

## 2021-03-15 RX ORDER — INSULIN DEGLUDEC INJECTION 100 U/ML
INJECTION, SOLUTION SUBCUTANEOUS
Qty: 3 PEN | Refills: 1 | Status: SHIPPED | OUTPATIENT
Start: 2021-03-15 | End: 2021-05-13 | Stop reason: SDUPTHER

## 2021-03-15 SDOH — ECONOMIC STABILITY: TRANSPORTATION INSECURITY
IN THE PAST 12 MONTHS, HAS THE LACK OF TRANSPORTATION KEPT YOU FROM MEDICAL APPOINTMENTS OR FROM GETTING MEDICATIONS?: NO

## 2021-03-15 SDOH — ECONOMIC STABILITY: FOOD INSECURITY: WITHIN THE PAST 12 MONTHS, THE FOOD YOU BOUGHT JUST DIDN'T LAST AND YOU DIDN'T HAVE MONEY TO GET MORE.: NEVER TRUE

## 2021-03-15 SDOH — ECONOMIC STABILITY: FOOD INSECURITY: WITHIN THE PAST 12 MONTHS, YOU WORRIED THAT YOUR FOOD WOULD RUN OUT BEFORE YOU GOT MONEY TO BUY MORE.: NEVER TRUE

## 2021-03-15 SDOH — ECONOMIC STABILITY: INCOME INSECURITY: HOW HARD IS IT FOR YOU TO PAY FOR THE VERY BASICS LIKE FOOD, HOUSING, MEDICAL CARE, AND HEATING?: NOT HARD AT ALL

## 2021-03-15 ASSESSMENT — ENCOUNTER SYMPTOMS
COUGH: 0
SHORTNESS OF BREATH: 0

## 2021-03-15 ASSESSMENT — PATIENT HEALTH QUESTIONNAIRE - PHQ9
SUM OF ALL RESPONSES TO PHQ QUESTIONS 1-9: 0

## 2021-03-15 NOTE — PROGRESS NOTES
Subjective  Chief Complaint   Patient presents with    Diabetes     4 month follow up.  Health Maintenance     has [employer] kit. Diabetes  He presents for his follow-up diabetic visit. He has type 2 diabetes mellitus. There are no hypoglycemic associated symptoms. Associated symptoms include foot paresthesias. Pertinent negatives for diabetes include no chest pain and no fatigue. Symptoms are stable. Diabetic complications include peripheral neuropathy. Risk factors for coronary artery disease include obesity, dyslipidemia, diabetes mellitus and hypertension. Current diabetic treatment includes intensive insulin program. He is compliant with treatment most of the time. His weight is stable. When asked about meal planning, he reported none. His home blood glucose trend is fluctuating minimally. An ACE inhibitor/angiotensin II receptor blocker is being taken. Pt states that he was recently on vacation for a couple of weeks. Knows that BS was higher than. Otherwise, pt feeling well. Due for an eye exam.       Patient Active Problem List    Diagnosis Date Noted    Type 2 diabetes mellitus with microalbuminuria ([address] 09/26/2014     Priority: Low    Encounter for long-term (current) insulin use ([address] 09/26/2014     Priority: Low    Hyperlipidemia 09/26/2014     Priority: Low    Left leg paresthesias 02/11/2019    Erectile dysfunction associated with type 2 diabetes mellitus ([address] 02/09/2018    Tear of right retina without detachment 02/14/2014    Type 2 diabetes mellitus with peripheral neuropathy ([address] 04/10/2013    Periodic limb movement disorder 03/08/2009    Obstructive sleep apnea 03/08/2009    History of peptic ulcer disease 06/01/1995     Past Medical History:   Diagnosis Date    Diabetes mellitus ([address]      No past surgical history on file.   Family History   Problem Relation Age of Onset    High Blood Pressure Mother     Diabetes Father     Cancer Paternal Clemente Estrada Diabetes Paternal Grandmother     Breast Cancer Neg Hx     Colon Cancer Neg Hx     Heart Attack Neg Hx     High Cholesterol Neg Hx     Stroke Neg Hx      Social History     Socioeconomic History    Marital status: Unknown     Spouse name: None    Number of children: None    Years of education: None    Highest education level: None   Occupational History    None   Social Needs    Financial resource strain: Not hard at all   Eure-Griffin insecurity     Worry: Never true     Inability: Never true    Transportation needs     Medical: No     Non-medical: No   Tobacco Use    Smoking status: Current Every Day Smoker     Packs/day: 1.00     Years: 30.00     Pack years: 30.00     Types: Cigarettes     Start date: 9/25/1989    Smokeless tobacco: Never Used   Substance and Sexual Activity    Alcohol use: No     Alcohol/week: 0.0 standard drinks    Drug use: No    Sexual activity: None   Lifestyle    Physical activity     Days per week: None     Minutes per session: None    Stress: None   Relationships    Social connections     Talks on phone: None     Gets together: None     Attends Faith service: None     Active member of club or organization: None     Attends meetings of clubs or organizations: None     Relationship status: None    Intimate partner violence     Fear of current or ex partner: None     Emotionally abused: None     Physically abused: None     Forced sexual activity: None   Other Topics Concern    None   Social History Narrative    None     Current Outpatient Medications on File Prior to Visit   Medication Sig Dispense Refill    aspirin EC 81 MG EC tablet Take 1 tablet by mouth daily 90 tablet 1    metFORMIN (GLUCOPHAGE) 1000 MG tablet Take 1 tablet by mouth 2 times daily (with meals) 180 tablet 1    pravastatin (PRAVACHOL) 40 MG tablet TAKE ONE TABLET BY MOUTH ONE TIME A DAY AT BEDTIME FOR CHOLESTEROL 90 tablet 1    lisinopril (PRINIVIL;ZESTRIL) 10 MG tablet TAKE 1 TABLET BY MOUTH ONE TIME DAILY 90 tablet 1    empagliflozin (JARDIANCE) 25 MG tablet Take 25 mg by mouth daily 90 tablet 1    Semaglutide, 1 MG/DOSE, 2 MG/1.5ML SOPN Inject 1 mg into the skin once a week 6 pen 1    blood glucose test strips (PRODIGY NO CODING BLOOD GLUC) strip Use to test once daily and as needed as directed by provider 100 each 3    omeprazole (PRILOSEC) 20 MG delayed release capsule Take 1 capsule by mouth every morning (before breakfast) 90 capsule 1    tadalafil (CIALIS) 5 MG tablet Take 1 tablet by mouth daily as needed for Erectile Dysfunction 30 tablet 5    Insulin Pen Needle (NOVOFINE PLUS) 32G X 4 MM MISC 1 each by Does not apply route daily 200 each 1    Magnesium 500 MG TABS Take 1 tablet by mouth daily      BEE POLLEN PO Take 1 capsule by mouth 3 times daily       Cyanocobalamin (VITAMIN B 12 PO) Take 1,000 mg by mouth daily      Multiple Vitamins-Minerals (ONE DAILY MULTIVITAMIN ADULT) TABS Take 1 tablet by mouth daily       Blood Glucose Monitoring Suppl (PRODIGY AUTOCODE BLOOD GLUCOSE) w/Device KIT Use to test once daily and as needed as directed by provider 1 kit 0    PRODIGY LANCETS 28G MISC Use to test once daily and as needed as directed by provider 100 each 3     No current facility-administered medications on file prior to visit. No Known Allergies    Review of Systems   Constitutional: Negative for fatigue. Respiratory: Negative for cough and shortness of breath. Cardiovascular: Negative for chest pain. Objective  Vitals:    03/15/21 1516   BP: 122/70   Site: Left Upper Arm   Position: Sitting   Cuff Size: Large Adult   Pulse: 101   Temp: 98.2 °F (36.8 °C)   SpO2: 98%   Weight: 236 lb 6.4 oz (107.2 kg)   Height: 5' 5\" (1.651 m)     Physical Exam  Vitals signs and nursing note reviewed. Constitutional:       Appearance: Normal appearance. He is obese. HENT:      Head: Normocephalic.       Mouth/Throat:      Mouth: Mucous membranes are moist.   Eyes:      Pupils: Pupils are equal, round, and reactive to light. Cardiovascular:      Rate and Rhythm: Normal rate and regular rhythm. Pulses: Normal pulses. Heart sounds: Normal heart sounds. Pulmonary:      Effort: Pulmonary effort is normal.      Breath sounds: Normal breath sounds. Skin:     General: Skin is warm. Neurological:      General: No focal deficit present. Mental Status: He is alert and oriented to person, place, and time. Mental status is at baseline. Psychiatric:         Mood and Affect: Mood normal.         Behavior: Behavior normal.         Thought Content: Thought content normal.         Judgment: Judgment normal.       Assessment & Plan     Diagnosis Orders   1. Type 2 diabetes mellitus with peripheral neuropathy (HCC)     2. Type 2 diabetes mellitus with microalbuminuria, with long-term current use of insulin (HCC)  Insulin Degludec (TRESIBA FLEXTOUCH) 100 UNIT/ML SOPN    POCT glycosylated hemoglobin (Hb A1C)   3. Hyperlipidemia, unspecified hyperlipidemia type         Orders Placed This Encounter   Procedures    POCT glycosylated hemoglobin (Hb A1C)       Orders Placed This Encounter   Medications    Insulin Degludec (TRESIBA FLEXTOUCH) 100 UNIT/ML SOPN     Si units each     Dispense:  3 pen     Refill:  1       Side effects, adverse effects of the medication prescribed today, as well as treatment plan/ rationale and result expectations have been discussed with the patient who expresses understanding and desires to proceed. Close follow up to evaluate treatment results and for coordination of care. I have reviewed the patient's medical history in detail and updated the computerized patient record. As always, patient is advised that if symptoms worsen in any way they will proceed to the nearest emergency room. Fu in 6 mos.      Meche Hall, NATALEE - SHERRILL

## 2021-05-13 DIAGNOSIS — E11.29 TYPE 2 DIABETES MELLITUS WITH MICROALBUMINURIA, WITH LONG-TERM CURRENT USE OF INSULIN (HCC): Chronic | ICD-10-CM

## 2021-05-13 DIAGNOSIS — Z79.4 TYPE 2 DIABETES MELLITUS WITH MICROALBUMINURIA, WITH LONG-TERM CURRENT USE OF INSULIN (HCC): Chronic | ICD-10-CM

## 2021-05-13 DIAGNOSIS — R80.9 TYPE 2 DIABETES MELLITUS WITH MICROALBUMINURIA, WITH LONG-TERM CURRENT USE OF INSULIN (HCC): Chronic | ICD-10-CM

## 2021-05-14 RX ORDER — INSULIN DEGLUDEC INJECTION 100 U/ML
INJECTION, SOLUTION SUBCUTANEOUS
Qty: 3 PEN | Refills: 1 | Status: SHIPPED | OUTPATIENT
Start: 2021-05-14 | End: 2021-11-21 | Stop reason: SDUPTHER

## 2021-05-15 DIAGNOSIS — N40.0 BENIGN PROSTATIC HYPERPLASIA WITHOUT LOWER URINARY TRACT SYMPTOMS: ICD-10-CM

## 2021-05-15 DIAGNOSIS — N52.9 ERECTILE DYSFUNCTION, UNSPECIFIED ERECTILE DYSFUNCTION TYPE: ICD-10-CM

## 2021-05-17 RX ORDER — TADALAFIL 5 MG/1
5 TABLET ORAL DAILY PRN
Qty: 30 TABLET | Refills: 5 | Status: SHIPPED | OUTPATIENT
Start: 2021-05-17 | End: 2021-07-26 | Stop reason: SDUPTHER

## 2021-05-20 DIAGNOSIS — E11.29 TYPE 2 DIABETES MELLITUS WITH MICROALBUMINURIA, WITH LONG-TERM CURRENT USE OF INSULIN (HCC): Chronic | ICD-10-CM

## 2021-05-20 DIAGNOSIS — Z79.4 TYPE 2 DIABETES MELLITUS WITH MICROALBUMINURIA, WITH LONG-TERM CURRENT USE OF INSULIN (HCC): Chronic | ICD-10-CM

## 2021-05-20 DIAGNOSIS — R80.9 TYPE 2 DIABETES MELLITUS WITH MICROALBUMINURIA, WITH LONG-TERM CURRENT USE OF INSULIN (HCC): Chronic | ICD-10-CM

## 2021-07-26 DIAGNOSIS — R80.9 TYPE 2 DIABETES MELLITUS WITH MICROALBUMINURIA, WITH LONG-TERM CURRENT USE OF INSULIN (HCC): Chronic | ICD-10-CM

## 2021-07-26 DIAGNOSIS — N40.0 BENIGN PROSTATIC HYPERPLASIA WITHOUT LOWER URINARY TRACT SYMPTOMS: ICD-10-CM

## 2021-07-26 DIAGNOSIS — E11.42 TYPE 2 DIABETES MELLITUS WITH PERIPHERAL NEUROPATHY (HCC): Chronic | ICD-10-CM

## 2021-07-26 DIAGNOSIS — E11.29 TYPE 2 DIABETES MELLITUS WITH MICROALBUMINURIA, WITH LONG-TERM CURRENT USE OF INSULIN (HCC): Chronic | ICD-10-CM

## 2021-07-26 DIAGNOSIS — N52.9 ERECTILE DYSFUNCTION, UNSPECIFIED ERECTILE DYSFUNCTION TYPE: ICD-10-CM

## 2021-07-26 DIAGNOSIS — Z79.4 TYPE 2 DIABETES MELLITUS WITH MICROALBUMINURIA, WITH LONG-TERM CURRENT USE OF INSULIN (HCC): Chronic | ICD-10-CM

## 2021-07-27 DIAGNOSIS — E11.29 TYPE 2 DIABETES MELLITUS WITH MICROALBUMINURIA, WITH LONG-TERM CURRENT USE OF INSULIN (HCC): Chronic | ICD-10-CM

## 2021-07-27 DIAGNOSIS — Z79.4 TYPE 2 DIABETES MELLITUS WITH MICROALBUMINURIA, WITH LONG-TERM CURRENT USE OF INSULIN (HCC): Chronic | ICD-10-CM

## 2021-07-27 DIAGNOSIS — R80.9 TYPE 2 DIABETES MELLITUS WITH MICROALBUMINURIA, WITH LONG-TERM CURRENT USE OF INSULIN (HCC): Chronic | ICD-10-CM

## 2021-07-27 RX ORDER — ASPIRIN 81 MG/1
81 TABLET ORAL DAILY
Qty: 90 TABLET | Refills: 1 | Status: SHIPPED | OUTPATIENT
Start: 2021-07-27 | End: 2022-05-02 | Stop reason: SDUPTHER

## 2021-07-27 RX ORDER — OMEPRAZOLE 20 MG/1
20 CAPSULE, DELAYED RELEASE ORAL
Qty: 90 CAPSULE | Refills: 0 | Status: SHIPPED | OUTPATIENT
Start: 2021-07-27 | End: 2021-11-21 | Stop reason: SDUPTHER

## 2021-07-27 RX ORDER — TADALAFIL 5 MG/1
5 TABLET ORAL DAILY PRN
Qty: 30 TABLET | Refills: 0 | Status: SHIPPED | OUTPATIENT
Start: 2021-07-27 | End: 2021-09-28 | Stop reason: SDUPTHER

## 2021-07-27 RX ORDER — EMPAGLIFLOZIN 25 MG/1
25 TABLET, FILM COATED ORAL DAILY
Qty: 90 TABLET | Refills: 0 | Status: SHIPPED | OUTPATIENT
Start: 2021-07-27 | End: 2021-11-21 | Stop reason: SDUPTHER

## 2021-07-27 RX ORDER — LISINOPRIL 10 MG/1
TABLET ORAL
Qty: 90 TABLET | Refills: 0 | Status: SHIPPED | OUTPATIENT
Start: 2021-07-27 | End: 2021-11-21 | Stop reason: SDUPTHER

## 2021-07-27 RX ORDER — PRAVASTATIN SODIUM 40 MG
TABLET ORAL
Qty: 90 TABLET | Refills: 0 | Status: SHIPPED | OUTPATIENT
Start: 2021-07-27 | End: 2021-11-21 | Stop reason: SDUPTHER

## 2021-09-28 ENCOUNTER — OFFICE VISIT (OUTPATIENT)
Dept: FAMILY MEDICINE CLINIC | Age: 56
End: 2021-09-28
Payer: COMMERCIAL

## 2021-09-28 VITALS
SYSTOLIC BLOOD PRESSURE: 130 MMHG | WEIGHT: 229 LBS | OXYGEN SATURATION: 96 % | HEIGHT: 65 IN | BODY MASS INDEX: 38.15 KG/M2 | HEART RATE: 94 BPM | DIASTOLIC BLOOD PRESSURE: 72 MMHG

## 2021-09-28 DIAGNOSIS — Z23 NEED FOR INFLUENZA VACCINATION: ICD-10-CM

## 2021-09-28 DIAGNOSIS — Z87.891 PERSONAL HISTORY OF TOBACCO USE: ICD-10-CM

## 2021-09-28 DIAGNOSIS — Z79.4 TYPE 2 DIABETES MELLITUS WITH MICROALBUMINURIA, WITH LONG-TERM CURRENT USE OF INSULIN (HCC): Primary | ICD-10-CM

## 2021-09-28 DIAGNOSIS — Z12.11 COLON CANCER SCREENING: ICD-10-CM

## 2021-09-28 DIAGNOSIS — Z13.220 LIPID SCREENING: ICD-10-CM

## 2021-09-28 DIAGNOSIS — N52.9 ERECTILE DYSFUNCTION, UNSPECIFIED ERECTILE DYSFUNCTION TYPE: ICD-10-CM

## 2021-09-28 DIAGNOSIS — N40.0 BENIGN PROSTATIC HYPERPLASIA WITHOUT LOWER URINARY TRACT SYMPTOMS: ICD-10-CM

## 2021-09-28 DIAGNOSIS — E11.29 TYPE 2 DIABETES MELLITUS WITH MICROALBUMINURIA, WITH LONG-TERM CURRENT USE OF INSULIN (HCC): Primary | ICD-10-CM

## 2021-09-28 DIAGNOSIS — R80.9 TYPE 2 DIABETES MELLITUS WITH MICROALBUMINURIA, WITH LONG-TERM CURRENT USE OF INSULIN (HCC): Primary | ICD-10-CM

## 2021-09-28 PROCEDURE — 90471 IMMUNIZATION ADMIN: CPT | Performed by: NURSE PRACTITIONER

## 2021-09-28 PROCEDURE — 90674 CCIIV4 VAC NO PRSV 0.5 ML IM: CPT | Performed by: NURSE PRACTITIONER

## 2021-09-28 PROCEDURE — 3051F HG A1C>EQUAL 7.0%<8.0%: CPT | Performed by: NURSE PRACTITIONER

## 2021-09-28 PROCEDURE — 99214 OFFICE O/P EST MOD 30 MIN: CPT | Performed by: NURSE PRACTITIONER

## 2021-09-28 PROCEDURE — G0296 VISIT TO DETERM LDCT ELIG: HCPCS | Performed by: NURSE PRACTITIONER

## 2021-09-28 RX ORDER — TADALAFIL 5 MG/1
5 TABLET ORAL DAILY PRN
Qty: 90 TABLET | Refills: 1 | Status: SHIPPED | OUTPATIENT
Start: 2021-09-28 | End: 2021-09-28

## 2021-09-28 ASSESSMENT — ENCOUNTER SYMPTOMS
SHORTNESS OF BREATH: 0
COUGH: 0
CONSTIPATION: 0
DIARRHEA: 0

## 2021-09-28 NOTE — PROGRESS NOTES
Vaccine Information Sheet, \"Influenza - Inactivated\"  given to Lisa Hernandez, or parent/legal guardian of  Lisa Hernandez and verbalized understanding. Patient responses:    Have you ever had a reaction to a flu vaccine? No  Are you able to eat eggs without adverse effects? Yes  Do you have any current illness? No  Have you ever had Guillian [place] Syndrome? No    Flu vaccine given per order. Please see immunization tab.

## 2021-09-28 NOTE — PROGRESS NOTES
Subjective  Chief Complaint   Patient presents with   [address] [facility] with CT, COLOGUARD        Diabetes  He presents for his follow-up diabetic visit. He has type 2 diabetes mellitus. His disease course has been stable. There are no hypoglycemic associated symptoms. Associated symptoms include foot paresthesias. Pertinent negatives for diabetes include no chest pain and no fatigue. There are no hypoglycemic complications. Symptoms are stable. Diabetic complications include peripheral neuropathy. Risk factors for coronary artery disease include obesity, male sex, dyslipidemia, sedentary lifestyle and tobacco exposure. Current diabetic treatment includes oral agent (triple therapy). He is compliant with treatment all of the time. His weight is decreasing steadily. When asked about meal planning, he reported none. He rarely participates in exercise. There is no change in his home blood glucose trend. An ACE inhibitor/angiotensin II receptor blocker is being taken. Has lost 30 lbs in past year. Blood sugar has been stable that he is aware of. Taking all medications without issues. BP has been stable. Patient Active Problem List    Diagnosis Date Noted    Type 2 diabetes mellitus with microalbuminuria ([address] 09/26/2014    Encounter for long-term (current) insulin use ([address] 09/26/2014    Hyperlipidemia 09/26/2014    Left leg paresthesias 02/11/2019    Erectile dysfunction associated with type 2 diabetes mellitus ([address] 02/09/2018    Tear of right retina without detachment 02/14/2014    Type 2 diabetes mellitus with peripheral neuropathy ([address] 04/10/2013    Periodic limb movement disorder 03/08/2009    Obstructive sleep apnea 03/08/2009    History of peptic ulcer disease 06/01/1995     Past Medical History:   Diagnosis Date    Diabetes mellitus ([address]      No past surgical history on file.   Family History   Problem Relation Age of Onset    High Blood Pressure Mother     Diabetes Father     Cancer Paternal Uncle     Diabetes Paternal Grandmother     Breast Cancer Neg Hx     Colon Cancer Neg Hx     Heart Attack Neg Hx     High Cholesterol Neg Hx     Stroke Neg Hx      Social History     Socioeconomic History    Marital status: Single     Spouse name: None    Number of children: None    Years of education: None    Highest education level: None   Occupational History    None   Tobacco Use    Smoking status: Current Every Day Smoker     Packs/day: 1.00     Years: 30.00     Pack years: 30.00     Types: Cigarettes     Start date: 9/25/1989    Smokeless tobacco: Never Used   Substance and Sexual Activity    Alcohol use: No     Alcohol/week: 0.0 standard drinks    Drug use: No    Sexual activity: None   Other Topics Concern    None   Social History Narrative    None     Social Determinants of Health     Financial Resource Strain: Low Risk     Difficulty of Paying Living Expenses: Not hard at all   Food Insecurity: No Food Insecurity    Worried About Running Out of Food in the Last Year: Never true    Donnie of Food in the Last Year: Never true   Transportation Needs: No Transportation Needs    Lack of Transportation (Medical): No    Lack of Transportation (Non-Medical):  No   Physical Activity:     Days of Exercise per Week:     Minutes of Exercise per Session:    Stress:     Feeling of Stress :    Social Connections:     Frequency of Communication with Friends and Family:     Frequency of Social Gatherings with Friends and Family:     Attends Taoist Services:     Active Member of Clubs or Organizations:     Attends Club or Organization Meetings:     Marital Status:    Intimate Partner Violence:     Fear of Current or Ex-Partner:     Emotionally Abused:     Physically Abused:     Sexually Abused:      Current Outpatient Medications on File Prior to Visit   Medication Sig Dispense Refill    aspirin EC 81 MG EC tablet Take 1 tablet by mouth daily 90 tablet 1    omeprazole (PRILOSEC) 20 MG delayed release capsule Take 1 capsule by mouth every morning (before breakfast) 90 capsule 0    metFORMIN (GLUCOPHAGE) 1000 MG tablet Take 1 tablet by mouth 2 times daily (with meals) 180 tablet 0    pravastatin (PRAVACHOL) 40 MG tablet TAKE ONE TABLET BY MOUTH ONE TIME A DAY AT BEDTIME FOR CHOLESTEROL 90 tablet 0    lisinopril (PRINIVIL;ZESTRIL) 10 MG tablet TAKE 1 TABLET BY MOUTH ONE TIME DAILY 90 tablet 0    empagliflozin (JARDIANCE) 25 MG tablet Take 25 mg by mouth daily 90 tablet 0    tadalafil (CIALIS) 5 MG tablet Take 1 tablet by mouth daily as needed for Erectile Dysfunction 30 tablet 0    Semaglutide, 1 MG/DOSE, 2 MG/1.5ML SOPN Inject 1 mg into the skin once a week 6 pen 0    Insulin Pen Needle (NOVOFINE PLUS) 32G X 4 MM MISC 1 each by Does not apply route daily 200 each 1    Insulin Degludec (TRESIBA FLEXTOUCH) 100 UNIT/ML SOPN 40 units each 3 pen 1    blood glucose test strips (PRODIGY NO CODING BLOOD GLUC) strip Use to test once daily and as needed as directed by provider 100 each 3    Magnesium 500 MG TABS Take 1 tablet by mouth daily      BEE POLLEN PO Take 1 capsule by mouth 3 times daily       Cyanocobalamin (VITAMIN B 12 PO) Take 1,000 mg by mouth daily      Multiple Vitamins-Minerals (ONE DAILY MULTIVITAMIN ADULT) TABS Take 1 tablet by mouth daily       Blood Glucose Monitoring Suppl (PRODIGY AUTOCODE BLOOD GLUCOSE) w/Device KIT Use to test once daily and as needed as directed by provider 1 kit 0    PRODIGY LANCETS 28G MISC Use to test once daily and as needed as directed by provider 100 each 3     No current facility-administered medications on file prior to visit. No Known Allergies    Review of Systems   Constitutional: Negative for fatigue. Respiratory: Negative for cough and shortness of breath. Cardiovascular: Negative for chest pain. Gastrointestinal: Negative for constipation and diarrhea.    Neurological: Positive for numbness. Objective  Vitals:    09/28/21 1359   Pulse: 94   SpO2: 96%   Weight: 229 lb (103.9 kg)   Height: 5' 5\" (1.651 m)     Physical Exam  Vitals and nursing note reviewed. Constitutional:       Appearance: Normal appearance. He is obese. HENT:      Head: Normocephalic. Nose: Nose normal.      Mouth/Throat:      Mouth: Mucous membranes are moist.      Pharynx: Oropharynx is clear. Eyes:      Extraocular Movements: Extraocular movements intact. Conjunctiva/sclera: Conjunctivae normal.      Pupils: Pupils are equal, round, and reactive to light. Cardiovascular:      Rate and Rhythm: Normal rate and regular rhythm. Pulses: Normal pulses. Heart sounds: Normal heart sounds. Pulmonary:      Effort: Pulmonary effort is normal.      Breath sounds: Normal breath sounds. Skin:     General: Skin is warm. Neurological:      General: No focal deficit present. Mental Status: He is alert and oriented to person, place, and time. Mental status is at baseline. Psychiatric:         Mood and Affect: Mood normal.         Behavior: Behavior normal.         Thought Content: Thought content normal.         Judgment: Judgment normal.       Assessment & Plan     Diagnosis Orders   1. Personal history of tobacco use  CT lung screen [Initial/Annual]    OK VISIT TO DISCUSS LUNG CA SCREEN W LDCT   2. Colon cancer screening  Cologuard   3. Lipid screening  Lipid Panel   4. Type 2 diabetes mellitus with microalbuminuria, with long-term current use of insulin (HCC)  Comprehensive Metabolic Panel    Hemoglobin A1C    Semaglutide, 1 MG/DOSE, 2 MG/1.5ML SOPN   5. Need for influenza vaccination  INFLUENZA, MDCK QUADV, 2 YRS AND OLDER, IM, PF, PREFILL SYR OR SDV, 0.5ML (FLUCELVAX QUADV, PF)   6. Erectile dysfunction, unspecified erectile dysfunction type  tadalafil (CIALIS) 5 MG tablet   7.  Benign prostatic hyperplasia without lower urinary tract symptoms  tadalafil (CIALIS) 5 MG tablet       Orders Placed This Encounter   Procedures    [employer]     This test is performed by an external laboratory and is used for result attachment only. Please fill out the appropriate paperwork required by the processing laboratory. See [email or web address] for further information. Standing Status:   Future     Standing Expiration Date:   9/28/2022    CT lung screen [Initial/Annual]     Age: 55 y.o. Smoking History:   Social History    Tobacco Use      Smoking status: Current Every Day Smoker        Packs/day: 1.00        Years: 30.00        Pack years: 30        Types: Cigarettes        Start date: 9/25/1989      Smokeless tobacco: Never Used    Alcohol use: No      Alcohol/week: 0.0 standard drinks    Drug use: No    Pack years: 30  Last CT lung screen: No previous lung cancer screening exam     Standing Status:   Future     Standing Expiration Date:   9/28/2022     Order Specific Question:   Is there documentation of shared decision making? Answer:   Yes     Order Specific Question:   Does the patient show any signs or symptoms of lung cancer? Answer:   No     Order Specific Question:   Is this the first (baseline) CT or an annual exam?     Answer:   Baseline [1]     Order Specific Question:   Is this a low dose CT or a routine CT? Answer:   Low Dose CT [1]     Order Specific Question:   Smoking Status? Answer:   Current Every Day Smoker [1]     Order Specific Question:   Smoking packs per day? Answer:   1     Order Specific Question:   Years smoking?      Answer:   30    INFLUENZA, MDCK QUADV, 2 YRS AND OLDER, IM, PF, PREFILL SYR OR SDV, 0.5ML (FLUCELVAX QUADV, PF)    Lipid Panel     Standing Status:   Future     Number of Occurrences:   1     Standing Expiration Date:   9/28/2022     Order Specific Question:   Is Patient Fasting?/# of Hours     Answer:   12    Comprehensive Metabolic Panel     Standing Status:   Future     Number of Occurrences:   1     Standing Expiration Date:   9/28/2022   Esther Jain Hemoglobin A1C     Standing Status:   Future     Number of Occurrences:   1     Standing Expiration Date:   9/28/2022    IA VISIT TO DISCUSS LUNG CA SCREEN W LDCT       Orders Placed This Encounter   Medications    Semaglutide, 1 MG/DOSE, 2 MG/1.5ML SOPN     Sig: Inject 1 mg into the skin once a week     Dispense:  6 pen     Refill:  5    tadalafil (CIALIS) 5 MG tablet     Sig: Take 1 tablet by mouth daily as needed for Erectile Dysfunction     Dispense:  90 tablet     Refill:  1     FU in 6 mos    Side effects, adverse effects of the medication prescribed today, as well as treatment plan/ rationale and result expectations have been discussed with the patient who expresses understanding and desires to proceed. Close follow up to evaluate treatment results and for coordination of care. I have reviewed the patient's medical history in detail and updated the computerized patient record. As always, patient is advised that if symptoms worsen in any way they will proceed to the nearest emergency room. No follow-ups on file. Silas Maurice APRN - CNP    Low Dose CT (LDCT) Lung Screening criteria met:     Age 55-77(Medicare) or 50-80 ([facility])   Pack year smoking >30 (Medicare) or >20 ([facility])   Still smoking or less than 15 year since quit   No sign or symptoms of lung cancer   > 11 months since last LDCT     Risks and benefits of lung cancer screening with LDCT scans discussed:    Significance of positive screen - False-positive LDCT results often occur. 95% of all positive results do not lead to a diagnosis of cancer. Usually further imaging can resolve most false-positive results; however, some patients may require invasive procedures. Over diagnosis risk - 10% to 12% of screen-detected lung cancer cases are over diagnosedthat is, the cancer would not have been detected in the patient's lifetime without the screening.     Need for follow up screens annually to continue lung cancer screening effectiveness     Risks associated with radiation from annual LDCT- Radiation exposure is about the same as for a mammogram, which is about 1/3 of the annual background radiation exposure from everyday life. Starting screening at age 54 is not likely to increase cancer risk from radiation exposure. Patients with comorbidities resulting in life expectancy of < 10 years, or that would preclude treatment of an abnormality identified on CT, should not be screened due to lack of benefit.     To obtain maximal benefit from this screening, smoking cessation and long-term abstinence from smoking is critical

## 2021-09-30 ENCOUNTER — TELEPHONE (OUTPATIENT)
Dept: CASE MANAGEMENT | Age: 56
End: 2021-09-30

## 2021-09-30 RX ORDER — TADALAFIL 5 MG/1
5 TABLET ORAL DAILY PRN
Qty: 90 TABLET | Refills: 1 | Status: SHIPPED | OUTPATIENT
Start: 2021-09-30

## 2021-09-30 NOTE — TELEPHONE ENCOUNTER
Physician documentation on smoking history and CT Lung Screening reviewed. All required documentation complete. Patient is a current every day smoker with a 3 pack year history ( 1 ppd x 30 years) per physician documentation.

## 2021-10-12 ENCOUNTER — HOSPITAL ENCOUNTER (OUTPATIENT)
Dept: CT IMAGING | Age: 56
Discharge: HOME | End: 2021-10-14
Payer: COMMERCIAL

## 2021-10-12 DIAGNOSIS — Z87.891 PERSONAL HISTORY OF TOBACCO USE: ICD-10-CM

## 2021-10-12 PROCEDURE — 71271 CT THORAX LUNG CANCER SCR C-: CPT

## 2022-01-11 ENCOUNTER — TELEPHONE (OUTPATIENT)
Dept: PHARMACY | Facility: CLINIC | Age: 57
End: 2022-01-11

## 2022-01-11 NOTE — TELEPHONE ENCOUNTER
2022 Annual Pharmacist Visit    Called patient to schedule 2022 yearly pharmacist appointment to discuss medications for Diabetes Management Program.    Spoke to patient and appointment scheduled for 01/18/2022 @ [address] [place] [address], [address] [employer]   Department, toll free: [phone] Option #3    For Pharmacy [id number] in place:  No   Recommendation Provided To: Patient/Caregiver: 1 via Telephone   Intervention Detail: Scheduled Appointment   Gap Closed?: Yes    Intervention Accepted By: Patient/Caregiver: 1   Time Spent (min): 5

## 2022-01-18 ENCOUNTER — SCHEDULED TELEPHONE ENCOUNTER (OUTPATIENT)
Dept: PHARMACY | Facility: CLINIC | Age: 57
End: 2022-01-18

## 2022-01-18 DIAGNOSIS — F17.200 SMOKER: ICD-10-CM

## 2022-01-18 DIAGNOSIS — R80.9 TYPE 2 DIABETES MELLITUS WITH MICROALBUMINURIA, WITH LONG-TERM CURRENT USE OF INSULIN (HCC): Primary | ICD-10-CM

## 2022-01-18 DIAGNOSIS — E11.29 TYPE 2 DIABETES MELLITUS WITH MICROALBUMINURIA, WITH LONG-TERM CURRENT USE OF INSULIN (HCC): Primary | ICD-10-CM

## 2022-01-18 DIAGNOSIS — Z79.4 TYPE 2 DIABETES MELLITUS WITH MICROALBUMINURIA, WITH LONG-TERM CURRENT USE OF INSULIN (HCC): Primary | ICD-10-CM

## 2022-01-18 RX ORDER — OMEPRAZOLE 20 MG/1
20 CAPSULE, DELAYED RELEASE ORAL EVERY OTHER DAY
COMMUNITY
End: 2022-05-02 | Stop reason: SDUPTHER

## 2022-01-18 NOTE — TELEPHONE ENCOUNTER
POPULATION HEALTH CLINICAL PHARMACY REVIEW - Be Well with Diabetes    Krishna Delacruz is a 55 y.o. male enrolled in the [facility] [place] [facility] Diabetes Program. Patient provided Herve Chou with verbal consent to remain in the program for this year. Medications:  Medication Sig Notes    gabapentin (NEURONTIN) 300 MG capsule Take 1 capsule by mouth 3 times daily for 90 days.      Insulin Degludec (TRESIBA FLEXTOUCH) 100 UNIT/ML SOPN 40 units each     metFORMIN (GLUCOPHAGE) 1000 MG tablet Take 1 tablet by mouth 2 times daily (with meals)     pravastatin (PRAVACHOL) 40 MG tablet TAKE ONE TABLET BY MOUTH ONE TIME A DAY AT BEDTIME FOR CHOLESTEROL     lisinopril (PRINIVIL;ZESTRIL) 10 MG tablet TAKE 1 TABLET BY MOUTH ONE TIME DAILY     empagliflozin (JARDIANCE) 25 MG tablet Take 25 mg by mouth daily     Semaglutide, 1 MG/DOSE, 2 MG/1.5ML SOPN Inject 1 mg into the skin once a week     Insulin Pen Needle (NOVOFINE PLUS) 32G X 4 MM MISC 1 each by Does not apply route daily     omeprazole (PRILOSEC) 20 MG delayed release capsule Take 1 capsule by mouth every morning (before breakfast)  takes every other day    tadalafil (CIALIS) 5 MG tablet Take 1 tablet by mouth daily as needed for Erectile Dysfunction     aspirin EC 81 MG EC tablet Take 1 tablet by mouth daily     blood glucose test strips (PRODIGY NO CODING BLOOD GLUC) strip Use to test once daily and as needed as directed by provider     Magnesium 500 MG TABS Take 1 tablet by mouth daily     BEE POLLEN PO Take 1 capsule by mouth 3 times daily  2 capsules once daily    Cyanocobalamin (VITAMIN B 12 PO) Take 1,000 mg by mouth daily     Multiple Vitamins-Minerals (ONE DAILY MULTIVITAMIN ADULT) TABS Take 1 tablet by mouth daily  Take 1 tablet every other day    Blood Glucose Monitoring Suppl (PRODIGY AUTOCODE BLOOD GLUCOSE) w/Device KIT Use to test once daily and as needed as directed by provider n/a    PRODIGY LANCETS 28G MISC Use to test once daily older) 09/28/2021    Influenza, Luna Rivas IM, (6 mo and older Fluzone, Flulaval, Fluarix and 3 yrs and older Afluria) 10/02/2018, 09/25/2019, 10/16/2020    Pneumococcal Polysaccharide ([id number]) 01/30/2014, 10/26/2017    Tdap (Boostrix, Adacel) 02/11/2016      Social History:  Social History     Tobacco Use    Smoking status: Current Every Day Smoker     Packs/day: 1.00     Years: 30.00     Pack years: 30.00     Types: Cigarettes     Start date: 9/25/1989    Smokeless tobacco: Never Used   Substance Use Topics    Alcohol use: No     Alcohol/week: 0.0 standard drinks     ASSESSMENT:  Initial Program Requirements (Y indicates has completed for the year, N indicates needs to be completed by 07/01/2022): No - Provider Visit for DM (1st)  No - A1c (1st)     Ongoing Program Requirements (Y indicates has completed for the year, N indicates needs to be completed by 12/31/2022): No - Provider Visit for DM (2nd)  N/A - ACC/diabetes educator visit (if A1c over 8%)  No - A1c (2nd)  No - Lipid panel  No - Urine microalbumin  Yes - Pneumococcal vaccination: up to date  No - Influenza vaccination for Fall 2022  n/a - Medication adherence over 70%  Yes - On statin or contraindication(s) pravastatin 40 mg tablets  Yes - On ACEi/ARB or contraindication(s) lisinopril 10 mg tablets     Formulary Medication Review:  Non-formulary or medications with cost-effective alternatives: n/a. Current medications eligible for copay waiver, up to $600, through [address] [place]:  - aspirin (With prescription), Jardiance, Tresiba, lisinopril, metformin, pravastatin, Ozempic  - Prodigy and generic pen needles and syringes     Diabetes Care:   Type 2 DM   - Managed by PCP Shahida Valencia.  - Patient's A1cs in 2021 were 3/15/21 7.3% and 9/28/21 7.4%.   - Home blood sugar records: states takes different times of day, usually an hour after breakfast and sees between 160-170  - Eye exam current (within one year): yes  - Foot exam current (within one year): not regularly  - Reduce Pill [place]:  Synjardy 12.5-1000 mg tablets BID. Patient is interested in this. Discussed if provider agrees, can complete Jardiance and metformin on hand first before switching and to make sure to not accidentally take them all together. He states understanding.  - Daily aspirin? Yes  - Adherent to ACEi/ARB and/or statin: per claims history yes. Both last filled 1/15/22, 11/22/21, 7/27/21  - Medication compliance:  · metformin- 1/17/22, 11/22/21, 7/27/21  · Jardiance- 11/22/21, 7/27/21  · Ozempic- 1/11/22, 11/22/21, 9/29/21, 7/27/21  - Current exercise: no regular exercise; he hopes to start doing this. States he has a job where he sits a lot. Discussed benefits of exercise and encouragement given. He states he is leaving soon for [place] for a month and plans to walk a couple miles around a park a day there. Other Considerations:  - Blood Pressure Goal: BP less than 140/90 mmHg due to history of DM: Is at blood pressure goal.   - Lipids: Patient is prescribed moderate-intensity statin therapy. - Smoking status: States he smokes little cigars 5 per day. He states what he smokes per day is about equivalent to 1 ppd cigarettes. He states he has never used the Nicotine patches before and would like to try these to quit smoking. Discussed appears on formulary, should be $0. Counseled how to apply patches and possible side effects. He would like writer to send a request to [facility] Herrera for prescription for Nicoderm patches    PLAN:  - Consideration(s) for provider:    · Patient is interested in switching his metformin 1000 mg BID and Jardiance 25 mg daily to Synjardy 12.5-1000 mg BID to help reduce pill burden and number of copays. If you agree, can prescription please be sent to his [facility] home delivery pharmacy? · Patient would like to quite smoking. He states he smokes 5 little cigars a day and thinks it is about equivalent to 1 ppd cigarettes.   He request a prescription for nicotine patches be sent to his [facility] mail order pharmacy if provider agrees.   - DM program gaps identified:   · Initial requirements: Provider Visit for DM (1st) and A1c (1st)   · Ongoing requirements: Provider visit for DM (2nd), ACC/diabetes educator visit (if A1c over 8%), A1c (2nd), Lipid panel, Urine microalbumin, Influenza vaccination for [id number] and Medication adherence over 70%   - Education to patient: Addressed exercise, Discussed foot care and Reminded to get yearly retinal exam   - Follow up: PCP for identified gaps or as scheduled below  - Upcoming appointments:   Future Appointments   Date Time Provider Monster Sultana   3/28/2022  3:30 PM NATALEE Vergara - CNP [facility], PharmD, [place], [place].   Department, toll free: [phone], option 3

## 2022-01-18 NOTE — TELEPHONE ENCOUNTER
Dr. Chester Caballero, APRN - CNP,    Your patient is currently enrolled in the Be Well with Diabetes program. After your patient's recent visit with a [facility] Clinical Pharmacist, the below were identified as opportunities to assist with their diabetes management:  ? Patient is interested in switching his metformin 1000 mg BID and Jardiance 25 mg daily to Synjardy 12.5-1000 mg BID to help reduce pill burden and number of copays. If you agree, can prescription please be sent to his [facility] home delivery pharmacy? ? Patient would like to quite smoking. He states he smokes 5 little cigars a day and thinks it is about equivalent to 1 ppd cigarettes. He request a prescription for nicotine patches be sent to his [facility] mail order pharmacy if provider agrees. I would be happy to provider any further outreach to patient. See pharmacist note dated 1/18/22 for complete details.     Thank you,  Arleen Rico, PharmD, [facility], [place].   Department, toll free: [phone], option 3

## 2022-01-19 RX ORDER — NICOTINE 21 MG/24HR
PATCH, TRANSDERMAL 24 HOURS TRANSDERMAL
Qty: 42 PATCH | Refills: 0 | Status: SHIPPED | OUTPATIENT
Start: 2022-01-19

## 2022-01-19 RX ORDER — EMPAGLIFLOZIN, METFORMIN HYDROCHLORIDE 12.5; 1 MG/1; MG/1
1 TABLET, EXTENDED RELEASE ORAL DAILY
Qty: 90 TABLET | Refills: 1 | Status: SHIPPED | OUTPATIENT
Start: 2022-01-19 | End: 2022-01-20

## 2022-01-19 RX ORDER — NICOTINE 21 MG/24HR
PATCH, TRANSDERMAL 24 HOURS TRANSDERMAL
Qty: 14 PATCH | Refills: 0 | Status: SHIPPED | OUTPATIENT
Start: 2022-01-19

## 2022-01-19 RX ORDER — NICOTINE 21 MG/24HR
1 PATCH, TRANSDERMAL 24 HOURS TRANSDERMAL DAILY
Qty: 90 PATCH | Refills: 0 | Status: SHIPPED | OUTPATIENT
Start: 2022-01-19 | End: 2022-01-19

## 2022-01-19 NOTE — TELEPHONE ENCOUNTER
Dr. Joel Carrillo, APRN - CNP,    Thank you for sending prescriptions. I have pended a Nicotine patch taper for the patient if you agree. Begin with step 1 (21 mg/day) for 6 weeks, followed by step 2 (14 mg/day) for 2 weeks; finish with step 3 (7 mg/day) for 2 weeks.     Thank you,  Debbie Esparza, PharmD, [facility], [place].   Department, toll free: [phone], option 3

## 2022-01-20 DIAGNOSIS — R80.9 TYPE 2 DIABETES MELLITUS WITH MICROALBUMINURIA, WITH LONG-TERM CURRENT USE OF INSULIN (HCC): ICD-10-CM

## 2022-01-20 DIAGNOSIS — Z79.4 TYPE 2 DIABETES MELLITUS WITH MICROALBUMINURIA, WITH LONG-TERM CURRENT USE OF INSULIN (HCC): ICD-10-CM

## 2022-01-20 DIAGNOSIS — E11.29 TYPE 2 DIABETES MELLITUS WITH MICROALBUMINURIA, WITH LONG-TERM CURRENT USE OF INSULIN (HCC): ICD-10-CM

## 2022-01-20 RX ORDER — EMPAGLIFLOZIN AND METFORMIN HYDROCHLORIDE 12.5; 1 MG/1; MG/1
1 TABLET ORAL 2 TIMES DAILY WITH MEALS
Qty: 180 TABLET | Refills: 1 | Status: SHIPPED | OUTPATIENT
Start: 2022-01-20 | End: 2022-05-02 | Stop reason: SDUPTHER

## 2022-01-20 NOTE — TELEPHONE ENCOUNTER
Called and spoke with Norm regarding medications sent to pharmacy in other encounter. He denies questions at this time. Encouraged to call back with any questions or concerns. No further outreach planned at this time.     Rajat Cancino, PharmD, [address], [address]  [facility] Pharmacist  Department, toll free: [phone], option 3    For [facility] in place:  No   Recommendation Provided To: Provider: 2 via Note to Provider and Pharmacy: 2   Intervention Detail: New Rx: 2, reason: Cost/Formulary Change, Needs Additional Therapy   Gap Closed?: Yes    Intervention Accepted By: Provider: 2 and Pharmacy: 2   Time Spent (min): 60

## 2022-01-20 NOTE — TELEPHONE ENCOUNTER
Dr. Vince Paredes, APRN - CNP,    Your patient is currently enrolled in the Be Well with Diabetes program. After your patient's recent visit with a [address] [place] [address]  Clinical Pharmacist, the below were identified as opportunities to assist with their diabetes management:  ? Thank you, note Synjardy sent in other encounter. I notice the daily dosage patient would get was decreased. Would recommend to keep dosage the same. Patient taking metformin 1000 mg BID and Jardiance 25 mg daily. Would recommend switching to Synjardy 12.5-1000 mg BID. I have pended updated prescription for your convenience if you agree.     Thank you,  Santa Sage, PharmD, [facility] [address]   Department, toll free: [phone], option 3

## 2022-03-28 ENCOUNTER — OFFICE VISIT (OUTPATIENT)
Dept: FAMILY MEDICINE CLINIC | Age: 57
End: 2022-03-28
Payer: COMMERCIAL

## 2022-03-28 VITALS
WEIGHT: 228 LBS | HEART RATE: 101 BPM | OXYGEN SATURATION: 95 % | BODY MASS INDEX: 37.99 KG/M2 | DIASTOLIC BLOOD PRESSURE: 70 MMHG | SYSTOLIC BLOOD PRESSURE: 114 MMHG | HEIGHT: 65 IN

## 2022-03-28 DIAGNOSIS — R80.9 TYPE 2 DIABETES MELLITUS WITH MICROALBUMINURIA, WITH LONG-TERM CURRENT USE OF INSULIN (HCC): ICD-10-CM

## 2022-03-28 DIAGNOSIS — E11.29 TYPE 2 DIABETES MELLITUS WITH MICROALBUMINURIA, WITH LONG-TERM CURRENT USE OF INSULIN (HCC): Primary | ICD-10-CM

## 2022-03-28 DIAGNOSIS — Z79.4 TYPE 2 DIABETES MELLITUS WITH MICROALBUMINURIA, WITH LONG-TERM CURRENT USE OF INSULIN (HCC): Primary | ICD-10-CM

## 2022-03-28 DIAGNOSIS — R53.83 FATIGUE, UNSPECIFIED TYPE: ICD-10-CM

## 2022-03-28 DIAGNOSIS — G62.9 NEUROPATHY: ICD-10-CM

## 2022-03-28 DIAGNOSIS — F32.A DEPRESSION, UNSPECIFIED DEPRESSION TYPE: ICD-10-CM

## 2022-03-28 DIAGNOSIS — E11.29 TYPE 2 DIABETES MELLITUS WITH MICROALBUMINURIA, WITH LONG-TERM CURRENT USE OF INSULIN (HCC): ICD-10-CM

## 2022-03-28 DIAGNOSIS — Z79.4 TYPE 2 DIABETES MELLITUS WITH MICROALBUMINURIA, WITH LONG-TERM CURRENT USE OF INSULIN (HCC): ICD-10-CM

## 2022-03-28 DIAGNOSIS — Z12.5 SCREENING PSA (PROSTATE SPECIFIC ANTIGEN): ICD-10-CM

## 2022-03-28 DIAGNOSIS — R80.9 TYPE 2 DIABETES MELLITUS WITH MICROALBUMINURIA, WITH LONG-TERM CURRENT USE OF INSULIN (HCC): Primary | ICD-10-CM

## 2022-03-28 DIAGNOSIS — J40 BRONCHITIS: ICD-10-CM

## 2022-03-28 LAB
ALBUMIN SERPL-MCNC: 4.3 G/DL (ref 3.5–4.6)
ALP BLD-CCNC: 73 U/L (ref 35–104)
ALT SERPL-CCNC: 15 U/L (ref 0–41)
ANION GAP SERPL CALCULATED.3IONS-SCNC: 9 MEQ/L (ref 9–15)
AST SERPL-CCNC: 15 U/L (ref 0–40)
BASOPHILS ABSOLUTE: 0.1 K/UL (ref 0–0.2)
BASOPHILS RELATIVE PERCENT: 0.8 %
BILIRUB SERPL-MCNC: <0.2 MG/DL (ref 0.2–0.7)
BUN BLDV-MCNC: 18 MG/DL (ref 6–20)
CALCIUM SERPL-MCNC: 9.6 MG/DL (ref 8.5–9.9)
CHLORIDE BLD-SCNC: 102 MEQ/L (ref 95–107)
CO2: 25 MEQ/L (ref 20–31)
CREAT SERPL-MCNC: 0.74 MG/DL (ref 0.7–1.2)
EOSINOPHILS ABSOLUTE: 0.2 K/UL (ref 0–0.7)
EOSINOPHILS RELATIVE PERCENT: 1.7 %
GFR AFRICAN AMERICAN: >60
GFR NON-AFRICAN AMERICAN: >60
GLOBULIN: 3.2 G/DL (ref 2.3–3.5)
GLUCOSE BLD-MCNC: 162 MG/DL (ref 70–99)
HBA1C MFR BLD: 8.3 % (ref 4.8–5.9)
HCT VFR BLD CALC: 52.3 % (ref 42–52)
HEMOGLOBIN: 17.6 G/DL (ref 14–18)
LYMPHOCYTES ABSOLUTE: 2.9 K/UL (ref 1–4.8)
LYMPHOCYTES RELATIVE PERCENT: 23.3 %
MCH RBC QN AUTO: 30.9 PG (ref 27–31.3)
MCHC RBC AUTO-ENTMCNC: 33.5 % (ref 33–37)
MCV RBC AUTO: 92.2 FL (ref 80–100)
MONOCYTES ABSOLUTE: 1.3 K/UL (ref 0.2–0.8)
MONOCYTES RELATIVE PERCENT: 10.8 %
NEUTROPHILS ABSOLUTE: 7.8 K/UL (ref 1.4–6.5)
NEUTROPHILS RELATIVE PERCENT: 63.4 %
PDW BLD-RTO: 14.3 % (ref 11.5–14.5)
PLATELET # BLD: 209 K/UL (ref 130–400)
POTASSIUM SERPL-SCNC: 4.5 MEQ/L (ref 3.4–4.9)
PROSTATE SPECIFIC ANTIGEN: 0.76 NG/ML (ref 0–4)
RBC # BLD: 5.68 M/UL (ref 4.7–6.1)
SODIUM BLD-SCNC: 136 MEQ/L (ref 135–144)
TOTAL PROTEIN: 7.5 G/DL (ref 6.3–8)
WBC # BLD: 12.3 K/UL (ref 4.8–10.8)

## 2022-03-28 PROCEDURE — 99214 OFFICE O/P EST MOD 30 MIN: CPT | Performed by: NURSE PRACTITIONER

## 2022-03-28 RX ORDER — ALBUTEROL SULFATE 90 UG/1
2 AEROSOL, METERED RESPIRATORY (INHALATION) 4 TIMES DAILY PRN
Qty: 18 G | Refills: 0 | Status: SHIPPED | OUTPATIENT
Start: 2022-03-28

## 2022-03-28 RX ORDER — ESCITALOPRAM OXALATE 10 MG/1
10 TABLET ORAL DAILY
Qty: 90 TABLET | Refills: 1 | Status: SHIPPED | OUTPATIENT
Start: 2022-03-28 | End: 2022-05-02 | Stop reason: SDUPTHER

## 2022-03-28 SDOH — ECONOMIC STABILITY: FOOD INSECURITY: WITHIN THE PAST 12 MONTHS, THE FOOD YOU BOUGHT JUST DIDN'T LAST AND YOU DIDN'T HAVE MONEY TO GET MORE.: NEVER TRUE

## 2022-03-28 SDOH — ECONOMIC STABILITY: FOOD INSECURITY: WITHIN THE PAST 12 MONTHS, YOU WORRIED THAT YOUR FOOD WOULD RUN OUT BEFORE YOU GOT MONEY TO BUY MORE.: NEVER TRUE

## 2022-03-28 ASSESSMENT — PATIENT HEALTH QUESTIONNAIRE - PHQ9
SUM OF ALL RESPONSES TO PHQ QUESTIONS 1-9: 0
SUM OF ALL RESPONSES TO PHQ9 QUESTIONS 1 & 2: 0
1. LITTLE INTEREST OR PLEASURE IN DOING THINGS: 0
2. FEELING DOWN, DEPRESSED OR HOPELESS: 0
SUM OF ALL RESPONSES TO PHQ QUESTIONS 1-9: 0

## 2022-03-28 ASSESSMENT — SOCIAL DETERMINANTS OF HEALTH (SDOH): HOW HARD IS IT FOR YOU TO PAY FOR THE VERY BASICS LIKE FOOD, HOUSING, MEDICAL CARE, AND HEATING?: NOT HARD AT ALL

## 2022-03-28 ASSESSMENT — ENCOUNTER SYMPTOMS: COUGH: 1

## 2022-03-28 NOTE — PROGRESS NOTES
Subjective  Chief Complaint   Patient presents with    6 Month Follow-Up       HPI    Cough- has been going on for about 4 weeks. Taking decongestant otc not helping. States that this tends to linger every year. No major sinus pressure/pain. DM- has not been monitoring his bp. Struggling with neuropathy still. Taking the gabapentin. Doesn't notice a lot of the difference. Taking all medications as prescribed. States that he has noticed he is somewhat more irritable lately. Asking about getting back on the lexapro. Patient Active Problem List    Diagnosis Date Noted    Type 2 diabetes mellitus with microalbuminuria ([facility] 09/26/2014    [facility]counter for [facility] 09/26/2014    Hyperlipidemia 09/26/2014    Left leg paresthesias 02/11/2019    Erectile dysfunction associated with type 2 diabetes mellitus ([facility] 02/09/2018    Tear of right retina without detachment 02/14/2014    Type 2 diabetes mellitus with peripheral neuropathy ([facility] 04/10/2013    Periodic limb movement disorder 03/08/2009    Obstructive sleep apnea 03/08/2009    History of peptic ulcer disease 06/01/1995     Past Medical History:   Diagnosis Date    Diabetes mellitus ([facility]      No past surgical history on file.   Family History   Problem Relation Age of Onset    High Blood Pressure Mother     Diabetes Father     Cancer Paternal Uncle     Diabetes Paternal Grandmother     Breast Cancer Neg Hx     Colon Cancer Neg Hx     Heart Attack Neg Hx     High Cholesterol Neg Hx     Stroke Neg Hx      Social History     Socioeconomic History    Marital status: Single     Spouse name: None    Number of children: None    Years of education: None    Highest education level: None   Occupational History    None   Tobacco Use    Smoking status: Current Every Day Smoker     Packs/day: 1.00     Years: 30.00     Pack years: 30.00     Types: Cigarettes     Start date: 9/25/1989    Smokeless tobacco: Never Used   Substance and Sexual Activity    Alcohol use: No     Alcohol/week: 0.0 standard drinks    Drug use: No    Sexual activity: None   Other Topics Concern    None   Social History Narrative    None     Social Determinants of Health     Financial Resource Strain: Low Risk     Difficulty of Paying Living Expenses: Not hard at all   Food Insecurity: No Food Insecurity    Worried About Running Out of Food in the Last Year: Never true    Donnie of Food in the Last Year: Never true   Transportation Needs:     Lack of Transportation (Medical): Not on file    Lack of Transportation (Non-Medical):  Not on file   Physical Activity:     Days of Exercise per Week: Not on file    Minutes of Exercise per Session: Not on file   Stress:     Feeling of Stress : Not on file   Social Connections:     Frequency of Communication with Friends and Family: Not on file    Frequency of Social Gatherings with Friends and Family: Not on file    Attends [employer] Services: Not on file    Active Member of [address] [place] [address] or Organizations: Not on file    Attends Club or Organization Meetings: Not on file    Marital Status: Not on file   Intimate Partner Violence:     Fear of Current or Ex-Partner: Not on file    Emotionally Abused: Not on file    Physically Abused: Not on file    Sexually Abused: Not on file   Housing Stability:     Unable to Pay for Housing in the Last Year: Not on file    Number of [place] in the Last Year: Not on file    Unstable Housing in the Last Year: Not on file     Current Outpatient Medications on File Prior to Visit   Medication Sig Dispense Refill    Empagliflozin-metFORMIN HCl (SYNJARDY) 12.5-1000 MG TABS Take 1 tablet by mouth 2 times daily (with meals) 180 tablet 1    nicotine (NICODERM CQ) 21 MG/24HR Apply 1 patch to skin daily weeks 1-6, locate to upper arm or hip 42 patch 0    nicotine (NICODERM CQ) 14 MG/24HR Apply 1 patch to skin daily weeks 7-8, locate to upper arm or hip 14 patch 0    nicotine (NICODERM CQ) 7 MG/24HR Apply 1 patch to skin daily weeks 9-10, locate to upper arm or hip 14 patch 0    omeprazole (PRILOSEC) 20 MG delayed release capsule Take 20 mg by mouth every other day      gabapentin (NEURONTIN) 300 MG capsule Take 1 capsule by mouth 3 times daily for 90 days. 270 capsule 0    Insulin Degludec (TRESIBA FLEXTOUCH) 100 UNIT/ML SOPN 40 units each 3 pen 1    pravastatin (PRAVACHOL) 40 MG tablet TAKE ONE TABLET BY MOUTH ONE TIME A DAY AT BEDTIME FOR CHOLESTEROL 90 tablet 1    lisinopril (PRINIVIL;ZESTRIL) 10 MG tablet TAKE 1 TABLET BY MOUTH ONE TIME DAILY 90 tablet 1    Semaglutide, 1 MG/DOSE, 2 MG/1.5ML SOPN Inject 1 mg into the skin once a week 6 pen 5    Insulin Pen Needle (NOVOFINE PLUS) 32G X 4 MM MISC 1 each by Does not apply route daily 200 each 1    tadalafil (CIALIS) 5 MG tablet Take 1 tablet by mouth daily as needed for Erectile Dysfunction 90 tablet 1    aspirin EC 81 MG EC tablet Take 1 tablet by mouth daily 90 tablet 1    blood glucose test strips (PRODIGY NO CODING BLOOD GLUC) strip Use to test once daily and as needed as directed by provider 100 each 3    Magnesium 500 MG TABS Take 1 tablet by mouth daily      BEE POLLEN PO Take 2 capsules by mouth daily       Cyanocobalamin (VITAMIN B 12 PO) Take 1,000 mg by mouth daily      Multiple Vitamins-Minerals (ONE DAILY MULTIVITAMIN ADULT) TABS Take 1 tablet by mouth every other day       Blood Glucose Monitoring Suppl (PRODIGY AUTOCODE BLOOD GLUCOSE) w/Device KIT Use to test once daily and as needed as directed by provider 1 kit 0    PRODIGY LANCETS 28G MISC Use to test once daily and as needed as directed by provider 100 each 3     No current facility-administered medications on file prior to visit. No Known Allergies    Review of Systems   Constitutional: Negative for fatigue. HENT: Positive for congestion. Respiratory: Positive for cough. Cardiovascular: Negative for chest pain. Objective  Vitals:    03/28/22 1528   BP: 114/70   Pulse: 101   SpO2: 95%   Weight: 228 lb (103.4 kg)   Height: 5' 5\" (1.651 m)     Physical Exam  Vitals and nursing note reviewed. Constitutional:       Appearance: Normal appearance. He is obese. HENT:      Head: Normocephalic. Nose: Nose normal.      Mouth/Throat:      Mouth: Mucous membranes are moist.      Pharynx: Oropharynx is clear. Eyes:      Extraocular Movements: Extraocular movements intact. Conjunctiva/sclera: Conjunctivae normal.      Pupils: Pupils are equal, round, and reactive to light. Cardiovascular:      Rate and Rhythm: Normal rate and regular rhythm. Pulses: Normal pulses. Heart sounds: Normal heart sounds. Pulmonary:      Effort: Pulmonary effort is normal.      Breath sounds: Normal breath sounds. Musculoskeletal:      Cervical back: Neck supple. Skin:     General: Skin is warm. Neurological:      General: No focal deficit present. Mental Status: He is alert and oriented to person, place, and time. Mental status is at baseline. Psychiatric:         Mood and Affect: Mood normal.         Behavior: Behavior normal.         Thought Content: Thought content normal.         Judgment: Judgment normal.           Assessment & Plan     Diagnosis Orders   1. Type 2 diabetes mellitus with microalbuminuria, with long-term current use of insulin ([facility])  Hemoglobin A1C    Comprehensive Metabolic Panel   2. Fatigue, unspecified type  CBC with Auto Differential   3. Screening PSA (prostate specific antigen)  PSA Screening   4. Bronchitis  albuterol sulfate HFA (VENTOLIN HFA) 108 (90 Base) MCG/ACT inhaler   5. Depression, unspecified depression type  escitalopram (LEXAPRO) 10 MG tablet   6.  Neuropathy         Orders Placed This Encounter   Procedures    Hemoglobin A1C     Standing Status:   Future     Number of Occurrences:   1     Standing Expiration Date:   3/28/2023    Comprehensive Metabolic Panel

## 2022-03-29 DIAGNOSIS — J40 BRONCHITIS: Primary | ICD-10-CM

## 2022-03-29 RX ORDER — AZITHROMYCIN 250 MG/1
250 TABLET, FILM COATED ORAL SEE ADMIN INSTRUCTIONS
Qty: 6 TABLET | Refills: 0 | Status: SHIPPED | OUTPATIENT
Start: 2022-03-29 | End: 2022-04-03

## 2022-05-02 DIAGNOSIS — R80.9 TYPE 2 DIABETES MELLITUS WITH MICROALBUMINURIA, WITH LONG-TERM CURRENT USE OF INSULIN (HCC): ICD-10-CM

## 2022-05-02 DIAGNOSIS — E11.29 TYPE 2 DIABETES MELLITUS WITH MICROALBUMINURIA, WITH LONG-TERM CURRENT USE OF INSULIN (HCC): ICD-10-CM

## 2022-05-02 DIAGNOSIS — Z79.4 TYPE 2 DIABETES MELLITUS WITH MICROALBUMINURIA, WITH LONG-TERM CURRENT USE OF INSULIN (HCC): ICD-10-CM

## 2022-05-02 DIAGNOSIS — F32.A DEPRESSION, UNSPECIFIED DEPRESSION TYPE: ICD-10-CM

## 2022-05-02 DIAGNOSIS — E11.42 TYPE 2 DIABETES MELLITUS WITH PERIPHERAL NEUROPATHY (HCC): Chronic | ICD-10-CM

## 2022-05-02 RX ORDER — ASPIRIN 81 MG/1
81 TABLET ORAL DAILY
Qty: 90 TABLET | Refills: 1 | Status: SHIPPED | OUTPATIENT
Start: 2022-05-02 | End: 2022-08-22 | Stop reason: SDUPTHER

## 2022-05-02 RX ORDER — PRAVASTATIN SODIUM 40 MG
TABLET ORAL
Qty: 90 TABLET | Refills: 1 | Status: SHIPPED | OUTPATIENT
Start: 2022-05-02 | End: 2022-08-22 | Stop reason: SDUPTHER

## 2022-05-02 RX ORDER — GABAPENTIN 300 MG/1
300 CAPSULE ORAL 3 TIMES DAILY
Qty: 270 CAPSULE | Refills: 0 | Status: SHIPPED | OUTPATIENT
Start: 2022-05-02 | End: 2022-05-02 | Stop reason: DRUGHIGH

## 2022-05-02 RX ORDER — ESCITALOPRAM OXALATE 10 MG/1
10 TABLET ORAL DAILY
Qty: 90 TABLET | Refills: 1 | Status: SHIPPED | OUTPATIENT
Start: 2022-05-02 | End: 2022-08-22 | Stop reason: SDUPTHER

## 2022-05-02 RX ORDER — OMEPRAZOLE 20 MG/1
20 CAPSULE, DELAYED RELEASE ORAL EVERY OTHER DAY
Qty: 90 CAPSULE | Refills: 1 | Status: SHIPPED | OUTPATIENT
Start: 2022-05-02 | End: 2022-08-22 | Stop reason: SDUPTHER

## 2022-05-02 RX ORDER — EMPAGLIFLOZIN AND METFORMIN HYDROCHLORIDE 12.5; 1 MG/1; MG/1
1 TABLET ORAL 2 TIMES DAILY WITH MEALS
Qty: 180 TABLET | Refills: 1 | Status: SHIPPED | OUTPATIENT
Start: 2022-05-02 | End: 2022-08-22 | Stop reason: SDUPTHER

## 2022-05-02 RX ORDER — LISINOPRIL 10 MG/1
TABLET ORAL
Qty: 90 TABLET | Refills: 1 | Status: SHIPPED | OUTPATIENT
Start: 2022-05-02 | End: 2022-08-22 | Stop reason: SDUPTHER

## 2022-05-02 NOTE — TELEPHONE ENCOUNTER
Rx requested:  Requested Prescriptions     Pending Prescriptions Disp Refills    aspirin EC 81 MG EC tablet 90 tablet 1     Sig: Take 1 tablet by mouth daily         Last Office Visit:   3/28/2022      Next Visit Date:  Future Appointments   Date Time Provider Monster Sultana   9/28/2022  8:00 AM NATALEE Arias CNP [facility] EMERGENCY [facility] AT [place]

## 2022-05-03 NOTE — TELEPHONE ENCOUNTER
[facility]  Ph. [phone]    Calling for Clarification on omeprazole  Pt previously on 1 daily  New script for every other day. wanted to verify pt is decreasing dose.

## 2022-05-10 RX ORDER — SEMAGLUTIDE 1.34 MG/ML
1.5 INJECTION, SOLUTION SUBCUTANEOUS WEEKLY
Qty: 3 PEN | Refills: 5 | Status: SHIPPED | OUTPATIENT
Start: 2022-05-10 | End: 2022-05-11 | Stop reason: SDUPTHER

## 2022-05-10 RX ORDER — BLOOD SUGAR DIAGNOSTIC
STRIP MISCELLANEOUS
Qty: 100 EACH | Refills: 3 | Status: SHIPPED | OUTPATIENT
Start: 2022-05-10

## 2022-05-10 RX ORDER — BLOOD PRESSURE TEST KIT
KIT MISCELLANEOUS
Qty: 100 EACH | Refills: 3 | Status: SHIPPED | OUTPATIENT
Start: 2022-05-10 | End: 2022-08-22 | Stop reason: SDUPTHER

## 2022-05-10 NOTE — TELEPHONE ENCOUNTER
NATALEE Elizondo - CNP  - Patient requesting refills for test strips and alcohol swabs    - also, can consider increase in dose of ozempic to 2 mg weekly    Thank you,  Darylene Morton, PharmD, [address] Pharmacist  Department: [phone]  ================================================    POPULATION HEALTH CLINICAL PHARMACY REVIEW - BE WELL WITH DIABETES: HIGH A1C  =============================================  Arabella Christian is a 55 y.o. male enrolled in the [address] [place], [place] [address] Be Well with Diabetes program.    Identified care gap(s): A1c > 8%    DM Program Prescriptions:  Current Outpatient Medications   Medication Sig Dispense Refill    aspirin EC 81 MG EC tablet Take 1 tablet by mouth daily 90 tablet 1    pravastatin (PRAVACHOL) 40 MG tablet TAKE ONE TABLET BY MOUTH ONE TIME A DAY AT BEDTIME FOR CHOLESTEROL 90 tablet 1    lisinopril (PRINIVIL;ZESTRIL) 10 MG tablet TAKE 1 TABLET BY MOUTH ONE TIME DAILY 90 tablet 1    Semaglutide, 1 MG/DOSE, 2 MG/1.5ML SOPN Inject 1 mg into the skin once a week 6 pen 5    Empagliflozin-metFORMIN HCl (SYNJARDY) 12.5-1000 MG TABS Take 1 tablet by mouth 2 times daily (with meals) 180 tablet 1    escitalopram (LEXAPRO) 10 MG tablet Take 1 tablet by mouth daily 90 tablet 1    omeprazole (PRILOSEC) 20 MG delayed release capsule Take 1 capsule by mouth every other day 90 capsule 1    gabapentin (NEURONTIN) 600 MG tablet Take 1 tablet by mouth 3 times daily for 90 days.  270 tablet 0    albuterol sulfate HFA (VENTOLIN HFA) 108 (90 Base) MCG/ACT inhaler Inhale 2 puffs into the lungs 4 times daily as needed for Wheezing 18 g 0    nicotine (NICODERM CQ) 21 MG/24HR Apply 1 patch to skin daily weeks 1-6, locate to upper arm or hip 42 patch 0    nicotine (NICODERM CQ) 14 MG/24HR Apply 1 patch to skin daily weeks 7-8, locate to upper arm or hip 14 patch 0    nicotine (NICODERM CQ) 7 MG/24HR Apply 1 patch to skin daily weeks 9-10, locate to upper arm or hip 14 patch 0    Insulin Degludec (TRESIBA FLEXTOUCH) 100 UNIT/ML SOPN 40 units each 3 pen 1    Insulin Pen Needle (NOVOFINE PLUS) 32G X 4 MM MISC 1 each by Does not apply route daily 200 each 1    tadalafil (CIALIS) 5 MG tablet Take 1 tablet by mouth daily as needed for Erectile Dysfunction 90 tablet 1    blood glucose test strips (PRODIGY NO CODING BLOOD GLUC) strip Use to test once daily and as needed as directed by provider 100 each 3    Magnesium 500 MG TABS Take 1 tablet by mouth daily      BEE POLLEN PO Take 2 capsules by mouth daily       Cyanocobalamin (VITAMIN B 12 PO) Take 1,000 mg by mouth daily      Multiple Vitamins-Minerals (ONE DAILY MULTIVITAMIN ADULT) TABS Take 1 tablet by mouth every other day       Blood Glucose Monitoring Suppl (PRODIGY AUTOCODE BLOOD GLUCOSE) w/Device KIT Use to test once daily and as needed as directed by provider 1 kit 0    PRODIGY LANCETS 28G MISC Use to test once daily and as needed as directed by provider 100 each 3     No current facility-administered medications for this visit. Allergies:  No Known Allergies     Labs:  Lab Results   Component Value Date    LABA1C 8.3 (H) 03/28/2022    LABA1C 7.4 (H) 09/28/2021    LABA1C 7.3 03/15/2021     Estimated Creatinine Clearance: 125 mL/min (based on SCr of 0.74 mg/dL). Care Team:   - Physician (PCP): Gabe Leonard (Last OV: 3/28/22)  - ACC/RD: none recent increase in A1c  - Upcoming appointments:   Future Appointments   Date Time Provider Monster Sultana   9/28/2022  8:00 AM NATALEE Schuster - CNP [facility] EMERGENCY MEDICAL [place] AT [facility]     Refill History:   Synjardy filled 5/4, 1/21  Ozempic 5/4, 1/17  tresiba 1/12 for 75 ds    Diabetes Care:  - Glycemic Goal: <7.0% and directed by provider. Is not at blood glucose goal but is on insulin therapy. .   - Appropriateness of Insulin Therapy: basal managed by NP  - Therapy Optimization: increase ozempic to 2 mg  - Medication changes since last A1c: none, just changed to combo pill synjardy  - Medication Adherence Assessment: overdue for filling tresiba    Plan:    Reached patient for review. Why does patient believe his A1c has worsened missing doses he doesn't realize. Patient does seem confused on his meds. meds reviewed with him. Confirm how taking tresiba, last filled in  for 75 ds- missing doses?  - he states he still has a lot of insulin left. Admits he must have missed doses and more than he thought. He states still has 8 pens. He states he does give 40 units in the morning. How is synjardy going?   - patient seems confused on meds. States he did not start taking synjardy yet but I explained to patient this was filled in  and just a couple days ago in May. Patient states he was finishing up metformin and jardinace however metformin was last filled in 22 and jardiance last filled 21. Explained to patient he should not have any metformin or jardiance left. Patient unable to explain why he still has metformin and jardiance. Educated patient to stop metformin and jardiance and start synjardy to help increase adherence. Testing sugars? No fills for testing supplies  States checks randomly and usually after coffee what creamer and it is 180. I had him check expiration on testing supplies and they are , no active script on file. Has lancets needs prodigy test strips and alcohol swabs    Consider increasing ozempic to 2 mg    How is smoking cessation going?  - has not decided to start nicotine patches yet    Educated patient on med adherence, ways to increase adherence, importance of having A1c controlled.       Edgar Garcia, PharmD, [address] [facility] Pharmacist  Department: [address] [place] [address] Ext in place:  No   Recommendation Provided To: Provider: 2 via Note to Provider   Intervention Detail: Dose Adjustment: 1, reason: Therapy Optimization and Refill(s) Provided   Gap Closed?: Yes    Intervention Accepted By: Provider: 2   Time Spent (min): 20

## 2022-05-11 NOTE — TELEPHONE ENCOUNTER
Reached patient for review of ozempic 2 mg dose. Patient just filled ozempic 5/4/22, therefore new script will not process yet. Educated patient on this and to continue 1 mg dose for now to not risk running out of medication until new script will process. Will work with P to get new script to fill the earliest that it can due to dose increase. Educated patient when he gets new supply of same ozempic pens, he will begin 2mg or 1.5 ml injections weekly.      Ricardo Alejandre, PharmD, [address] [facility] Pharmacist  Department: [phone]

## 2022-05-11 NOTE — TELEPHONE ENCOUNTER
Sergei Stevens, APRN - CNP  - ozempic 2 mg doses must be dispensed as the 8 mg pens- new script for pharmacy pended    Thank you!   Martha Beatty, PharmD, [address] [facility] Pharmacist  Department: [phone]    For Pharmacy Admin Tracking Only     Gap Closed?: Yes    Time Spent (min): 5

## 2022-05-31 ENCOUNTER — E-VISIT (OUTPATIENT)
Dept: FAMILY MEDICINE CLINIC | Age: 57
End: 2022-05-31
Payer: COMMERCIAL

## 2022-05-31 DIAGNOSIS — U07.1 COVID-19: Primary | ICD-10-CM

## 2022-05-31 PROCEDURE — 99421 OL DIG E/M SVC 5-10 MIN: CPT | Performed by: NURSE PRACTITIONER

## 2022-05-31 ASSESSMENT — LIFESTYLE VARIABLES
SMOKING_YEARS: 35
SMOKING_STATUS: YES

## 2022-05-31 NOTE — PROGRESS NOTES
Pt recently tested positive for covid 19 x3 days. He has high risk of complications based on his health hx. Paxlovid sent to pharmacy. This visit took 5 min to complete. He is to fu prn.

## 2022-08-22 DIAGNOSIS — E11.42 TYPE 2 DIABETES MELLITUS WITH PERIPHERAL NEUROPATHY (HCC): Chronic | ICD-10-CM

## 2022-08-22 DIAGNOSIS — F32.A DEPRESSION, UNSPECIFIED DEPRESSION TYPE: ICD-10-CM

## 2022-08-22 RX ORDER — BLOOD PRESSURE TEST KIT
KIT MISCELLANEOUS
Qty: 100 EACH | Refills: 3 | Status: SHIPPED | OUTPATIENT
Start: 2022-08-22 | End: 2022-10-30 | Stop reason: SDUPTHER

## 2022-08-22 RX ORDER — OMEPRAZOLE 20 MG/1
20 CAPSULE, DELAYED RELEASE ORAL EVERY OTHER DAY
Qty: 90 CAPSULE | Refills: 1 | Status: SHIPPED | OUTPATIENT
Start: 2022-08-22 | End: 2022-10-30 | Stop reason: SDUPTHER

## 2022-08-22 RX ORDER — LISINOPRIL 10 MG/1
TABLET ORAL
Qty: 90 TABLET | Refills: 1 | Status: SHIPPED | OUTPATIENT
Start: 2022-08-22 | End: 2022-10-30 | Stop reason: SDUPTHER

## 2022-08-22 RX ORDER — PRAVASTATIN SODIUM 40 MG
TABLET ORAL
Qty: 90 TABLET | Refills: 1 | Status: SHIPPED | OUTPATIENT
Start: 2022-08-22

## 2022-08-22 RX ORDER — EMPAGLIFLOZIN AND METFORMIN HYDROCHLORIDE 12.5; 1 MG/1; MG/1
1 TABLET ORAL 2 TIMES DAILY WITH MEALS
Qty: 180 TABLET | Refills: 1 | Status: SHIPPED | OUTPATIENT
Start: 2022-08-22 | End: 2022-10-30 | Stop reason: SDUPTHER

## 2022-08-22 RX ORDER — ESCITALOPRAM OXALATE 10 MG/1
10 TABLET ORAL DAILY
Qty: 90 TABLET | Refills: 1 | Status: SHIPPED | OUTPATIENT
Start: 2022-08-22 | End: 2022-10-30 | Stop reason: SDUPTHER

## 2022-08-23 RX ORDER — ASPIRIN 81 MG/1
81 TABLET ORAL DAILY
Qty: 90 TABLET | Refills: 0 | Status: SHIPPED | OUTPATIENT
Start: 2022-08-23 | End: 2022-10-30 | Stop reason: SDUPTHER

## 2022-08-23 NOTE — TELEPHONE ENCOUNTER
Future visit:    9/28/2022 Mariah Briones [facility] 6 month follow up     Past Visits    Date Provider Specialty Visit Type Primary Dx   03/28/2022 NATALEE Briones - CNP Family Medicine Office Visit Type 2 diabetes mellitus with microalbuminuria, with long-term current use of insulin ([facility]

## 2022-09-28 ENCOUNTER — OFFICE VISIT (OUTPATIENT)
Dept: FAMILY MEDICINE CLINIC | Age: 57
End: 2022-09-28
Payer: COMMERCIAL

## 2022-09-28 VITALS
WEIGHT: 226 LBS | HEART RATE: 85 BPM | OXYGEN SATURATION: 95 % | HEIGHT: 65 IN | SYSTOLIC BLOOD PRESSURE: 106 MMHG | DIASTOLIC BLOOD PRESSURE: 62 MMHG | BODY MASS INDEX: 37.65 KG/M2

## 2022-09-28 DIAGNOSIS — E11.42 TYPE 2 DIABETES MELLITUS WITH PERIPHERAL NEUROPATHY (HCC): ICD-10-CM

## 2022-09-28 DIAGNOSIS — R58 ECCHYMOSIS: ICD-10-CM

## 2022-09-28 DIAGNOSIS — Z00.00 WELL ADULT EXAM: Primary | ICD-10-CM

## 2022-09-28 DIAGNOSIS — E11.42 TYPE 2 DIABETES MELLITUS WITH PERIPHERAL NEUROPATHY (HCC): Primary | ICD-10-CM

## 2022-09-28 DIAGNOSIS — R53.83 FATIGUE, UNSPECIFIED TYPE: ICD-10-CM

## 2022-09-28 DIAGNOSIS — E78.5 HYPERLIPIDEMIA, UNSPECIFIED HYPERLIPIDEMIA TYPE: ICD-10-CM

## 2022-09-28 DIAGNOSIS — Z23 NEED FOR INFLUENZA VACCINATION: ICD-10-CM

## 2022-09-28 DIAGNOSIS — Z12.11 COLON CANCER SCREENING: ICD-10-CM

## 2022-09-28 DIAGNOSIS — Z87.891 PERSONAL HISTORY OF TOBACCO USE: ICD-10-CM

## 2022-09-28 LAB
ALBUMIN SERPL-MCNC: 4.6 G/DL (ref 3.5–4.6)
ALP BLD-CCNC: 58 U/L (ref 35–104)
ALT SERPL-CCNC: 15 U/L (ref 0–41)
ANION GAP SERPL CALCULATED.3IONS-SCNC: 13 MEQ/L (ref 9–15)
AST SERPL-CCNC: 15 U/L (ref 0–40)
BASOPHILS ABSOLUTE: 0.1 K/UL (ref 0–0.2)
BASOPHILS RELATIVE PERCENT: 1.2 %
BILIRUB SERPL-MCNC: 0.4 MG/DL (ref 0.2–0.7)
BUN BLDV-MCNC: 13 MG/DL (ref 6–20)
CALCIUM SERPL-MCNC: 9.6 MG/DL (ref 8.5–9.9)
CHLORIDE BLD-SCNC: 98 MEQ/L (ref 95–107)
CHOLESTEROL, TOTAL: 157 MG/DL (ref 0–199)
CO2: 24 MEQ/L (ref 20–31)
CREAT SERPL-MCNC: 0.64 MG/DL (ref 0.7–1.2)
EOSINOPHILS ABSOLUTE: 0.2 K/UL (ref 0–0.7)
EOSINOPHILS RELATIVE PERCENT: 2 %
GFR AFRICAN AMERICAN: >60
GFR NON-AFRICAN AMERICAN: >60
GLOBULIN: 3.1 G/DL (ref 2.3–3.5)
GLUCOSE BLD-MCNC: 105 MG/DL (ref 70–99)
HBA1C MFR BLD: 7.8 % (ref 4.8–5.9)
HCT VFR BLD CALC: 53.4 % (ref 42–52)
HDLC SERPL-MCNC: 30 MG/DL (ref 40–59)
HEMOGLOBIN: 18.3 G/DL (ref 14–18)
LDL CHOLESTEROL CALCULATED: 87 MG/DL (ref 0–129)
LYMPHOCYTES ABSOLUTE: 2.4 K/UL (ref 1–4.8)
LYMPHOCYTES RELATIVE PERCENT: 25.8 %
MCH RBC QN AUTO: 32.1 PG (ref 27–31.3)
MCHC RBC AUTO-ENTMCNC: 34.2 % (ref 33–37)
MCV RBC AUTO: 93.9 FL (ref 80–100)
MONOCYTES ABSOLUTE: 0.9 K/UL (ref 0.2–0.8)
MONOCYTES RELATIVE PERCENT: 10 %
NEUTROPHILS ABSOLUTE: 5.7 K/UL (ref 1.4–6.5)
NEUTROPHILS RELATIVE PERCENT: 61 %
PDW BLD-RTO: 14.7 % (ref 11.5–14.5)
PLATELET # BLD: 183 K/UL (ref 130–400)
POTASSIUM SERPL-SCNC: 5.1 MEQ/L (ref 3.4–4.9)
RBC # BLD: 5.69 M/UL (ref 4.7–6.1)
SODIUM BLD-SCNC: 135 MEQ/L (ref 135–144)
TOTAL PROTEIN: 7.7 G/DL (ref 6.3–8)
TRIGL SERPL-MCNC: 200 MG/DL (ref 0–150)
WBC # BLD: 9.4 K/UL (ref 4.8–10.8)

## 2022-09-28 PROCEDURE — 90471 IMMUNIZATION ADMIN: CPT | Performed by: NURSE PRACTITIONER

## 2022-09-28 PROCEDURE — 99396 PREV VISIT EST AGE 40-64: CPT | Performed by: NURSE PRACTITIONER

## 2022-09-28 PROCEDURE — 90674 CCIIV4 VAC NO PRSV 0.5 ML IM: CPT | Performed by: NURSE PRACTITIONER

## 2022-09-28 PROCEDURE — G0296 VISIT TO DETERM LDCT ELIG: HCPCS | Performed by: NURSE PRACTITIONER

## 2022-09-28 ASSESSMENT — ENCOUNTER SYMPTOMS
SHORTNESS OF BREATH: 0
COLOR CHANGE: 1
COUGH: 0

## 2022-09-28 NOTE — PROGRESS NOTES
Subjective  Chief Complaint   Patient presents with    Employment Physical       HPI    Here today for 6 month follow up. Needs Be Well health maintenance completed. DM- checks periodically. Around low 100's. Neuropathy- numbness still persistent in both legs. Has noticed skin color changes on right foot. Not open. Not worsening. HTN- has been running well. No hyper/hypotension symptoms. Taking all medications as prescribed. Patient Active Problem List    Diagnosis Date Noted    Type 2 diabetes mellitus with microalbuminuria ([facility] 09/26/2014    Encounter for long-term (current) insulin use ([facility] 09/26/2014    Hyperlipidemia 09/26/2014    Left leg paresthesias 02/11/2019    Erectile dysfunction associated with type 2 diabetes mellitus ([facility] 02/09/2018    Tear of right retina without detachment 02/14/2014    Type 2 diabetes mellitus with peripheral neuropathy ([facility] 04/10/2013    Periodic limb movement disorder 03/08/2009    Obstructive sleep apnea 03/08/2009    History of peptic ulcer disease 06/01/1995     Past Medical History:   Diagnosis Date    Diabetes mellitus ([facility]      No past surgical history on file.   Family History   Problem Relation Age of Onset    High Blood Pressure Mother     Diabetes Father     Cancer Paternal Uncle     Diabetes Paternal Grandmother     Breast Cancer Neg Hx     Colon Cancer Neg Hx     Heart Attack Neg Hx     High Cholesterol Neg Hx     Stroke Neg Hx      Social History     Socioeconomic History    Marital status: Single     Spouse name: None    Number of children: None    Years of education: None    Highest education level: None   Tobacco Use    Smoking status: Every Day     Packs/day: 1.00     Years: 30.00     Pack years: 30.00     Types: Cigarettes     Start date: 9/25/1989    Smokeless tobacco: Never   Substance and Sexual Activity    Alcohol use: No     Alcohol/week: 0.0 standard drinks    Drug use: No     Social Determinants of Health     Financial Resource Strain: Low Risk     Difficulty of Paying Living Expenses: Not hard at all   Food Insecurity: No Food Insecurity    Worried About Running Out of Food in the Last Year: Never true    Ran Out of Food in the Last Year: Never true     Current Outpatient Medications on File Prior to Visit   Medication Sig Dispense Refill    aspirin EC 81 MG EC tablet Take 1 tablet by mouth daily 90 tablet 0    pravastatin (PRAVACHOL) 40 MG tablet TAKE ONE TABLET BY MOUTH ONE TIME A DAY AT BEDTIME FOR CHOLESTEROL 90 tablet 1    lisinopril (PRINIVIL;ZESTRIL) 10 MG tablet TAKE 1 TABLET BY MOUTH ONE TIME DAILY 90 tablet 1    Empagliflozin-metFORMIN HCl (SYNJARDY) 12.5-1000 MG TABS Take 1 tablet by mouth 2 times daily (with meals) 180 tablet 1    escitalopram (LEXAPRO) 10 MG tablet Take 1 tablet by mouth daily 90 tablet 1    omeprazole (PRILOSEC) 20 MG delayed release capsule Take 1 capsule by mouth every other day 90 capsule 1    Alcohol Swabs PADS Use to test blood sugars once a day as directed 100 each 3    Semaglutide, 2 MG/DOSE, 8 MG/3ML SOPN Inject 2 mg into the skin once a week 9 mL 1    blood glucose test strips (PRODIGY NO CODING BLOOD GLUC) strip Use to test sugar daily or as directed 100 each 3    albuterol sulfate HFA (VENTOLIN HFA) 108 (90 Base) MCG/ACT inhaler Inhale 2 puffs into the lungs 4 times daily as needed for Wheezing 18 g 0    nicotine (NICODERM CQ) 21 MG/24HR Apply 1 patch to skin daily weeks 1-6, locate to upper arm or hip 42 patch 0    nicotine (NICODERM CQ) 14 MG/24HR Apply 1 patch to skin daily weeks 7-8, locate to upper arm or hip 14 patch 0    nicotine (NICODERM CQ) 7 MG/24HR Apply 1 patch to skin daily weeks 9-10, locate to upper arm or hip 14 patch 0    Insulin Degludec (TRESIBA FLEXTOUCH) 100 UNIT/ML SOPN 40 units each 3 pen 1    Insulin Pen Needle (NOVOFINE PLUS) 32G X 4 MM MISC 1 each by Does not apply route daily 200 each 1    tadalafil (CIALIS) 5 MG tablet Take 1 tablet by mouth daily as needed for Erectile Dysfunction 90 tablet 1    blood glucose test strips (PRODIGY NO CODING BLOOD GLUC) strip Use to test once daily and as needed as directed by provider 100 each 3    Magnesium 500 MG TABS Take 1 tablet by mouth daily      BEE POLLEN PO Take 2 capsules by mouth daily       Cyanocobalamin (VITAMIN B 12 PO) Take 1,000 mg by mouth daily      Multiple Vitamins-Minerals (ONE DAILY MULTIVITAMIN ADULT) TABS Take 1 tablet by mouth every other day       Blood Glucose Monitoring Suppl (PRODIGY AUTOCODE BLOOD GLUCOSE) w/Device KIT Use to test once daily and as needed as directed by provider 1 kit 0    PRODIGY LANCETS 28G MISC Use to test once daily and as needed as directed by provider 100 each 3    gabapentin (NEURONTIN) 600 MG tablet Take 1 tablet by mouth 3 times daily for 90 days. 270 tablet 0     No current facility-administered medications on file prior to visit. No Known Allergies    Review of Systems   Constitutional:  Negative for fatigue. Respiratory:  Negative for cough and shortness of breath. Cardiovascular:  Negative for chest pain. Skin:  Positive for color change. Objective  Vitals:    09/28/22 1056   BP: 106/62   Pulse: 85   SpO2: 95%   Weight: 226 lb (102.5 kg)   Height: 5' 5\" (1.651 m)     Physical Exam  Vitals and nursing note reviewed. Constitutional:       Appearance: Normal appearance. HENT:      Head: Normocephalic. Nose: Nose normal.      Mouth/Throat:      Mouth: Mucous membranes are moist.      Pharynx: Oropharynx is clear. Eyes:      Extraocular Movements: Extraocular movements intact. Conjunctiva/sclera: Conjunctivae normal.      Pupils: Pupils are equal, round, and reactive to light. Cardiovascular:      Rate and Rhythm: Normal rate and regular rhythm. Pulses: Normal pulses. Heart sounds: Normal heart sounds. Pulmonary:      Effort: Pulmonary effort is normal.      Breath sounds: Normal breath sounds.    Musculoskeletal:      Cervical back: Neck Answer:   No     Order Specific Question:   Smoking Status? Answer:   Every Day [1]     Order Specific Question:   Smoking packs per day? Answer:   1     Order Specific Question:   Years smoking? Answer:   30    Influenza, FLUCELVAX, (age 10 mo+), IM, Preservative Free, 0.5 mL    VT VISIT TO DISCUSS LUNG CA SCREEN W LDCT     Side effects, adverse effects of the medication prescribed today, as well as treatment plan/ rationale and result expectations have been discussed with the patient who expresses understanding and desires to proceed. Close follow up to evaluate treatment results and for coordination of care. I have reviewed the patient's medical history in detail and updated the computerized patient record. As always, patient is advised that if symptoms worsen in any way they will proceed to the nearest emergency room. NATALEE Goodson - CNP  Low Dose CT (LDCT) Lung Screening criteria met:     Age 50-77(Medicare) or 50-80 (USPST)   Pack year smoking >20   Still smoking or less than 15 year since quit   No sign or symptoms of lung cancer   > 11 months since last LDCT     Risks and benefits of lung cancer screening with LDCT scans discussed:    Significance of positive screen - False-positive LDCT results often occur. 95% of all positive results do not lead to a diagnosis of cancer. Usually further imaging can resolve most false-positive results; however, some patients may require invasive procedures. Over diagnosis risk - 10% to 12% of screen-detected lung cancer cases are over diagnosed--that is, the cancer would not have been detected in the patient's lifetime without the screening. Need for follow up screens annually to continue lung cancer screening effectiveness     Risks associated with radiation from annual LDCT- Radiation exposure is about the same as for a mammogram, which is about 1/3 of the annual background radiation exposure from everyday life.   Starting screening

## 2022-09-29 DIAGNOSIS — E11.42 TYPE 2 DIABETES MELLITUS WITH PERIPHERAL NEUROPATHY (HCC): ICD-10-CM

## 2022-09-30 NOTE — TELEPHONE ENCOUNTER
Future Appointments    Encounter Information    Provider Department Appt Notes   10/13/2022 [facility] CT ROOM 1 [facility] CT Scan [facility], [place],, main reg, prep to pt., sched, ofc, galdino   3/30/2023 NATALEE Farias - [facility] 6 month follow up     Past Visits    Date Provider Specialty Visit Type Primary Dx   09/28/2022 NATALEE Farias - CNP Family Medicine Office Visit Well adult exam   03/28/2022 NATALEE Farias - CNP Family Medicine Office Visit Type 2 diabetes mellitus with microalbuminuria, with long-term current use of insulin ([facility] [address]

## 2022-10-03 RX ORDER — GABAPENTIN 600 MG/1
600 TABLET ORAL 3 TIMES DAILY
Qty: 270 TABLET | Refills: 0 | Status: SHIPPED | OUTPATIENT
Start: 2022-10-03 | End: 2023-01-01

## 2022-10-11 ENCOUNTER — TELEPHONE (OUTPATIENT)
Dept: CARDIOTHORACIC SURGERY | Age: 57
End: 2022-10-11

## 2022-10-13 ENCOUNTER — HOSPITAL ENCOUNTER (OUTPATIENT)
Dept: CT IMAGING | Age: 57
Discharge: HOME | End: 2022-10-15
Payer: COMMERCIAL

## 2022-10-13 ENCOUNTER — TELEPHONE (OUTPATIENT)
Dept: FAMILY MEDICINE CLINIC | Age: 57
End: 2022-10-13

## 2022-10-13 DIAGNOSIS — E11.29 TYPE 2 DIABETES MELLITUS WITH MICROALBUMINURIA, WITH LONG-TERM CURRENT USE OF INSULIN (HCC): ICD-10-CM

## 2022-10-13 DIAGNOSIS — R80.9 TYPE 2 DIABETES MELLITUS WITH MICROALBUMINURIA, WITH LONG-TERM CURRENT USE OF INSULIN (HCC): ICD-10-CM

## 2022-10-13 DIAGNOSIS — Z87.891 PERSONAL HISTORY OF TOBACCO USE: ICD-10-CM

## 2022-10-13 DIAGNOSIS — Z79.4 TYPE 2 DIABETES MELLITUS WITH MICROALBUMINURIA, WITH LONG-TERM CURRENT USE OF INSULIN (HCC): ICD-10-CM

## 2022-10-13 PROCEDURE — 71271 CT THORAX LUNG CANCER SCR C-: CPT

## 2022-10-13 RX ORDER — INSULIN DEGLUDEC INJECTION 100 U/ML
INJECTION, SOLUTION SUBCUTANEOUS
Qty: 3 ADJUSTABLE DOSE PRE-FILLED PEN SYRINGE | Refills: 3 | Status: SHIPPED | OUTPATIENT
Start: 2022-10-13

## 2022-10-13 NOTE — TELEPHONE ENCOUNTER
Pharmacy called to get clarification on medication, Tresiba. Spoke with provider and got current directions for medication. Pt is taking 40 units sub q nightly with 3 month supply. Pharmacy is aware of directions.

## 2022-10-14 ENCOUNTER — CARE COORDINATION (OUTPATIENT)
Dept: OTHER | Facility: CLINIC | Age: 57
End: 2022-10-14

## 2022-10-14 NOTE — CARE COORDINATION
Ambulatory Care Coordination Note  10/14/2022    ACC: Sheree Weeks RN    Ambulatory Care Manager [facility])  contacted the patient via iOmando message to introduce ACM program, benefits of participation, and provide contact information. Will continue to outreach to patient with a follow up phone call. Prior to Admission medications    Medication Sig Start Date End Date Taking? Authorizing Provider   Insulin Degludec (TRESIBA FLEXTOUCH) 100 UNIT/ML SOPN 40 units each 10/13/22   NATALEE Flower CNP   Semaglutide, 2 MG/DOSE, 8 MG/3ML SOPN Inject 2 mg into the skin once a week 10/13/22   NATALEE Flower CNP   gabapentin (NEURONTIN) 600 MG tablet Take 1 tablet by mouth 3 times daily for 90 days.  10/3/22 1/1/23  NATALEE Flower CNP   aspirin EC 81 MG EC tablet Take 1 tablet by mouth daily 8/23/22   NATALEE Flower CNP   pravastatin (PRAVACHOL) 40 MG tablet TAKE ONE TABLET BY MOUTH ONE TIME A DAY AT BEDTIME FOR CHOLESTEROL 8/22/22   NATALEE Flower CNP   lisinopril (PRINIVIL;ZESTRIL) 10 MG tablet TAKE 1 TABLET BY MOUTH ONE TIME DAILY 8/22/22   NATALEE Flower CNP   Empagliflozin-metFORMIN HCl [facility]) 12.5-1000 MG TABS Take 1 tablet by mouth 2 times daily (with meals) 8/22/22   NATALEE Flower CNP   escitalopram (LEXAPRO) 10 MG tablet Take 1 tablet by mouth daily 8/22/22   NATALEE Flower CNP   omeprazole (PRILOSEC) 20 MG delayed release capsule Take 1 capsule by mouth every other day 8/22/22   NATALEE Flower CNP   Alcohol Swabs PADS Use to test blood sugars once a day as directed 8/22/22   NATALEE Flower CNP   blood glucose test strips (PRODIGY NO CODING BLOOD GLUC) strip Use to test sugar daily or as directed 5/10/22   NATALEE Flower CNP   albuterol sulfate HFA (VENTOLIN HFA) 108 (90 Base) MCG/ACT inhaler Inhale 2 puffs into the lungs 4 times daily as needed for Wheezing 3/28/22   Massiel Augustin APRN - CNP   nicotine (Ronit Sarah) 21 MG/24HR Apply 1 patch to skin daily weeks 1-6, locate to upper arm or hip 1/19/22   NATALEE Roman CNP   nicotine (NICODERM CQ) 14 MG/24HR Apply 1 patch to skin daily weeks 7-8, locate to upper arm or hip 1/19/22   NATALEE Roman CNP   nicotine (NICODERM CQ) 7 MG/24HR Apply 1 patch to skin daily weeks 9-10, locate to upper arm or hip 1/19/22   NATALEE Roman CNP   Insulin Pen Needle (NOVOFINE PLUS) 32G X 4 MM MISC 1 each by Does not apply route daily 11/22/21   NATALEE Roman CNP   tadalafil (CIALIS) 5 MG tablet Take 1 tablet by mouth daily as needed for Erectile Dysfunction 9/30/21   NATALEE Roman CNP   blood glucose test strips (PRODIGY NO CODING BLOOD GLUC) strip Use to test once daily and as needed as directed by provider 1/5/21   NATALEE Roman CNP   Magnesium 500 MG TABS Take 1 tablet by mouth daily    Historical Provider, MD   BEE POLLEN PO Take 2 capsules by mouth daily     Historical Provider, MD   Cyanocobalamin (VITAMIN B 12 PO) Take 1,000 mg by mouth daily    Historical Provider, MD   Multiple Vitamins-Minerals (ONE DAILY MULTIVITAMIN ADULT) TABS Take 1 tablet by mouth every other day     Historical Provider, MD   Blood Glucose Monitoring Suppl (PRODIGY AUTOCODE BLOOD GLUCOSE) w/Device KIT Use to test once daily and as needed as directed by provider 2/7/19   Ana Love,    PRODIGY LANCETS 28G MISC Use to test once daily and as needed as directed by provider 2/7/19   Ana Love, DO       Future Appointments   Date Time Provider Monster Sultana   3/30/2023 11:15 AM NATALEE Roman CNP [facility] AT [place]

## 2022-10-30 DIAGNOSIS — F32.A DEPRESSION, UNSPECIFIED DEPRESSION TYPE: ICD-10-CM

## 2022-10-30 DIAGNOSIS — E11.42 TYPE 2 DIABETES MELLITUS WITH PERIPHERAL NEUROPATHY (HCC): Chronic | ICD-10-CM

## 2022-10-31 RX ORDER — ESCITALOPRAM OXALATE 10 MG/1
10 TABLET ORAL DAILY
Qty: 90 TABLET | Refills: 1 | OUTPATIENT
Start: 2022-10-31

## 2022-10-31 RX ORDER — EMPAGLIFLOZIN AND METFORMIN HYDROCHLORIDE 12.5; 1 MG/1; MG/1
1 TABLET ORAL 2 TIMES DAILY WITH MEALS
Qty: 180 TABLET | Refills: 1 | OUTPATIENT
Start: 2022-10-31

## 2022-10-31 RX ORDER — ASPIRIN 81 MG/1
81 TABLET ORAL DAILY
Qty: 90 TABLET | Refills: 0 | OUTPATIENT
Start: 2022-10-31

## 2022-10-31 RX ORDER — LISINOPRIL 10 MG/1
TABLET ORAL
Qty: 90 TABLET | Refills: 1 | Status: SHIPPED | OUTPATIENT
Start: 2022-10-31

## 2022-10-31 RX ORDER — OMEPRAZOLE 20 MG/1
20 CAPSULE, DELAYED RELEASE ORAL EVERY OTHER DAY
Qty: 90 CAPSULE | Refills: 1 | Status: SHIPPED | OUTPATIENT
Start: 2022-10-31

## 2022-10-31 RX ORDER — BLOOD PRESSURE TEST KIT
KIT MISCELLANEOUS
Qty: 100 EACH | Refills: 3 | Status: SHIPPED | OUTPATIENT
Start: 2022-10-31

## 2022-10-31 RX ORDER — OMEPRAZOLE 20 MG/1
20 CAPSULE, DELAYED RELEASE ORAL EVERY OTHER DAY
Qty: 90 CAPSULE | Refills: 1 | OUTPATIENT
Start: 2022-10-31

## 2022-10-31 RX ORDER — BLOOD PRESSURE TEST KIT
KIT MISCELLANEOUS
Qty: 100 EACH | Refills: 3 | OUTPATIENT
Start: 2022-10-31

## 2022-10-31 RX ORDER — ESCITALOPRAM OXALATE 10 MG/1
10 TABLET ORAL DAILY
Qty: 90 TABLET | Refills: 1 | Status: SHIPPED | OUTPATIENT
Start: 2022-10-31

## 2022-10-31 RX ORDER — ASPIRIN 81 MG/1
81 TABLET ORAL DAILY
Qty: 90 TABLET | Refills: 0 | Status: SHIPPED | OUTPATIENT
Start: 2022-10-31

## 2022-10-31 RX ORDER — EMPAGLIFLOZIN AND METFORMIN HYDROCHLORIDE 12.5; 1 MG/1; MG/1
1 TABLET ORAL 2 TIMES DAILY WITH MEALS
Qty: 180 TABLET | Refills: 1 | Status: SHIPPED | OUTPATIENT
Start: 2022-10-31

## 2022-10-31 NOTE — TELEPHONE ENCOUNTER
Patient requesting medication refill.  Please approve or deny this request.    Rx requested:  Requested Prescriptions     Pending Prescriptions Disp Refills    aspirin EC 81 MG EC tablet 90 tablet 0     Sig: Take 1 tablet by mouth daily         Last Office Visit:   9/28/2022      Next Visit Date:  Future Appointments   Date Time Provider Monster Sultana   3/30/2023 11:15 AM NATALEE Velásquez CNP [facility] EMERGENCY [facility] AT [place]

## 2022-12-03 ENCOUNTER — CARE COORDINATION (OUTPATIENT)
Dept: OTHER | Facility: CLINIC | Age: 57
End: 2022-12-03

## 2022-12-03 NOTE — CARE COORDINATION
ACM will sign off as patient has follow up appointments with providers scheduled/ completed routine/ diagnostic testing completed/ scheduled compliant with medication regimen appropriate utilization of services no ED or hospital admissions in past 3 months for chronic conditions DM management program requirements met. Bri Diaz

## 2023-01-10 DIAGNOSIS — F32.A DEPRESSION, UNSPECIFIED DEPRESSION TYPE: ICD-10-CM

## 2023-01-10 DIAGNOSIS — E11.42 TYPE 2 DIABETES MELLITUS WITH PERIPHERAL NEUROPATHY (HCC): Chronic | ICD-10-CM

## 2023-01-10 RX ORDER — EMPAGLIFLOZIN AND METFORMIN HYDROCHLORIDE 12.5; 1 MG/1; MG/1
1 TABLET ORAL 2 TIMES DAILY WITH MEALS
Qty: 180 TABLET | Refills: 1 | Status: SHIPPED | OUTPATIENT
Start: 2023-01-10

## 2023-01-10 RX ORDER — PRAVASTATIN SODIUM 40 MG
TABLET ORAL
Qty: 90 TABLET | Refills: 1 | Status: SHIPPED | OUTPATIENT
Start: 2023-01-10

## 2023-01-10 RX ORDER — ESCITALOPRAM OXALATE 10 MG/1
10 TABLET ORAL DAILY
Qty: 90 TABLET | Refills: 1 | Status: SHIPPED | OUTPATIENT
Start: 2023-01-10

## 2023-01-10 RX ORDER — OMEPRAZOLE 20 MG/1
20 CAPSULE, DELAYED RELEASE ORAL EVERY OTHER DAY
Qty: 90 CAPSULE | Refills: 1 | Status: SHIPPED | OUTPATIENT
Start: 2023-01-10

## 2023-01-10 RX ORDER — LISINOPRIL 10 MG/1
TABLET ORAL
Qty: 90 TABLET | Refills: 1 | Status: SHIPPED | OUTPATIENT
Start: 2023-01-10

## 2023-01-12 ENCOUNTER — TELEPHONE (OUTPATIENT)
Dept: PHARMACY | Facility: CLINIC | Age: 58
End: 2023-01-12

## 2023-01-12 NOTE — TELEPHONE ENCOUNTER
2023 Annual Pharmacist Visit  **Patient is [facility]**    Called patient to schedule 2023 yearly pharmacist appointment to discuss medications for Diabetes Management Program.      No answer. Left VM on 1/12/23: Please call back at [phone] Option #3.      Isaias Garcia    Department, toll free: [phone] Option #3

## 2023-01-17 NOTE — TELEPHONE ENCOUNTER
Patient called asking to reschedule 2023 Annual Pharmacist Visit due to him being out of town on 1/31. Rescheduled for 01.26.23 @ [facility].

## 2023-01-17 NOTE — TELEPHONE ENCOUNTER
Second attempt made to contact patient to schedule 2023 yearly pharmacist appointment to discuss medications for Diabetes Management Program.    Spoke with patient scheduled for 1/31/23 at [address] Only    Program: [address] in place:  No  Recommendation Provided To: Patient/Caregiver: 1 via Telephone  Intervention Detail: Scheduled Appointment  Intervention Accepted By: Patient/Caregiver: 1  Gap Closed?: Yes   Time Spent (min): Olimpia De La Vega [facility] Specialist  Department, toll free: [phone] Option #3

## 2023-01-26 ENCOUNTER — TELEPHONE (OUTPATIENT)
Dept: PHARMACY | Facility: CLINIC | Age: 58
End: 2023-01-26

## 2023-01-26 NOTE — TELEPHONE ENCOUNTER
[facility] CLINICAL PHARMACY REVIEW - Be Well with Diabetes    Seamus Fernandez is a 65 y.o. male enrolled in the [address] [place] [address],[address] [facility] Employee Diabetes Program. Patient provided Pj Dutton with verbal consent to remain in the program for this year.  Patient enrolled 2018    Insurance through the following employer: [address] [place] [address]    Medications:  Current Outpatient Medications   Medication Instructions    albuterol sulfate HFA (VENTOLIN HFA) 108 (90 Base) MCG/ACT inhaler 2 puffs, Inhalation, 4 TIMES DAILY PRN  - has but not using regularly; thinks had for COVID    Alcohol Swabs PADS Use to test blood sugars once a day as directed    aspirin EC 81 mg, Oral, DAILY    BEE POLLEN PO 2 capsules, Oral, DAILY    Blood Glucose Monitoring Suppl (PRODIGY AUTOCODE BLOOD GLUCOSE) w/Device KIT Use to test once daily and as needed as directed by provider    blood glucose test strips (PRODIGY NO CODING BLOOD GLUC) strip Use to test once daily and as needed as directed by provider    blood glucose test strips (PRODIGY NO CODING BLOOD GLUC) strip Use to test sugar daily or as directed    Cyanocobalamin (VITAMIN B 12 PO) 1,000 mg, Oral, DAILY  - has not been using  - see below, numbness; edu vitamin B12 may help with nerve conduction, can also experience deficiency when taking metformin; he will restart this to see if it helps    Empagliflozin-metFORMIN HCl (SYNJARDY) 12.5-1000 MG TABS 1 tablet, Oral, 2 TIMES DAILY WITH MEALS    escitalopram (LEXAPRO) 10 mg, Oral, DAILY    gabapentin (NEURONTIN) 600 mg, Oral, 3 TIMES DAILY  - currently taking once daily in morning  - does not have pain in feet but PCP thinking will help with numbness  - has been tired/groggy  - encouraged to try taking at night to see if helps with grogginess; may need to increase frequency if not helping with numbness    Insulin Degludec (TRESIBA FLEXTOUCH) 100 UNIT/ML SOPN 40 units each    Insulin Pen Needle (NOVOFINE PLUS) 32G X 4 MM MISC 1 each, Does not apply, DAILY    lisinopril (PRINIVIL;ZESTRIL) 10 MG tablet TAKE 1 TABLET BY MOUTH ONE TIME DAILY    Magnesium 500 MG TABS 1 tablet, Oral, DAILY    Multiple Vitamins-Minerals (ONE DAILY MULTIVITAMIN ADULT) TABS 1 tablet, Oral, EVERY OTHER DAY    nicotine (NICODERM CQ) 14 MG/24HR Apply 1 patch to skin daily weeks 7-8, locate to upper arm or hip    nicotine (NICODERM CQ) 21 MG/24HR Apply 1 patch to skin daily weeks 1-6, locate to upper arm or hip    nicotine (NICODERM CQ) 7 MG/24HR Apply 1 patch to skin daily weeks 9-10, locate to upper arm or hip    omeprazole (PRILOSEC) 20 mg, Oral, EVERY OTHER DAY  - taking daily since having sx; slightly improved heartburn but having constipation; trying to drink water and stay hydrated; also encouraged regularly physical activity    Ozempic (1 MG/DOSE) 2 mg, SubCUTAneous, WEEKLY  - states had to get 1 mg Rx due to shortage, will leave on list for now    pravastatin (PRAVACHOL) 40 MG tablet TAKE ONE TABLET BY MOUTH ONE TIME A DAY AT BEDTIME FOR CHOLESTEROL    PRODIGY LANCETS 28G MISC Use to test once daily and as needed as directed by provider    Semaglutide (2 MG/DOSE) 2 mg, SubCUTAneous, WEEKLY  - thinks has been doing for about a year  - sulphur burps but no other ADRs; is having success with weight loss (has put on a few pounds over winter but otherwise weight loss)  - typically taking Fridays, denies missed doses - uses calendar    tadalafil (CIALIS) 5 mg, Oral, DAILY PRN     Current Pharmacy: [facility]  Current testing supplies/frequency: [employer] daily - meter working well, usually testing AMFBG  Pen needles/syringes: Leader 32G x 4mm qty 100 (for Tresiba) - size OK    Allergies:  No Known Allergies   Vitals/Labs:  BP Readings from Last 3 Encounters:   09/28/22 106/62   03/28/22 114/70   09/28/21 130/72     Wt Readings from Last 3 Encounters:   09/28/22 226 lb (102.5 kg)   03/28/22 228 lb (103.4 kg)   09/28/21 229 lb (103.9 kg)     Lab Results   Component Value Date    LABMICR 1.90 12/27/2019     Lab Results   Component Value Date    LABA1C 7.8 (H) 09/28/2022    LABA1C 8.3 (H) 03/28/2022    LABA1C 7.4 (H) 09/28/2021     Component      Latest Ref Rng & Units 9/28/2022 9/28/2021 8/6/2020          10:06 AM  2:26 PM  9:49 AM   CHOLESTEROL, TOTAL, 188373      0 - 199 mg/dL 157 129 126   Triglycerides      0 - 150 mg/dL 200 (H) 244 (H) 250 (H)   HDL Cholesterol      40 - 59 mg/dL 30 (L) 27 (L) 27 (L)   LDL Calculated      0 - 129 mg/dL 87 53 49     ALT   Date Value Ref Range Status   09/28/2022 15 0 - 41 U/L Final     AST   Date Value Ref Range Status   09/28/2022 15 0 - 40 U/L Final     The 10-year ASCVD risk score (Nawaf MUELLER, et al., 2019) is: 17.4%    Values used to calculate the score:      Age: 57 years      Sex: Male      Is Non- : No      Diabetic: Yes      Tobacco smoker: Yes      Systolic Blood Pressure: 106 mmHg      Is BP treated: No      HDL Cholesterol: 30 mg/dL      Total Cholesterol: 157 mg/dL     Lab Results   Component Value Date    CREATININE 0.64 (L) 09/28/2022     Estimated Creatinine Clearance: 142 mL/min (A) (based on SCr of 0.64 mg/dL (L)).    Lab Results   Component Value Date/Time    LABGLOM >60.0 09/28/2022 10:06 AM    LABGLOM >60.0 03/28/2022 04:47 PM    LABGLOM >60.0 09/28/2021 02:26 PM    LABGLOM >60.0 08/06/2020 09:49 AM       Immunizations:  Immunization History   Administered Date(s) Administered    COVID-19, MODERNA BLUE border, Primary or Immunocompromised, (age 12y+), IM, 100 mcg/0.5mL 12/14/2020    COVID-19, PFIZER Bivalent BOOSTER, DO NOT Dilute, (age 12y+), IM, 30 mcg/0.3 mL 09/30/2022    COVID-19, PFIZER GRAY top, DO NOT Dilute, (age 12 y+), IM, 30 mcg/0.3 mL 05/27/2022    COVID-19, PFIZER PURPLE top, DILUTE for use, (age 12 y+), 30mcg/0.3mL 04/01/2021, 04/24/2021, 10/26/2021    Fluvirin 4 years and over 10/29/2015    Influenza 10/07/2011, 01/30/2014    Influenza Vaccine, unspecified  formulation 10/07/1995, 10/02/1996, 10/02/1998, 10/25/1999, 12/05/2000, 10/23/2002, 02/01/2005, 10/07/2011, 01/30/2014, 10/14/2014, 10/29/2015, 10/26/2017    Influenza Virus Vaccine 10/07/1995, 10/02/1996, 10/02/1998, 10/25/1999, 12/05/2000, 10/23/2002, 02/01/2005, 10/14/2014    Influenza, AFLURIA (age 1 yrs+), FLUZONE, (age 10 mo+), MDV, 0.5mL 10/02/2018, 09/25/2019, 10/16/2020    Influenza, FLUCELVAX, (age 10 mo+), MDCK, PF, 0.5mL 09/28/2021, 09/28/2022    Pneumococcal Polysaccharide ([id number]) 01/30/2014, 10/26/2017    Tdap (Boostrix, Adacel) 02/11/2016      Social History:  Social History     Tobacco Use    Smoking status: Every Day     Packs/day: 1.00     Years: 30.00     Pack years: 30.00     Types: Cigarettes     Start date: 9/25/1989    Smokeless tobacco: Never   Substance Use Topics    Alcohol use: No     Alcohol/week: 0.0 standard drinks     ASSESSMENT:  Initial Program Requirements (Y indicates has completed for the year, N indicates needs to be completed by 07/01/2023): No - Provider Visit for DM (1st)  No- A1c (1st)    Ongoing Program Requirements (Y indicates has completed for the year, N indicates needs to be completed by 12/31/2023): No - Provider Visit for DM (2nd)  N/A - ACC/diabetes educator visit (will need if A1c becomes over 8%)  No - A1c (2nd)  No - Lipid panel  No - Urine microalbumin  No - Pneumococcal vaccination: [id number]  No - Influenza vaccination for Fall 2023  N/A - Medication adherence over 70% - is on insulin  Yes - On statin or contraindication(s)  pravastatin (confirmed fill in 2023)  Yes - On ACEi/ARB or contraindication(s)  lisinopril (confirmed fill in 2023)      Formulary Medication Review:  Non-formulary or medications with cost-effective alternatives:  none identified.      Current medications eligible for copay waiver, up to $600, through [facility]:  - aspirin (with prescription), Lori Butts, lisinopril, pravastatin, Ozempic  - [employer] blood sugar testing supplies and generic pen needles      Diabetes Care:   Glycemic Goal: <7.0%. Is not at blood glucose goal. Type 2 DM f/b PCP. Current prescribed regimen Synjardy 12.5-1000 mg BID, Ozempic 2 mg once weekly, Tresiba 40 units qAM.  - Home blood sugar records: up a little, 200 this morning but after ate breakfast. AMFBG usually 180 before eating.  - Eye exam current (within one year): needs to reschedule, was sick for last visit so had to cancel  - Foot exam current (within one year):  says provider does at visits  - Appropriateness of Insulin Therapy: can further titrate basal insulin, may need to consider additional medication once reaches 50 units/day  - Medications/Classes already tried/failed: has been on glyburide in the past  - Daily aspirin?  Yes  - Medication compliance: compliant all of the time; may forget to take AM dose of insulin - encouraged use of alarm/reminder  - Current exercise: sits at desk 8 hours out of the day - edu provided    Other Considerations:  - Blood Pressure Goal: BP less than 130/80 mmHg due to history of DM: Is at blood pressure goal.   - Lipids: LDL has been under 70 mg/dL in the past on current regimen - repeat lipid panel.   - Smoking status: Smokes 1 PPD - has patches but has not started using; quit cold turkey once and was fine, but started smoking again eventually; encouraged to set quit date    PLAN:  - Consideration(s) for provider:   Charmaine Tinoco  - DM program gaps identified:   Initial requirements: Provider visit for diabetes (1st) and A1c (1st)   Ongoing requirements: Provider visit for diabetes (2nd), diabetes education/care coordination (if A1c becomes over 8%), A1c (2nd), Lipid panel, Urine microalbumin, Pneumococcal vaccination: [id number], and Influenza vaccination for [id number]   - Education to patient: Discussed foot care, Reminded to get yearly retinal exam, Addressed medication adherence, Benefit/indication for pneumonia vaccine in patients with diabetes, and Reminder A1c and lipid panel can be completed for free at [facility] screenings  - Follow up: PCP for identified gaps or as scheduled below  - Upcoming appointments:   Future Appointments   Date Time Provider Monster Sultana   1/26/2023 10:00 AM SCHEDULE, S CLINICAL PHARMACY S Clin Rx None   3/30/2023 11:15 AM NATALEE Larson CNP [address], PharmD, [facility]  Department, toll free: [phone], option 3     For Pharmacy Admin Tracking Only    Program: [address] in place:  No  Recommendation Provided To: Patient/Caregiver: 1 via Telephone  Intervention Accepted By: Patient/Caregiver: 1  Gap Closed?: Yes   Time Spent (min): 45

## 2023-03-15 DIAGNOSIS — N40.0 BENIGN PROSTATIC HYPERPLASIA WITHOUT LOWER URINARY TRACT SYMPTOMS: ICD-10-CM

## 2023-03-15 DIAGNOSIS — N52.9 ERECTILE DYSFUNCTION, UNSPECIFIED ERECTILE DYSFUNCTION TYPE: ICD-10-CM

## 2023-03-16 RX ORDER — TADALAFIL 5 MG/1
5 TABLET ORAL DAILY PRN
Qty: 90 TABLET | Refills: 1 | OUTPATIENT
Start: 2023-03-16

## 2023-03-16 RX ORDER — TADALAFIL 5 MG/1
5 TABLET ORAL DAILY PRN
Qty: 90 TABLET | Refills: 0 | Status: SHIPPED | OUTPATIENT
Start: 2023-03-16

## 2023-03-16 NOTE — TELEPHONE ENCOUNTER
Comments:     Last Office Visit (last PCP visit):   9/28/2022    Next Visit Date:  Future Appointments   Date Time Provider Monster Sultana   3/30/2023 11:15 AM NATALEE Mondragon CNP [facility] AT [place]       **If hasn't been seen in over a year OR hasn't followed up according to last diabetes/ADHD visit, make appointment for patient before sending refill to provider.     Rx requested:  Requested Prescriptions     Pending Prescriptions Disp Refills    tadalafil (CIALIS) 5 MG tablet [Pharmacy Med Name: tadalafil 5 mg tablet] 90 tablet 0     Sig: Take 1 tablet by mouth daily as needed for Erectile Dysfunction

## 2023-03-29 DIAGNOSIS — E78.5 HYPERLIPIDEMIA, UNSPECIFIED HYPERLIPIDEMIA TYPE: ICD-10-CM

## 2023-03-29 DIAGNOSIS — R53.83 OTHER FATIGUE: ICD-10-CM

## 2023-03-29 DIAGNOSIS — Z79.4 TYPE 2 DIABETES MELLITUS WITH MICROALBUMINURIA, WITH LONG-TERM CURRENT USE OF INSULIN (HCC): ICD-10-CM

## 2023-03-29 DIAGNOSIS — R80.9 TYPE 2 DIABETES MELLITUS WITH MICROALBUMINURIA, WITH LONG-TERM CURRENT USE OF INSULIN (HCC): ICD-10-CM

## 2023-03-29 DIAGNOSIS — E11.29 TYPE 2 DIABETES MELLITUS WITH MICROALBUMINURIA, WITH LONG-TERM CURRENT USE OF INSULIN (HCC): ICD-10-CM

## 2023-03-29 LAB
ALBUMIN SERPL-MCNC: 4.5 G/DL (ref 3.5–4.6)
ALP SERPL-CCNC: 72 U/L (ref 35–104)
ALT SERPL-CCNC: 21 U/L (ref 0–41)
ANION GAP SERPL CALCULATED.3IONS-SCNC: 15 MEQ/L (ref 9–15)
AST SERPL-CCNC: 19 U/L (ref 0–40)
BILIRUB SERPL-MCNC: <0.2 MG/DL (ref 0.2–0.7)
BUN SERPL-MCNC: 13 MG/DL (ref 6–20)
CALCIUM SERPL-MCNC: 9.5 MG/DL (ref 8.5–9.9)
CHLORIDE SERPL-SCNC: 100 MEQ/L (ref 95–107)
CHOLEST SERPL-MCNC: 129 MG/DL (ref 0–199)
CO2 SERPL-SCNC: 24 MEQ/L (ref 20–31)
CREAT SERPL-MCNC: 0.65 MG/DL (ref 0.7–1.2)
CREAT UR-MCNC: 96.7 MG/DL
GLOBULIN SER CALC-MCNC: 2.9 G/DL (ref 2.3–3.5)
GLUCOSE SERPL-MCNC: 112 MG/DL (ref 70–99)
HBA1C MFR BLD: 9.2 % (ref 4.8–5.9)
HDLC SERPL-MCNC: 33 MG/DL (ref 40–59)
LDLC SERPL CALC-MCNC: 52 MG/DL (ref 0–129)
MICROALBUMIN UR-MCNC: 16.7 MG/DL
MICROALBUMIN/CREAT UR-RTO: 172.7 MG/G (ref 0–30)
POTASSIUM SERPL-SCNC: 4.5 MEQ/L (ref 3.4–4.9)
PROT SERPL-MCNC: 7.4 G/DL (ref 6.3–8)
SODIUM SERPL-SCNC: 139 MEQ/L (ref 135–144)
TRIGL SERPL-MCNC: 218 MG/DL (ref 0–150)

## 2023-03-29 SDOH — ECONOMIC STABILITY: FOOD INSECURITY: WITHIN THE PAST 12 MONTHS, YOU WORRIED THAT YOUR FOOD WOULD RUN OUT BEFORE YOU GOT MONEY TO BUY MORE.: PATIENT DECLINED

## 2023-03-29 SDOH — ECONOMIC STABILITY: HOUSING INSECURITY
IN THE LAST 12 MONTHS, WAS THERE A TIME WHEN YOU DID NOT HAVE A STEADY PLACE TO SLEEP OR SLEPT IN A SHELTER (INCLUDING NOW)?: PATIENT REFUSED

## 2023-03-29 SDOH — ECONOMIC STABILITY: INCOME INSECURITY: HOW HARD IS IT FOR YOU TO PAY FOR THE VERY BASICS LIKE FOOD, HOUSING, MEDICAL CARE, AND HEATING?: PATIENT DECLINED

## 2023-03-29 SDOH — ECONOMIC STABILITY: FOOD INSECURITY: WITHIN THE PAST 12 MONTHS, THE FOOD YOU BOUGHT JUST DIDN'T LAST AND YOU DIDN'T HAVE MONEY TO GET MORE.: PATIENT DECLINED

## 2023-03-29 SDOH — ECONOMIC STABILITY: TRANSPORTATION INSECURITY
IN THE PAST 12 MONTHS, HAS LACK OF TRANSPORTATION KEPT YOU FROM MEETINGS, WORK, OR FROM GETTING THINGS NEEDED FOR DAILY LIVING?: PATIENT DECLINED

## 2023-03-30 ENCOUNTER — OFFICE VISIT (OUTPATIENT)
Dept: FAMILY MEDICINE CLINIC | Age: 58
End: 2023-03-30
Payer: COMMERCIAL

## 2023-03-30 VITALS
DIASTOLIC BLOOD PRESSURE: 62 MMHG | BODY MASS INDEX: 37.49 KG/M2 | WEIGHT: 225 LBS | HEIGHT: 65 IN | HEART RATE: 91 BPM | OXYGEN SATURATION: 94 % | SYSTOLIC BLOOD PRESSURE: 130 MMHG

## 2023-03-30 DIAGNOSIS — E66.01 SEVERE OBESITY (BMI 35.0-39.9) WITH COMORBIDITY (HCC): ICD-10-CM

## 2023-03-30 DIAGNOSIS — E11.69 ERECTILE DYSFUNCTION ASSOCIATED WITH TYPE 2 DIABETES MELLITUS (HCC): ICD-10-CM

## 2023-03-30 DIAGNOSIS — J44.9 CHRONIC OBSTRUCTIVE PULMONARY DISEASE, UNSPECIFIED COPD TYPE (HCC): Primary | ICD-10-CM

## 2023-03-30 DIAGNOSIS — Z79.4 TYPE 2 DIABETES MELLITUS WITH MICROALBUMINURIA, WITH LONG-TERM CURRENT USE OF INSULIN (HCC): ICD-10-CM

## 2023-03-30 DIAGNOSIS — E11.29 TYPE 2 DIABETES MELLITUS WITH MICROALBUMINURIA, WITH LONG-TERM CURRENT USE OF INSULIN (HCC): ICD-10-CM

## 2023-03-30 DIAGNOSIS — R80.9 TYPE 2 DIABETES MELLITUS WITH MICROALBUMINURIA, WITH LONG-TERM CURRENT USE OF INSULIN (HCC): ICD-10-CM

## 2023-03-30 DIAGNOSIS — N52.1 ERECTILE DYSFUNCTION ASSOCIATED WITH TYPE 2 DIABETES MELLITUS (HCC): ICD-10-CM

## 2023-03-30 DIAGNOSIS — E11.42 TYPE 2 DIABETES MELLITUS WITH PERIPHERAL NEUROPATHY (HCC): ICD-10-CM

## 2023-03-30 LAB
ANISOCYTOSIS BLD QL SMEAR: ABNORMAL
BASOPHILS # BLD: 0.1 K/UL (ref 0–0.2)
BASOPHILS NFR BLD: 1 %
EOSINOPHIL # BLD: 0.1 K/UL (ref 0–0.7)
EOSINOPHIL NFR BLD: 1 %
ERYTHROCYTE [DISTWIDTH] IN BLOOD BY AUTOMATED COUNT: 13.7 % (ref 11.5–14.5)
HCT VFR BLD AUTO: 52.5 % (ref 42–52)
HGB BLD-MCNC: 18.1 G/DL (ref 14–18)
LYMPHOCYTES # BLD: 3.5 K/UL (ref 1–4.8)
LYMPHOCYTES NFR BLD: 19 %
MCH RBC QN AUTO: 32.1 PG (ref 27–31.3)
MCHC RBC AUTO-ENTMCNC: 34.4 % (ref 33–37)
MCV RBC AUTO: 93.4 FL (ref 79–92.2)
MICROCYTES BLD QL SMEAR: ABNORMAL
MONOCYTES # BLD: 0.5 K/UL (ref 0.2–0.8)
MONOCYTES NFR BLD: 5.8 %
NEUTROPHILS # BLD: 4.8 K/UL (ref 1.4–6.5)
NEUTS SEG NFR BLD: 53 %
PATH INTERP BLD-IMP: NORMAL
PATH INTERP BLD-IMP: YES
PLATELET # BLD AUTO: 179 K/UL (ref 130–400)
PLATELET BLD QL SMEAR: NORMAL
POIKILOCYTOSIS BLD QL SMEAR: ABNORMAL
RBC # BLD AUTO: 5.62 M/UL (ref 4.7–6.1)
VARIANT LYMPHS NFR BLD: 20 %
WBC # BLD AUTO: 9 K/UL (ref 4.8–10.8)

## 2023-03-30 PROCEDURE — 3046F HEMOGLOBIN A1C LEVEL >9.0%: CPT | Performed by: NURSE PRACTITIONER

## 2023-03-30 PROCEDURE — 99214 OFFICE O/P EST MOD 30 MIN: CPT | Performed by: NURSE PRACTITIONER

## 2023-03-30 RX ORDER — GABAPENTIN 600 MG/1
600 TABLET ORAL 3 TIMES DAILY
Qty: 270 TABLET | Refills: 0 | Status: SHIPPED | OUTPATIENT
Start: 2023-03-30 | End: 2023-06-28

## 2023-03-30 RX ORDER — BUDESONIDE AND FORMOTEROL FUMARATE DIHYDRATE 160; 4.5 UG/1; UG/1
2 AEROSOL RESPIRATORY (INHALATION) 2 TIMES DAILY
Qty: 3 EACH | Refills: 1 | Status: SHIPPED | OUTPATIENT
Start: 2023-03-30

## 2023-03-30 ASSESSMENT — PATIENT HEALTH QUESTIONNAIRE - PHQ9
4. FEELING TIRED OR HAVING LITTLE ENERGY: 0
10. IF YOU CHECKED OFF ANY PROBLEMS, HOW DIFFICULT HAVE THESE PROBLEMS MADE IT FOR YOU TO DO YOUR WORK, TAKE CARE OF THINGS AT HOME, OR GET ALONG WITH OTHER PEOPLE: 0
SUM OF ALL RESPONSES TO PHQ QUESTIONS 1-9: 0
SUM OF ALL RESPONSES TO PHQ QUESTIONS 1-9: 0
1. LITTLE INTEREST OR PLEASURE IN DOING THINGS: 0
SUM OF ALL RESPONSES TO PHQ9 QUESTIONS 1 & 2: 0
5. POOR APPETITE OR OVEREATING: 0
7. TROUBLE CONCENTRATING ON THINGS, SUCH AS READING THE NEWSPAPER OR WATCHING TELEVISION: 0
2. FEELING DOWN, DEPRESSED OR HOPELESS: 0
3. TROUBLE FALLING OR STAYING ASLEEP: 0
SUM OF ALL RESPONSES TO PHQ QUESTIONS 1-9: 0
8. MOVING OR SPEAKING SO SLOWLY THAT OTHER PEOPLE COULD HAVE NOTICED. OR THE OPPOSITE, BEING SO FIGETY OR RESTLESS THAT YOU HAVE BEEN MOVING AROUND A LOT MORE THAN USUAL: 0
9. THOUGHTS THAT YOU WOULD BE BETTER OFF DEAD, OR OF HURTING YOURSELF: 0
6. FEELING BAD ABOUT YOURSELF - OR THAT YOU ARE A FAILURE OR HAVE LET YOURSELF OR YOUR FAMILY DOWN: 0
SUM OF ALL RESPONSES TO PHQ QUESTIONS 1-9: 0

## 2023-03-30 ASSESSMENT — ENCOUNTER SYMPTOMS
SHORTNESS OF BREATH: 1
COUGH: 0

## 2023-03-30 NOTE — PROGRESS NOTES
Subjective  Chief Complaint   Patient presents with    Diabetes     6 month follow up   Discuss lab results       HPI    Pt is here for a fu of his most recent labs. A1c has gone up since last blood work  He stats that he admits to eating fairly bad for the past month. Also, he has been forgetting to take his tresiba. Takes it about half the days of the week. He states that his neuropathy is stable. No new symptoms. Microalbumin worse as well. Pt does acknowledge breathing is not great. Gets out of breath at times. Is still smoking. Has the patches. States that he has not decided mentally that he was ready yet. Lab Results   Component Value Date    WBC 9.0 03/29/2023    HGB 18.1 (H) 03/29/2023    HCT 52.5 (H) 03/29/2023     03/29/2023    CHOL 129 03/29/2023    TRIG 218 (H) 03/29/2023    HDL 33 (L) 03/29/2023    LDLDIRECT 91 08/27/2014    ALT 21 03/29/2023    AST 19 03/29/2023     03/29/2023    K 4.5 03/29/2023     03/29/2023    CREATININE 0.65 (L) 03/29/2023    BUN 13 03/29/2023    CO2 24 03/29/2023    TSH 1.540 02/11/2019    PSA 0.76 03/28/2022    GLUF 271 (H) 08/27/2014    LABA1C 9.2 (H) 03/29/2023    LABMICR 16.70 (H) 03/29/2023         Patient Active Problem List    Diagnosis Date Noted    Type 2 diabetes mellitus with microalbuminuria ([address] 09/26/2014    Encounter for long-term (current) insulin use ([address] 09/26/2014    Hyperlipidemia 09/26/2014    Severe obesity (BMI 35.0-39. 9) with comorbidity ([address] 03/30/2023    Left leg paresthesias 02/11/2019    Erectile dysfunction associated with type 2 diabetes mellitus ([address] 02/09/2018    Tear of right retina without detachment 02/14/2014    Type 2 diabetes mellitus with peripheral neuropathy ([address] 04/10/2013    Periodic limb movement disorder 03/08/2009    Obstructive sleep apnea 03/08/2009    History of peptic ulcer disease 06/01/1995     Past Medical History:   Diagnosis Date    Diabetes mellitus ([facility] [address]      No past surgical

## 2023-04-07 ENCOUNTER — TELEPHONE (OUTPATIENT)
Dept: FAMILY MEDICINE CLINIC | Age: 58
End: 2023-04-07

## 2023-04-11 DIAGNOSIS — R80.9 TYPE 2 DIABETES MELLITUS WITH MICROALBUMINURIA, WITH LONG-TERM CURRENT USE OF INSULIN (HCC): Primary | Chronic | ICD-10-CM

## 2023-04-11 DIAGNOSIS — Z79.4 TYPE 2 DIABETES MELLITUS WITH MICROALBUMINURIA, WITH LONG-TERM CURRENT USE OF INSULIN (HCC): Primary | Chronic | ICD-10-CM

## 2023-04-11 DIAGNOSIS — E11.29 TYPE 2 DIABETES MELLITUS WITH MICROALBUMINURIA, WITH LONG-TERM CURRENT USE OF INSULIN (HCC): Primary | Chronic | ICD-10-CM

## 2023-04-11 RX ORDER — BLOOD-GLUCOSE SENSOR
1 EACH MISCELLANEOUS
Qty: 9 EACH | Refills: 2 | Status: SHIPPED | OUTPATIENT
Start: 2023-04-11 | End: 2023-07-10

## 2023-04-11 RX ORDER — BLOOD-GLUCOSE,RECEIVER,CONT
1 EACH MISCELLANEOUS DAILY
Qty: 1 EACH | Refills: 0 | Status: SHIPPED | OUTPATIENT
Start: 2023-04-11

## 2023-04-18 RX ORDER — BLOOD SUGAR DIAGNOSTIC
STRIP MISCELLANEOUS
Qty: 100 EACH | Refills: 3 | Status: SHIPPED | OUTPATIENT
Start: 2023-04-18

## 2023-04-18 NOTE — TELEPHONE ENCOUNTER
Comments:     Last Office Visit (last PCP visit):   3/30/2023    Next Visit Date:  Future Appointments   Date Time Provider Monster Sultana   7/3/2023 12:30 PM NATALEE Briggs CNP [facility] AT [place]       **If hasn't been seen in over a year OR hasn't followed up according to last diabetes/ADHD visit, make appointment for patient before sending refill to provider.     Rx requested:  Requested Prescriptions     Pending Prescriptions Disp Refills    blood glucose test strips (PRODIGY NO CODING BLOOD GLUC) strip 100 each 3     Sig: Use to test once daily and as needed as directed by provider

## 2023-04-18 NOTE — TELEPHONE ENCOUNTER
Comments:     Last Office Visit (last PCP visit):   3/30/2023    Next Visit Date:  Future Appointments   Date Time Provider Monster Sultana   7/3/2023 12:30 PM NATALEE Britton - CNP [facility] AT [place]       **If hasn't been seen in over a year OR hasn't followed up according to last diabetes/ADHD visit, make appointment for patient before sending refill to provider.     Rx requested:  Requested Prescriptions     Pending Prescriptions Disp Refills    Semaglutide, 2 MG/DOSE, 8 MG/3ML SOPN 9 mL 1     Sig: Inject 2 mg into the skin once a week

## 2023-04-20 RX ORDER — ASPIRIN 81 MG/1
81 TABLET ORAL DAILY
Qty: 90 TABLET | Refills: 0 | Status: SHIPPED | OUTPATIENT
Start: 2023-04-20

## 2023-04-20 NOTE — TELEPHONE ENCOUNTER
Comments:     Last Office Visit (last PCP visit):   3/30/2023    Next Visit Date:  Future Appointments   Date Time Provider Monster Sultana   7/3/2023 12:30 PM NATALEE Baum CNP [facility] AT [place]       **If hasn't been seen in over a year OR hasn't followed up according to last diabetes/ADHD visit, make appointment for patient before sending refill to provider.     Rx requested:  Requested Prescriptions     Pending Prescriptions Disp Refills    aspirin EC 81 MG EC tablet 90 tablet 0     Sig: Take 1 tablet by mouth daily

## 2023-04-25 ENCOUNTER — TELEPHONE (OUTPATIENT)
Dept: GASTROENTEROLOGY | Age: 58
End: 2023-04-25

## 2023-05-26 DIAGNOSIS — R80.9 TYPE 2 DIABETES MELLITUS WITH MICROALBUMINURIA, WITH LONG-TERM CURRENT USE OF INSULIN (HCC): ICD-10-CM

## 2023-05-26 DIAGNOSIS — E11.42 TYPE 2 DIABETES MELLITUS WITH PERIPHERAL NEUROPATHY (HCC): Chronic | ICD-10-CM

## 2023-05-26 DIAGNOSIS — Z79.4 TYPE 2 DIABETES MELLITUS WITH MICROALBUMINURIA, WITH LONG-TERM CURRENT USE OF INSULIN (HCC): ICD-10-CM

## 2023-05-26 DIAGNOSIS — E11.29 TYPE 2 DIABETES MELLITUS WITH MICROALBUMINURIA, WITH LONG-TERM CURRENT USE OF INSULIN (HCC): ICD-10-CM

## 2023-05-26 DIAGNOSIS — F32.A DEPRESSION, UNSPECIFIED DEPRESSION TYPE: ICD-10-CM

## 2023-05-26 NOTE — TELEPHONE ENCOUNTER
Comments:     Last Office Visit (last PCP visit):   3/30/2023    Next Visit Date:  Future Appointments   Date Time Provider Monster Sultana   7/3/2023 12:30 PM NATALEE Irizarry CNP [facility] AT [place]       **If hasn't been seen in over a year OR hasn't followed up according to last diabetes/ADHD visit, make appointment for patient before sending refill to provider.     Rx requested:  Requested Prescriptions     Pending Prescriptions Disp Refills    Insulin Degludec (TRESIBA FLEXTOUCH) 100 UNIT/ML SOPN 3 Adjustable Dose Pre-filled Pen Syringe 3     Si units each    pravastatin (PRAVACHOL) 40 MG tablet 90 tablet 1     Sig: TAKE ONE TABLET BY MOUTH ONE TIME A DAY AT BEDTIME FOR CHOLESTEROL    Empagliflozin-metFORMIN HCl (SYNJARDY) 12.5-1000 MG TABS 180 tablet 1     Sig: Take 1 tablet by mouth 2 times daily (with meals)    escitalopram (LEXAPRO) 10 MG tablet 90 tablet 1     Sig: Take 1 tablet by mouth daily    omeprazole (PRILOSEC) 20 MG delayed release capsule 90 capsule 1     Sig: Take 1 capsule by mouth every other day    Semaglutide, 2 MG/DOSE, 8 MG/3ML SOPN 9 mL 1     Sig: Inject 2 mg into the skin once a week

## 2023-05-28 RX ORDER — OMEPRAZOLE 20 MG/1
20 CAPSULE, DELAYED RELEASE ORAL EVERY OTHER DAY
Qty: 90 CAPSULE | Refills: 1 | Status: SHIPPED | OUTPATIENT
Start: 2023-05-28

## 2023-05-28 RX ORDER — INSULIN DEGLUDEC INJECTION 100 U/ML
INJECTION, SOLUTION SUBCUTANEOUS
Qty: 3 ADJUSTABLE DOSE PRE-FILLED PEN SYRINGE | Refills: 3 | Status: SHIPPED | OUTPATIENT
Start: 2023-05-28

## 2023-05-28 RX ORDER — PRAVASTATIN SODIUM 40 MG
TABLET ORAL
Qty: 90 TABLET | Refills: 1 | Status: SHIPPED | OUTPATIENT
Start: 2023-05-28

## 2023-05-28 RX ORDER — EMPAGLIFLOZIN AND METFORMIN HYDROCHLORIDE 12.5; 1 MG/1; MG/1
1 TABLET ORAL 2 TIMES DAILY WITH MEALS
Qty: 180 TABLET | Refills: 1 | Status: SHIPPED | OUTPATIENT
Start: 2023-05-28

## 2023-05-28 RX ORDER — ESCITALOPRAM OXALATE 10 MG/1
10 TABLET ORAL DAILY
Qty: 90 TABLET | Refills: 1 | Status: SHIPPED | OUTPATIENT
Start: 2023-05-28

## 2023-05-30 RX ORDER — OMEPRAZOLE 20 MG/1
20 CAPSULE, DELAYED RELEASE ORAL EVERY OTHER DAY
Qty: 90 CAPSULE | Refills: 1 | OUTPATIENT
Start: 2023-05-30

## 2023-05-30 NOTE — TELEPHONE ENCOUNTER
Comments:     Last Office Visit (last PCP visit):   3/30/2023    Next Visit Date:  Future Appointments   Date Time Provider Monster Sultana   7/3/2023 12:30 PM NATALEE Rossi CNP [facility] AT [place]       **If hasn't been seen in over a year OR hasn't followed up according to last diabetes/ADHD visit, make appointment for patient before sending refill to provider.     Rx requested:  Requested Prescriptions     Pending Prescriptions Disp Refills    omeprazole (PRILOSEC) 20 MG delayed release capsule 90 capsule 1     Sig: Take 1 capsule by mouth every other day

## 2023-06-26 ENCOUNTER — PATIENT MESSAGE (OUTPATIENT)
Dept: FAMILY MEDICINE CLINIC | Age: 58
End: 2023-06-26

## 2023-07-03 ENCOUNTER — OFFICE VISIT (OUTPATIENT)
Dept: FAMILY MEDICINE CLINIC | Age: 58
End: 2023-07-03
Payer: COMMERCIAL

## 2023-07-03 VITALS
OXYGEN SATURATION: 96 % | HEART RATE: 82 BPM | HEIGHT: 65 IN | SYSTOLIC BLOOD PRESSURE: 132 MMHG | DIASTOLIC BLOOD PRESSURE: 74 MMHG | WEIGHT: 229 LBS | BODY MASS INDEX: 38.15 KG/M2

## 2023-07-03 DIAGNOSIS — R80.9 TYPE 2 DIABETES MELLITUS WITH MICROALBUMINURIA, WITH LONG-TERM CURRENT USE OF INSULIN (HCC): Primary | ICD-10-CM

## 2023-07-03 DIAGNOSIS — Z79.4 TYPE 2 DIABETES MELLITUS WITH MICROALBUMINURIA, WITH LONG-TERM CURRENT USE OF INSULIN (HCC): Primary | ICD-10-CM

## 2023-07-03 DIAGNOSIS — Z23 NEED FOR PNEUMOCOCCAL VACCINATION: ICD-10-CM

## 2023-07-03 DIAGNOSIS — E11.29 TYPE 2 DIABETES MELLITUS WITH MICROALBUMINURIA, WITH LONG-TERM CURRENT USE OF INSULIN (HCC): Primary | ICD-10-CM

## 2023-07-03 DIAGNOSIS — E11.42 TYPE 2 DIABETES MELLITUS WITH PERIPHERAL NEUROPATHY (HCC): ICD-10-CM

## 2023-07-03 LAB — HBA1C MFR BLD: 6.3 %

## 2023-07-03 PROCEDURE — 83037 HB GLYCOSYLATED A1C HOME DEV: CPT | Performed by: NURSE PRACTITIONER

## 2023-07-03 PROCEDURE — 3046F HEMOGLOBIN A1C LEVEL >9.0%: CPT | Performed by: NURSE PRACTITIONER

## 2023-07-03 PROCEDURE — 99213 OFFICE O/P EST LOW 20 MIN: CPT | Performed by: NURSE PRACTITIONER

## 2023-07-03 PROCEDURE — 90677 PCV20 VACCINE IM: CPT | Performed by: NURSE PRACTITIONER

## 2023-07-03 PROCEDURE — 90471 IMMUNIZATION ADMIN: CPT | Performed by: NURSE PRACTITIONER

## 2023-07-03 ASSESSMENT — ENCOUNTER SYMPTOMS
SHORTNESS OF BREATH: 0
DIARRHEA: 0
CONSTIPATION: 0
COUGH: 0

## 2023-07-03 NOTE — PROGRESS NOTES
Subjective  Chief Complaint   Patient presents with    Diabetes     3 month follow up       HPI    Pt overall feeling well. Diabetes follow up. Last A1c was 9%. Blood sugar coming down. Fasting bs was around 100. Bert Maw at night has been helping    Tolerating ozempic ok. No symptoms of hypo/hyperglycemia. Patient Active Problem List    Diagnosis Date Noted    Type 2 diabetes mellitus with microalbuminuria ([address]) 09/26/2014    Encounter for long-term (current) insulin use ([address]) 09/26/2014    Hyperlipidemia 09/26/2014    Severe obesity (BMI 35.0-39. 9) with comorbidity ([address]) 03/30/2023    Chronic obstructive pulmonary disease ([address]) 03/30/2023    Left leg paresthesias 02/11/2019    Erectile dysfunction associated with type 2 diabetes mellitus ([address]) 02/09/2018    Tear of right retina without detachment 02/14/2014    Type 2 diabetes mellitus with peripheral neuropathy ([address]) 04/10/2013    Periodic limb movement disorder 03/08/2009    Obstructive sleep apnea 03/08/2009    History of peptic ulcer disease 06/01/1995     Past Medical History:   Diagnosis Date    Diabetes mellitus ([address])      No past surgical history on file.   Family History   Problem Relation Age of Onset    High Blood Pressure Mother     Diabetes Father     Cancer Paternal Uncle     Diabetes Paternal Grandmother     Breast Cancer Neg Hx     Colon Cancer Neg Hx     Heart Attack Neg Hx     High Cholesterol Neg Hx     Stroke Neg Hx      Social History     Socioeconomic History    Marital status: Single     Spouse name: None    Number of children: None    Years of education: None    Highest education level: None   Tobacco Use    Smoking status: Every Day     Packs/day: 1.00     Years: 30.00     Pack years: 30.00     Types: Cigarettes     Start date: 9/25/1989    Smokeless tobacco: Never   Substance and Sexual Activity    Alcohol use: No     Alcohol/week: 0.0 standard drinks    Drug use: No     Current Outpatient Medications on File Prior to

## 2023-07-03 NOTE — PROGRESS NOTES
After obtaining consent, and per orders of Yovanny Saunders CNP, injection of Pneumococcal 0.5 mL, given in Left deltoid by Nicole Michaels MA. Patient instructed to remain in clinic for 20 minutes afterwards, and to report any adverse reaction to me immediately. Pt tolerated well.

## 2023-08-16 DIAGNOSIS — Z79.4 TYPE 2 DIABETES MELLITUS WITH MICROALBUMINURIA, WITH LONG-TERM CURRENT USE OF INSULIN (HCC): ICD-10-CM

## 2023-08-16 DIAGNOSIS — R80.9 TYPE 2 DIABETES MELLITUS WITH MICROALBUMINURIA, WITH LONG-TERM CURRENT USE OF INSULIN (HCC): ICD-10-CM

## 2023-08-16 DIAGNOSIS — E11.29 TYPE 2 DIABETES MELLITUS WITH MICROALBUMINURIA, WITH LONG-TERM CURRENT USE OF INSULIN (HCC): ICD-10-CM

## 2023-08-16 RX ORDER — INSULIN DEGLUDEC INJECTION 100 U/ML
INJECTION, SOLUTION SUBCUTANEOUS
Qty: 3 ADJUSTABLE DOSE PRE-FILLED PEN SYRINGE | Refills: 3 | Status: SHIPPED | OUTPATIENT
Start: 2023-08-16

## 2023-08-18 ENCOUNTER — TELEPHONE (OUTPATIENT)
Dept: FAMILY MEDICINE CLINIC | Age: 58
End: 2023-08-18

## 2023-08-18 NOTE — TELEPHONE ENCOUNTER
Please call Sherryle Halls at [address] [place] [address] back at [phone] in regards to the insulin prescription option 1 then option 0. Still in PT.  Slowly progressing.  Back to work since 1/7/20.

## 2023-08-22 ENCOUNTER — TELEPHONE (OUTPATIENT)
Dept: FAMILY MEDICINE CLINIC | Age: 58
End: 2023-08-22

## 2023-09-04 DIAGNOSIS — N52.9 ERECTILE DYSFUNCTION, UNSPECIFIED ERECTILE DYSFUNCTION TYPE: ICD-10-CM

## 2023-09-04 DIAGNOSIS — N40.0 BENIGN PROSTATIC HYPERPLASIA WITHOUT LOWER URINARY TRACT SYMPTOMS: ICD-10-CM

## 2023-09-04 DIAGNOSIS — J40 BRONCHITIS: ICD-10-CM

## 2023-09-04 DIAGNOSIS — E11.42 TYPE 2 DIABETES MELLITUS WITH PERIPHERAL NEUROPATHY (HCC): Chronic | ICD-10-CM

## 2023-09-04 DIAGNOSIS — F32.A DEPRESSION, UNSPECIFIED DEPRESSION TYPE: ICD-10-CM

## 2023-09-05 ENCOUNTER — OFFICE VISIT (OUTPATIENT)
Dept: FAMILY MEDICINE CLINIC | Age: 58
End: 2023-09-05
Payer: COMMERCIAL

## 2023-09-05 VITALS
HEART RATE: 86 BPM | BODY MASS INDEX: 39.15 KG/M2 | DIASTOLIC BLOOD PRESSURE: 88 MMHG | WEIGHT: 235 LBS | OXYGEN SATURATION: 96 % | SYSTOLIC BLOOD PRESSURE: 138 MMHG | HEIGHT: 65 IN

## 2023-09-05 DIAGNOSIS — Z12.11 COLON CANCER SCREENING: ICD-10-CM

## 2023-09-05 DIAGNOSIS — R53.83 OTHER FATIGUE: ICD-10-CM

## 2023-09-05 DIAGNOSIS — Z87.891 PERSONAL HISTORY OF TOBACCO USE: ICD-10-CM

## 2023-09-05 DIAGNOSIS — E11.42 TYPE 2 DIABETES MELLITUS WITH PERIPHERAL NEUROPATHY (HCC): Primary | ICD-10-CM

## 2023-09-05 DIAGNOSIS — Z12.5 SCREENING PSA (PROSTATE SPECIFIC ANTIGEN): ICD-10-CM

## 2023-09-05 DIAGNOSIS — E78.5 HYPERLIPIDEMIA, UNSPECIFIED HYPERLIPIDEMIA TYPE: ICD-10-CM

## 2023-09-05 PROCEDURE — G0296 VISIT TO DETERM LDCT ELIG: HCPCS | Performed by: NURSE PRACTITIONER

## 2023-09-05 PROCEDURE — 3044F HG A1C LEVEL LT 7.0%: CPT | Performed by: NURSE PRACTITIONER

## 2023-09-05 PROCEDURE — 99214 OFFICE O/P EST MOD 30 MIN: CPT | Performed by: NURSE PRACTITIONER

## 2023-09-05 RX ORDER — LISINOPRIL 10 MG/1
TABLET ORAL
Qty: 90 TABLET | Refills: 1 | Status: SHIPPED | OUTPATIENT
Start: 2023-09-05

## 2023-09-05 RX ORDER — ALBUTEROL SULFATE 90 UG/1
2 AEROSOL, METERED RESPIRATORY (INHALATION) 4 TIMES DAILY PRN
Qty: 3 EACH | Refills: 0 | Status: SHIPPED | OUTPATIENT
Start: 2023-09-05

## 2023-09-05 RX ORDER — PRAVASTATIN SODIUM 40 MG
TABLET ORAL
Qty: 90 TABLET | Refills: 1 | Status: SHIPPED | OUTPATIENT
Start: 2023-09-05

## 2023-09-05 RX ORDER — ESCITALOPRAM OXALATE 10 MG/1
10 TABLET ORAL DAILY
Qty: 90 TABLET | Refills: 1 | Status: SHIPPED | OUTPATIENT
Start: 2023-09-05

## 2023-09-05 RX ORDER — TADALAFIL 5 MG/1
5 TABLET ORAL DAILY PRN
Qty: 90 TABLET | Refills: 0 | Status: SHIPPED | OUTPATIENT
Start: 2023-09-05 | End: 2023-09-06 | Stop reason: SDUPTHER

## 2023-09-05 ASSESSMENT — ENCOUNTER SYMPTOMS
COUGH: 0
DIARRHEA: 0
SHORTNESS OF BREATH: 0
CONSTIPATION: 0

## 2023-09-05 NOTE — PROGRESS NOTES
Alcohol use: No     Alcohol/week: 0.0 standard drinks    Drug use: No     Current Outpatient Medications on File Prior to Visit   Medication Sig Dispense Refill    albuterol sulfate HFA (VENTOLIN HFA) 108 (90 Base) MCG/ACT inhaler Inhale 2 puffs into the lungs 4 times daily as needed for Wheezing 3 each 0    lisinopril (PRINIVIL;ZESTRIL) 10 MG tablet TAKE 1 TABLET BY MOUTH ONE TIME DAILY 90 tablet 1    pravastatin (PRAVACHOL) 40 MG tablet TAKE ONE TABLET BY MOUTH ONE TIME A DAY AT BEDTIME FOR CHOLESTEROL 90 tablet 1    escitalopram (LEXAPRO) 10 MG tablet Take 1 tablet by mouth daily 90 tablet 1    tadalafil (CIALIS) 5 MG tablet Take 1 tablet by mouth daily as needed for Erectile Dysfunction 90 tablet 0    Insulin Degludec (TRESIBA FLEXTOUCH) 100 UNIT/ML SOPN 40 units each 3 Adjustable Dose Pre-filled Pen Syringe 3    aspirin EC 81 MG EC tablet Take 1 tablet by mouth daily 90 tablet 0    Insulin Pen Needle (NOVOFINE PLUS) 32G X 4 MM MISC 1 each by Does not apply route daily 200 each 1    Empagliflozin-metFORMIN HCl (SYNJARDY) 12.5-1000 MG TABS Take 1 tablet by mouth 2 times daily (with meals) 180 tablet 1    omeprazole (PRILOSEC) 20 MG delayed release capsule Take 1 capsule by mouth every other day 90 capsule 1    Semaglutide, 2 MG/DOSE, 8 MG/3ML SOPN Inject 2 mg into the skin once a week 9 mL 1    blood glucose test strips (PRODIGY NO CODING BLOOD GLUC) strip Use to test once daily and as needed as directed by provider 100 each 3    Continuous Blood Gluc  (DEXCOM G7 ) MEÑO 1 Device by Does not apply route daily 1 each 0    gabapentin (NEURONTIN) 600 MG tablet Take 1 tablet by mouth 3 times daily for 90 days.  270 tablet 0    budesonide-formoterol (SYMBICORT) 160-4.5 MCG/ACT AERO Inhale 2 puffs into the lungs 2 times daily 3 each 1    Alcohol Swabs PADS Use to test blood sugars once a day as directed 100 each 3    nicotine (NICODERM CQ) 21 MG/24HR Apply 1 patch to skin daily weeks 1-6, locate to upper

## 2023-09-06 RX ORDER — TADALAFIL 5 MG/1
5 TABLET ORAL DAILY PRN
Qty: 90 TABLET | Refills: 0 | Status: SHIPPED | OUTPATIENT
Start: 2023-09-06

## 2023-09-06 NOTE — TELEPHONE ENCOUNTER
tadalafil (CIALIS) 5 MG tablet Titus Bro, APRN - CNP]       Patient Comment: Will  at [facility] in [facility], [address] [facility]

## 2023-09-06 NOTE — TELEPHONE ENCOUNTER
Future Appointments    Encounter Information    Provider Department Appt Notes   10/16/2023 [address] [facility] CT Scan prep to pt/CT/epic office [facility]   12/5/2023 NATALEE Kunz CNP [facility] [place], operated by [facility] 3 month follow up     Past Visits    Date Provider Specialty Visit Type Primary Dx   09/05/2023 NATALEE Kunz CNP Family Medicine Office Visit Type 2 diabetes mellitus with peripheral neuropathy ([address])   07/03/2023 NATALEE Kunz CNP Family Medicine Office Visit Type 2 diabetes mellitus with microalbuminuria, with long-term current use of insulin ([address])   03/30/2023 NATALEE Kunz CNP Family Medicine Office Visit Chronic obstructive pulmonary disease, unspecified COPD type ([address])   09/28/2022 NATALEE Kunz CNP Family Medicine Office Visit Well adult exam   03/28/2022 NATALEE Kunz CNP Family Medicine Office Visit Type 2 diabetes mellitus with microalbuminuria, with long-term current use of insulin ([address])

## 2023-10-12 ENCOUNTER — PATIENT MESSAGE (OUTPATIENT)
Dept: FAMILY MEDICINE CLINIC | Age: 58
End: 2023-10-12

## 2023-10-12 NOTE — TELEPHONE ENCOUNTER
From: Nacho Hernandez  To: [place] Godwin  Sent: 10/12/2023 2:58 PM EDT  Subject: Blood Draw    Pastora Johnson, I plan to get my blood drawn on Monday (Yes, I completely forgot, but didn't want to lead with that. ..). Are my orders still active?     Thank You,    Norm

## 2023-10-16 ENCOUNTER — NURSE ONLY (OUTPATIENT)
Dept: PRIMARY CARE CLINIC | Age: 58
End: 2023-10-16
Payer: COMMERCIAL

## 2023-10-16 ENCOUNTER — HOSPITAL ENCOUNTER (OUTPATIENT)
Dept: CT IMAGING | Age: 58
Discharge: HOME | End: 2023-10-18
Payer: COMMERCIAL

## 2023-10-16 DIAGNOSIS — Z23 NEED FOR INFLUENZA VACCINATION: Primary | ICD-10-CM

## 2023-10-16 DIAGNOSIS — Z87.891 PERSONAL HISTORY OF TOBACCO USE: ICD-10-CM

## 2023-10-16 PROCEDURE — 71271 CT THORAX LUNG CANCER SCR C-: CPT

## 2023-10-16 PROCEDURE — 90674 CCIIV4 VAC NO PRSV 0.5 ML IM: CPT | Performed by: INTERNAL MEDICINE

## 2023-10-16 PROCEDURE — 90471 IMMUNIZATION ADMIN: CPT | Performed by: INTERNAL MEDICINE

## 2023-10-23 ENCOUNTER — TELEPHONE (OUTPATIENT)
Dept: PHARMACY | Facility: CLINIC | Age: 58
End: 2023-10-23

## 2023-10-23 NOTE — TELEPHONE ENCOUNTER
As of 10/05/23 patient has used $586 of the maximum of $600 a year in waived co pays for specific medications and pharmacy-related supplies through the [address] [employer] for the DM Program.    Patient currently enrolled in the DM Program and is nearing the maximum of $600 a year in waived co pays for specific medications and pharmacy-related supplies through the [address] [employer]. Courtesy call placed to patient at home/mobile number to advise them of the above information. Spoke to patient and advised him of the above information. Patient verified understanding.     Pablito Martinez   Department, toll free: [phone] Option #3    For Pharmacy Admin Tracking Only    Program: Melisa in place:  No  Gap Closed?: Yes   Time Spent (min): 5

## 2023-11-07 DIAGNOSIS — R80.9 TYPE 2 DIABETES MELLITUS WITH MICROALBUMINURIA, WITH LONG-TERM CURRENT USE OF INSULIN (HCC): ICD-10-CM

## 2023-11-07 DIAGNOSIS — Z79.4 TYPE 2 DIABETES MELLITUS WITH MICROALBUMINURIA, WITH LONG-TERM CURRENT USE OF INSULIN (HCC): ICD-10-CM

## 2023-11-07 DIAGNOSIS — E11.29 TYPE 2 DIABETES MELLITUS WITH MICROALBUMINURIA, WITH LONG-TERM CURRENT USE OF INSULIN (HCC): ICD-10-CM

## 2023-11-07 NOTE — TELEPHONE ENCOUNTER
Last Office Visit (last PCP visit):   2023    Next Visit Date:  Future Appointments   Date Time Provider [address]   2023  1:45 PM NATALEE Singh CNP [facility] AT [place]       **If hasn't been seen in over a year OR hasn't followed up according to last diabetes/ADHD visit, make appointment for patient before sending refill to provider.     Rx requested:  Requested Prescriptions     Pending Prescriptions Disp Refills    Semaglutide, 2 MG/DOSE, 8 MG/3ML SOPN 9 mL 1     Sig: Inject 2 mg into the skin once a week    Insulin Degludec (TRESIBA FLEXTOUCH) 100 UNIT/ML SOPN 3 Adjustable Dose Pre-filled Pen Syringe 3     Si units each

## 2023-11-08 RX ORDER — INSULIN DEGLUDEC INJECTION 100 U/ML
INJECTION, SOLUTION SUBCUTANEOUS
Qty: 3 ADJUSTABLE DOSE PRE-FILLED PEN SYRINGE | Refills: 3 | Status: SHIPPED | OUTPATIENT
Start: 2023-11-08

## 2023-11-10 NOTE — TELEPHONE ENCOUNTER
[facility] calling about tresiba they want to know how many times a day should he inject himself with the pen and everything else it is not on the script needs clarification     Please call back at [phone] ask for anyone they should be able to help.

## 2023-12-05 ENCOUNTER — OFFICE VISIT (OUTPATIENT)
Dept: FAMILY MEDICINE CLINIC | Age: 58
End: 2023-12-05
Payer: COMMERCIAL

## 2023-12-05 VITALS
BODY MASS INDEX: 39.65 KG/M2 | DIASTOLIC BLOOD PRESSURE: 68 MMHG | WEIGHT: 238 LBS | HEIGHT: 65 IN | SYSTOLIC BLOOD PRESSURE: 138 MMHG | HEART RATE: 91 BPM | OXYGEN SATURATION: 96 %

## 2023-12-05 DIAGNOSIS — E78.5 HYPERLIPIDEMIA, UNSPECIFIED HYPERLIPIDEMIA TYPE: ICD-10-CM

## 2023-12-05 DIAGNOSIS — R80.9 TYPE 2 DIABETES MELLITUS WITH MICROALBUMINURIA, WITH LONG-TERM CURRENT USE OF INSULIN (HCC): ICD-10-CM

## 2023-12-05 DIAGNOSIS — Z12.11 COLON CANCER SCREENING: ICD-10-CM

## 2023-12-05 DIAGNOSIS — Z79.4 TYPE 2 DIABETES MELLITUS WITH MICROALBUMINURIA, WITH LONG-TERM CURRENT USE OF INSULIN (HCC): ICD-10-CM

## 2023-12-05 DIAGNOSIS — F17.200 SMOKER: ICD-10-CM

## 2023-12-05 DIAGNOSIS — E11.29 TYPE 2 DIABETES MELLITUS WITH MICROALBUMINURIA, WITH LONG-TERM CURRENT USE OF INSULIN (HCC): ICD-10-CM

## 2023-12-05 DIAGNOSIS — R00.2 PALPITATIONS: Primary | ICD-10-CM

## 2023-12-05 PROCEDURE — 99214 OFFICE O/P EST MOD 30 MIN: CPT | Performed by: NURSE PRACTITIONER

## 2023-12-05 PROCEDURE — 3044F HG A1C LEVEL LT 7.0%: CPT | Performed by: NURSE PRACTITIONER

## 2023-12-05 ASSESSMENT — ENCOUNTER SYMPTOMS
CONSTIPATION: 0
COUGH: 1
DIARRHEA: 0
SHORTNESS OF BREATH: 0

## 2023-12-05 NOTE — PROGRESS NOTES
Subjective  Chief Complaint   Patient presents with    Diabetes     Follow up       HPI    DM- blood sugars have been high recently. Admits to not eating well. Lab Results   Component Value Date    WBC 9.2 10/16/2023    HGB 17.3 10/16/2023    HCT 52.7 (H) 10/16/2023     10/16/2023    CHOL 150 10/16/2023    TRIG 259 (H) 10/16/2023    HDL 31 (L) 10/16/2023    LDLDIRECT 91 08/27/2014    ALT 17 10/16/2023    AST 21 10/16/2023     (L) 10/16/2023    K 4.5 10/16/2023    CL 99 10/16/2023    CREATININE 0.83 10/16/2023    BUN 19 10/16/2023    CO2 23 10/16/2023    TSH 1.540 02/11/2019    PSA 0.35 10/16/2023    GLUF 271 (H) 08/27/2014    LABA1C 6.3 07/03/2023     HTN- has been better. Taking medications. Has noticed some palpitations recently. Would like to try the cialis 20 to [facility]    Palpitations- on occasion. Never did see cardio for a work up    Patient Active Problem List    Diagnosis Date Noted    Type 2 diabetes mellitus with microalbuminuria ([address]) 09/26/2014    Encounter for long-term (current) insulin use ([address]) 09/26/2014    Hyperlipidemia 09/26/2014    Severe obesity (BMI 35.0-39. 9) with comorbidity ([address]) 03/30/2023    Chronic obstructive pulmonary disease ([address]) 03/30/2023    Left leg paresthesias 02/11/2019    Erectile dysfunction associated with type 2 diabetes mellitus ([address]) 02/09/2018    Tear of right retina without detachment 02/14/2014    Type 2 diabetes mellitus with peripheral neuropathy ([address]) 04/10/2013    Periodic limb movement disorder 03/08/2009    Obstructive sleep apnea 03/08/2009    History of peptic ulcer disease 06/01/1995     Past Medical History:   Diagnosis Date    Diabetes mellitus ([address])      No past surgical history on file.   Family History   Problem Relation Age of Onset    High Blood Pressure Mother     Diabetes Father     Cancer Paternal Uncle     Diabetes Paternal Grandmother     Breast Cancer Neg Hx     Colon Cancer Neg Hx     Heart Attack Neg Hx     High Cholesterol

## 2023-12-15 ENCOUNTER — OFFICE VISIT (OUTPATIENT)
Dept: SURGERY | Age: 58
End: 2023-12-15

## 2023-12-15 VITALS
WEIGHT: 238 LBS | HEART RATE: 95 BPM | HEIGHT: 65 IN | TEMPERATURE: 97.7 F | BODY MASS INDEX: 39.65 KG/M2 | OXYGEN SATURATION: 99 %

## 2023-12-15 DIAGNOSIS — Z12.11 COLON CANCER SCREENING: ICD-10-CM

## 2023-12-15 DIAGNOSIS — Z12.11 ENCOUNTER FOR SCREENING COLONOSCOPY: Primary | ICD-10-CM

## 2023-12-15 RX ORDER — SODIUM CHLORIDE 9 MG/ML
INJECTION, SOLUTION INTRAVENOUS PRN
OUTPATIENT
Start: 2023-12-15

## 2023-12-15 RX ORDER — SODIUM CHLORIDE 0.9 % (FLUSH) 0.9 %
5-40 SYRINGE (ML) INJECTION PRN
OUTPATIENT
Start: 2023-12-15

## 2023-12-15 RX ORDER — SODIUM CHLORIDE 0.9 % (FLUSH) 0.9 %
5-40 SYRINGE (ML) INJECTION EVERY 12 HOURS SCHEDULED
OUTPATIENT
Start: 2023-12-15

## 2023-12-15 NOTE — PROGRESS NOTES
Behavior: Behavior normal.         Thought Content: Thought content normal.         Judgment: Judgment normal.              Assessment/Plan:          Diagnosis Orders   1. Encounter for screening colonoscopy          Risks and benefits of colonoscopy explained. Bowel prep instructions were given. He wishes to proceed. Scheduled for January 25, 2024 per patient            Please note this report has beenpartially produced using speech recognition software and may cause contain errors related to that system including grammar, punctuation and spelling as well as words and phrases that may seem inappropriate.  If there arequestions or concerns please feel free to contact me to clarify

## 2023-12-28 ENCOUNTER — OFFICE VISIT (OUTPATIENT)
Dept: CARDIOLOGY CLINIC | Age: 58
End: 2023-12-28

## 2023-12-28 VITALS
HEART RATE: 86 BPM | WEIGHT: 235.6 LBS | BODY MASS INDEX: 39.25 KG/M2 | SYSTOLIC BLOOD PRESSURE: 130 MMHG | HEIGHT: 65 IN | DIASTOLIC BLOOD PRESSURE: 68 MMHG | OXYGEN SATURATION: 95 %

## 2023-12-28 DIAGNOSIS — G47.33 OSA (OBSTRUCTIVE SLEEP APNEA): ICD-10-CM

## 2023-12-28 DIAGNOSIS — R94.31 ABNORMAL ECG: ICD-10-CM

## 2023-12-28 DIAGNOSIS — R00.2 PALPITATIONS: ICD-10-CM

## 2023-12-28 DIAGNOSIS — R06.09 DOE (DYSPNEA ON EXERTION): ICD-10-CM

## 2023-12-28 DIAGNOSIS — Z00.00 PE (PHYSICAL EXAM), ANNUAL: Primary | ICD-10-CM

## 2023-12-28 DIAGNOSIS — E78.5 DYSLIPIDEMIA: ICD-10-CM

## 2024-01-01 DIAGNOSIS — Z79.4 TYPE 2 DIABETES MELLITUS WITH MICROALBUMINURIA, WITH LONG-TERM CURRENT USE OF INSULIN (HCC): ICD-10-CM

## 2024-01-01 DIAGNOSIS — E11.42 TYPE 2 DIABETES MELLITUS WITH PERIPHERAL NEUROPATHY (HCC): Chronic | ICD-10-CM

## 2024-01-01 DIAGNOSIS — E11.29 TYPE 2 DIABETES MELLITUS WITH MICROALBUMINURIA, WITH LONG-TERM CURRENT USE OF INSULIN (HCC): ICD-10-CM

## 2024-01-01 DIAGNOSIS — F32.A DEPRESSION, UNSPECIFIED DEPRESSION TYPE: ICD-10-CM

## 2024-01-01 DIAGNOSIS — R80.9 TYPE 2 DIABETES MELLITUS WITH MICROALBUMINURIA, WITH LONG-TERM CURRENT USE OF INSULIN (HCC): ICD-10-CM

## 2024-01-02 RX ORDER — ASPIRIN 81 MG/1
81 TABLET ORAL DAILY
Qty: 90 TABLET | Refills: 1 | Status: SHIPPED | OUTPATIENT
Start: 2024-01-02

## 2024-01-02 RX ORDER — PRAVASTATIN SODIUM 40 MG
TABLET ORAL
Qty: 90 TABLET | Refills: 1 | Status: SHIPPED | OUTPATIENT
Start: 2024-01-02

## 2024-01-02 RX ORDER — OMEPRAZOLE 20 MG/1
20 CAPSULE, DELAYED RELEASE ORAL EVERY OTHER DAY
Qty: 90 CAPSULE | Refills: 1 | Status: SHIPPED | OUTPATIENT
Start: 2024-01-02

## 2024-01-02 RX ORDER — EMPAGLIFLOZIN AND METFORMIN HYDROCHLORIDE 12.5; 1 MG/1; MG/1
1 TABLET ORAL 2 TIMES DAILY WITH MEALS
Qty: 180 TABLET | Refills: 1 | Status: SHIPPED | OUTPATIENT
Start: 2024-01-02

## 2024-01-02 RX ORDER — ESCITALOPRAM OXALATE 10 MG/1
10 TABLET ORAL DAILY
Qty: 90 TABLET | Refills: 1 | Status: SHIPPED | OUTPATIENT
Start: 2024-01-02

## 2024-01-02 RX ORDER — INSULIN DEGLUDEC INJECTION 100 U/ML
INJECTION, SOLUTION SUBCUTANEOUS
Qty: 3 ADJUSTABLE DOSE PRE-FILLED PEN SYRINGE | Refills: 3 | Status: SHIPPED | OUTPATIENT
Start: 2024-01-02

## 2024-01-02 RX ORDER — LISINOPRIL 10 MG/1
TABLET ORAL
Qty: 90 TABLET | Refills: 1 | Status: SHIPPED | OUTPATIENT
Start: 2024-01-02

## 2024-01-02 NOTE — TELEPHONE ENCOUNTER
Last Office Visit (last PCP visit):   2023    Next Visit Date:  Future Appointments   Date Time Provider Department Center   2024  4:00 PM PHONE PRE ADMISSION TESTING [facility] Center   2024 10:00 AM [place] [facility] INJECTION ROOM 2 [facility] Fac RAD   2024 11:00 AM [place] NUCLEAR MEDICINE ROOM 2 [facility] Fac RAD   2024 11:30 AM [facility] STRESS LAB 1 [facility]  [facility] Fac RAD   2024  1:00 PM [place] [facility]LE[facility] MEDICINE ROOM 2 [facility] Fac RAD   2024  2:30 PM [facility] ECHO 1 [facility]  [facility] Fac RAD   2024  1:45 PM Ashish Huitron MD [place] [facility]   3/20/2024  1:45 PM Gabbie Barnes, APRN - CNP [facility]       **If hasn't been seen in over a year OR hasn't followed up according to last diabetes/ADHD visit, make appointment for patient before sending refill to provider.    Rx requested:  Requested Prescriptions     Pending Prescriptions Disp Refills    Empagliflozin-metFORMIN HCl (SYNJARDY) 12.5-1000 MG TABS 180 tablet 1     Sig: Take 1 tablet by mouth 2 times daily (with meals)    omeprazole (PRILOSEC) 20 MG delayed release capsule 90 capsule 1     Sig: Take 1 capsule by mouth every other day    Insulin Pen Needle (NOVOFINE PLUS) 32G X 4 MM MISC 200 each 1     Si each by Does not apply route daily    lisinopril (PRINIVIL;ZESTRIL) 10 MG tablet 90 tablet 1     Sig: TAKE 1 TABLET BY MOUTH ONE TIME DAILY    pravastatin (PRAVACHOL) 40 MG tablet 90 tablet 1     Sig: TAKE ONE TABLET BY MOUTH ONE TIME A DAY AT BEDTIME FOR CHOLESTEROL    escitalopram (LEXAPRO) 10 MG tablet 90 tablet 1     Sig: Take 1 tablet by mouth daily    Semaglutide, 2 MG/DOSE, 8 MG/3ML SOPN 9 mL 1     Sig: Inject 2 mg into the skin once a week    Insulin Degludec (TRESIBA FLEXTOUCH) 100 UNIT/ML SOPN 3 Adjustable Dose Pre-filled Pen Syringe 3     Si units each

## 2024-01-02 NOTE — TELEPHONE ENCOUNTER
Last Office Visit (last PCP visit):   12/5/2023    Next Visit Date:  Future Appointments   Date Time Provider Department Center   1/18/2024  4:00 PM PHONE PRE ADMISSION TESTING [facility] Center   1/31/2024 10:00 AM [place] [facility] INJECTION ROOM 2 [facility] Fac RAD   1/31/2024 11:00 AM [place] NUCLEAR MEDICINE ROOM 2 [facility] Fac RAD   1/31/2024 11:30 AM [facility] STRESS LAB 1 [facility]  [facility] Fac RAD   1/31/2024  1:00 PM [place] NUCLEAR MEDICINE ROOM 2 [facility] Fac RAD   1/31/2024  2:30 PM [facility] ECHO 1 [facility]  [facility] Fac RAD   2/7/2024  1:45 PM Ashish Huitron MD [place] [facility]   3/20/2024  1:45 PM Gabbie Barnes, APRN - CNP [facility]       **If hasn't been seen in over a year OR hasn't followed up according to last diabetes/ADHD visit, make appointment for patient before sending refill to provider.    Rx requested:  Requested Prescriptions     Pending Prescriptions Disp Refills    aspirin EC 81 MG EC tablet 90 tablet 0     Sig: Take 1 tablet by mouth daily

## 2024-01-03 ENCOUNTER — TELEPHONE (OUTPATIENT)
Dept: FAMILY MEDICINE CLINIC | Age: 59
End: 2024-01-03

## 2024-01-03 NOTE — TELEPHONE ENCOUNTER
[facility] calling stating that they need directions on omeprazole and tresiba medication. Please call them back at [phone].

## 2024-01-03 NOTE — TELEPHONE ENCOUNTER
Called number back and let me to prior auth line. Medications were sent with directions yesterday.

## 2024-01-08 ENCOUNTER — TELEPHONE (OUTPATIENT)
Dept: FAMILY MEDICINE CLINIC | Age: 59
End: 2024-01-08

## 2024-01-08 NOTE — TELEPHONE ENCOUNTER
Anita from [facility] pharmacy called needing a clarification on the prescription for omeprazole.  The new script says take 1 x every other day, the old was 1 x daily.    Please advise at:  [phone]

## 2024-01-09 ENCOUNTER — TELEPHONE (OUTPATIENT)
Dept: PHARMACY | Facility: CLINIC | Age: 59
End: 2024-01-09

## 2024-01-09 RX ORDER — OMEPRAZOLE 20 MG/1
20 CAPSULE, DELAYED RELEASE ORAL EVERY OTHER DAY
Qty: 90 CAPSULE | Refills: 1 | Status: CANCELLED | OUTPATIENT
Start: 2024-01-09

## 2024-01-09 RX ORDER — OMEPRAZOLE 20 MG/1
20 CAPSULE, DELAYED RELEASE ORAL DAILY
Qty: 90 CAPSULE | Refills: 1 | Status: SHIPPED | OUTPATIENT
Start: 2024-01-09

## 2024-01-09 NOTE — TELEPHONE ENCOUNTER
**Patient is [facility]     Called patient to schedule 2024 yearly pharmacist appointment to discuss medications for Diabetes Management Program.     Spoke to patient and appointment scheduled for 1.12.24 @ 11:30 AM     Aarti Bae CphT   [facility]   Clinical Pharmacy    Department, toll free: [phone] Option #3      For Pharmacy Admin Tracking Only    Program: [facility]  CPA in place:  No  Recommendation Provided To: Patient/Caregiver: 1 via Telephone  Intervention Detail: Scheduled Appointment  Intervention Accepted By: Patient/Caregiver: 1  Gap Closed?: Yes   Time Spent (min): 10

## 2024-01-11 DIAGNOSIS — R00.2 PALPITATIONS: ICD-10-CM

## 2024-01-12 ENCOUNTER — TELEPHONE (OUTPATIENT)
Dept: PHARMACY | Facility: CLINIC | Age: 59
End: 2024-01-12

## 2024-01-12 NOTE — TELEPHONE ENCOUNTER
09/05/23 138/88     Hemoglobin A1c   Component Value Date    LABA1C 6.3 07/03/2023    LABA1C 9.2 (H) 03/29/2023    LABA1C 7.8 (H) 09/28/2022     Lipid Panel   Component Value Date    CHOL 150 10/16/2023    TRIG 259 (H) 10/16/2023    HDL 31 (L) 10/16/2023    LDLCALC 67 10/16/2023     ALT   Date Value Ref Range Status   10/16/2023 17 0 - 41 U/L Final     AST   Date Value Ref Range Status   10/16/2023 21 0 - 40 U/L Final     Renal Function   Component Value Date    LABGLOM >60.0 10/16/2023    CREATININE 0.83 10/16/2023   Estimated Creatinine Clearance: 111 mL/min (based on SCr of 0.83 mg/dL).     Latest Reference Range & Units 03/29/23 11:50   Creatinine, Ur Not Established mg/dL 96.7   Microalb/Creat Ratio POC 0.0 - 30.0 mg/G 172.7 (H)   Microalbumin, Random Urine Not Established mg/dL 16.70 (H)   (H): Data is abnormally high    Immunizations:  Administered Date(s) Administered    COVID-19, MODERNA BLUE border, Primary or Immunocompromised, (age 12y+), IM, 100 mcg/0.5mL 12/14/2020    COVID-19, PFIZER Bivalent, DO NOT Dilute, (age 12y+), IM, 30 mcg/0.3 mL 09/30/2022    COVID-19, PFIZER GRAY top, DO NOT Dilute, (age 12 y+), IM, 30 mcg/0.3 mL 05/27/2022    COVID-19, PFIZER PURPLE top, DILUTE for use, (age 12 y+), 30mcg/0.3mL 04/01/2021, 04/24/2021, 10/26/2021    Fluvirin 4 years and over 10/29/2015    Influenza 10/07/2011, 01/30/2014    Influenza Vaccine, unspecified formulation 10/07/1995, 10/02/1996, 10/02/1998, 10/25/1999, 12/05/2000, 10/23/2002, 02/01/2005, 10/07/2011, 01/30/2014, 10/14/2014, 10/29/2015, 10/26/2017    Influenza Virus Vaccine 10/07/1995, 10/02/1996, 10/02/1998, 10/25/1999, 12/05/2000, 10/23/2002, 02/01/2005, 10/14/2014    Influenza, AFLURIA (age 3 yrs+), FLUZONE, (age 6 mo+), MDV, 0.5mL 10/02/2018, 09/25/2019, 10/16/2020    Influenza, FLUARIX, FLULAVAL, FLUZONE (age 6 mo+) AND AFLURIA, (age 3 y+), PF, 0.5mL 10/26/2017    Influenza, FLUCELVAX, (age 6 mo+), MDCK, PF, 0.5mL 09/28/2021, 09/28/2022,

## 2024-01-14 PROBLEM — Z12.11 COLON CANCER SCREENING: Status: RESOLVED | Noted: 2023-12-15 | Resolved: 2024-01-14

## 2024-01-24 ENCOUNTER — ANESTHESIA EVENT (OUTPATIENT)
Dept: OPERATING ROOM | Age: 59
End: 2024-01-24
Payer: COMMERCIAL

## 2024-01-24 ASSESSMENT — LIFESTYLE VARIABLES: SMOKING_STATUS: 1

## 2024-01-24 ASSESSMENT — ENCOUNTER SYMPTOMS: SHORTNESS OF BREATH: 1

## 2024-01-25 ENCOUNTER — ANESTHESIA (OUTPATIENT)
Dept: OPERATING ROOM | Age: 59
End: 2024-01-25
Payer: COMMERCIAL

## 2024-01-25 ENCOUNTER — HOSPITAL ENCOUNTER (OUTPATIENT)
Age: 59
Setting detail: OUTPATIENT SURGERY
Discharge: HOME | End: 2024-01-25
Attending: COLON & RECTAL SURGERY | Admitting: COLON & RECTAL SURGERY
Payer: COMMERCIAL

## 2024-01-25 VITALS
OXYGEN SATURATION: 97 % | SYSTOLIC BLOOD PRESSURE: 128 MMHG | HEART RATE: 74 BPM | TEMPERATURE: 97.2 F | RESPIRATION RATE: 14 BRPM | DIASTOLIC BLOOD PRESSURE: 72 MMHG

## 2024-01-25 DIAGNOSIS — Z12.11 COLON CANCER SCREENING: ICD-10-CM

## 2024-01-25 LAB
GLUCOSE BLD-MCNC: 174 MG/DL (ref 70–99)
PERFORMED ON: ABNORMAL

## 2024-01-25 PROCEDURE — 2580000003 HC RX 258: Performed by: COLON & RECTAL SURGERY

## 2024-01-25 PROCEDURE — 6360000002 HC RX W HCPCS: Performed by: NURSE ANESTHETIST, CERTIFIED REGISTERED

## 2024-01-25 PROCEDURE — 3700000000 HC ANESTHESIA ATTENDED CARE: Performed by: COLON & RECTAL SURGERY

## 2024-01-25 PROCEDURE — 45380 COLONOSCOPY AND BIOPSY: CPT | Performed by: COLON & RECTAL SURGERY

## 2024-01-25 PROCEDURE — 7100000010 HC PHASE II RECOVERY - FIRST 15 MIN: Performed by: COLON & RECTAL SURGERY

## 2024-01-25 PROCEDURE — 2709999900 HC NON-CHARGEABLE SUPPLY: Performed by: COLON & RECTAL SURGERY

## 2024-01-25 PROCEDURE — 3700000001 HC ADD 15 MINUTES (ANESTHESIA): Performed by: COLON & RECTAL SURGERY

## 2024-01-25 PROCEDURE — 7100000011 HC PHASE II RECOVERY - ADDTL 15 MIN: Performed by: COLON & RECTAL SURGERY

## 2024-01-25 PROCEDURE — 3609027000 HC COLONOSCOPY: Performed by: COLON & RECTAL SURGERY

## 2024-01-25 PROCEDURE — 88305 TISSUE EXAM BY PATHOLOGIST: CPT

## 2024-01-25 RX ORDER — SODIUM CHLORIDE 9 MG/ML
INJECTION, SOLUTION INTRAVENOUS PRN
Status: DISCONTINUED | OUTPATIENT
Start: 2024-01-25 | End: 2024-01-25 | Stop reason: HOSPADM

## 2024-01-25 RX ORDER — SODIUM CHLORIDE 0.9 % (FLUSH) 0.9 %
5-40 SYRINGE (ML) INJECTION EVERY 12 HOURS SCHEDULED
Status: DISCONTINUED | OUTPATIENT
Start: 2024-01-25 | End: 2024-01-25 | Stop reason: HOSPADM

## 2024-01-25 RX ORDER — PROPOFOL 10 MG/ML
INJECTION, EMULSION INTRAVENOUS PRN
Status: DISCONTINUED | OUTPATIENT
Start: 2024-01-25 | End: 2024-01-25 | Stop reason: SDUPTHER

## 2024-01-25 RX ORDER — SODIUM CHLORIDE 0.9 % (FLUSH) 0.9 %
5-40 SYRINGE (ML) INJECTION PRN
Status: DISCONTINUED | OUTPATIENT
Start: 2024-01-25 | End: 2024-01-25 | Stop reason: HOSPADM

## 2024-01-25 RX ORDER — ONDANSETRON 2 MG/ML
4 INJECTION INTRAMUSCULAR; INTRAVENOUS
Status: DISCONTINUED | OUTPATIENT
Start: 2024-01-25 | End: 2024-01-25 | Stop reason: HOSPADM

## 2024-01-25 RX ORDER — MAGNESIUM HYDROXIDE 1200 MG/15ML
LIQUID ORAL PRN
Status: DISCONTINUED | OUTPATIENT
Start: 2024-01-25 | End: 2024-01-25 | Stop reason: ALTCHOICE

## 2024-01-25 RX ORDER — ACETAMINOPHEN 325 MG/1
650 TABLET ORAL
Status: DISCONTINUED | OUTPATIENT
Start: 2024-01-25 | End: 2024-01-25 | Stop reason: HOSPADM

## 2024-01-25 RX ORDER — METOCLOPRAMIDE HYDROCHLORIDE 5 MG/ML
INJECTION INTRAMUSCULAR; INTRAVENOUS PRN
Status: DISCONTINUED | OUTPATIENT
Start: 2024-01-25 | End: 2024-01-25 | Stop reason: SDUPTHER

## 2024-01-25 RX ADMIN — SODIUM CHLORIDE: 9 INJECTION, SOLUTION INTRAVENOUS at 06:42

## 2024-01-25 RX ADMIN — METOCLOPRAMIDE 10 MG: 5 INJECTION, SOLUTION INTRAMUSCULAR; INTRAVENOUS at 07:39

## 2024-01-25 RX ADMIN — PROPOFOL 10 MCG/KG/MIN: 10 INJECTION, EMULSION INTRAVENOUS at 07:42

## 2024-01-25 RX ADMIN — PROPOFOL 50 MG: 10 INJECTION, EMULSION INTRAVENOUS at 07:45

## 2024-01-25 RX ADMIN — PROPOFOL 150 MG: 10 INJECTION, EMULSION INTRAVENOUS at 07:41

## 2024-01-25 ASSESSMENT — PAIN SCALES - GENERAL: PAINLEVEL_OUTOF10: 0

## 2024-01-25 NOTE — H&P
Subjective:      Patient ID: Filipe Harper is a 58 y.o. male who presents for:      Chief Complaint   Patient presents with    New Patient         This is a 58-year-old male who has not had colon screening to date.     He has had remote episodes of rectal bleeding in the past.  Denies any current weight loss.  Denies abdominal pain.  Denies change in bowel habits.     Denies any family history of colon disease including cancer.    Past medical history reviewed and unchanged from this visit.           Past Medical History        Past Medical History:   Diagnosis Date    Diabetes mellitus (HCC)           Past Surgical History   No past surgical history on file.     Social History               Socioeconomic History    Marital status: Single       Spouse name: Not on file    Number of children: Not on file    Years of education: Not on file    Highest education level: Not on file   Occupational History    Not on file   Tobacco Use    Smoking status: Every Day       Packs/day: 1.00       Years: 30.00       Additional pack years: 0.00       Total pack years: 30.00       Types: Cigarettes       Start date: 9/25/1989    Smokeless tobacco: Never   Substance and Sexual Activity    Alcohol use: No       Alcohol/week: 0.0 standard drinks of alcohol    Drug use: No    Sexual activity: Not on file   Other Topics Concern    Not on file   Social History Narrative    Not on file      Social Determinants of Health           Financial Resource Strain: Low Risk  (3/28/2022)     Overall Financial Resource Strain (CARDIA)      Difficulty of Paying Living Expenses: Not hard at all   Food Insecurity: Not on file (3/29/2023)   Transportation Needs: No Transportation Needs (3/15/2021)     PRAPARE - Transportation      Lack of Transportation (Medical): No      Lack of Transportation (Non-Medical): No   Physical Activity: Not on file   Stress: Not on file   Social Connections: Not on file   Intimate Partner Violence: Not on file   Housing  Stability: Not on file         Family History         Family History   Problem Relation Age of Onset    High Blood Pressure Mother      Diabetes Father      Cancer Paternal Uncle      Diabetes Paternal Grandmother      Breast Cancer Neg Hx      Colon Cancer Neg Hx      Heart Attack Neg Hx      High Cholesterol Neg Hx      Stroke Neg Hx           Allergies:  Patient has no known allergies.     Review of Systems   Constitutional:  Negative for activity change, appetite change and unexpected weight change.   HENT:  Negative for congestion.    Respiratory:  Negative for chest tightness and shortness of breath.    Cardiovascular:  Negative for chest pain.   Gastrointestinal:  Negative for abdominal pain, anal bleeding, constipation, diarrhea and vomiting.   Genitourinary:  Negative for difficulty urinating.   Musculoskeletal:  Negative for arthralgias.   Skin:  Negative for color change.   Neurological:  Negative for dizziness and headaches.   Hematological:  Does not bruise/bleed easily.   Psychiatric/Behavioral:  Negative for agitation.          Objective:    Pulse 95   Temp 97.7 °F (36.5 °C) (Temporal)   Ht 1.651 m (5' 5\")   Wt 108 kg (238 lb)   SpO2 99%   BMI 39.61 kg/m²      Physical Exam  Constitutional:       General: He is not in acute distress.     Appearance: He is well-developed. He is not diaphoretic.   HENT:      Head: Normocephalic and atraumatic.   Eyes:      Pupils: Pupils are equal, round, and reactive to light.   Cardiovascular:      Rate and Rhythm: Normal rate and regular rhythm.      Heart sounds: Normal heart sounds.   Pulmonary:      Effort: Pulmonary effort is normal. No respiratory distress.      Breath sounds: Normal breath sounds. No wheezing.   Abdominal:      General: There is no distension.      Palpations: Abdomen is soft.      Tenderness: There is no abdominal tenderness. There is no guarding.   Musculoskeletal:         General: No tenderness. Normal range of motion.      Cervical

## 2024-01-25 NOTE — ANESTHESIA POSTPROCEDURE EVALUATION
Department of Anesthesiology  Postprocedure Note    Patient: Filipe Harper  MRN: [id number]  YOB: 1965  Date of evaluation: 1/25/2024    Procedure Summary       Date: 01/25/24 Room / Location: [address]    Anesthesia Start: 0736 Anesthesia Stop: 0812    Procedure: Colonoscopy possible biopsy/phone PAT Diagnosis:       Colon cancer screening      (Colon cancer screening [Z12.11])    Surgeons: Javon Irizarry MD Responsible Provider: Pascale Jacobs MD    Anesthesia Type: MAC ASA Status: 3            Anesthesia Type: MAC    Barbara Phase I: Barbara Score: 10    Barbara Phase II:      Anesthesia Post Evaluation    Patient location: short stay.  Level of consciousness: awake  Pain score: 0  Airway patency: patent  Nausea & Vomiting: no nausea and no vomiting  Cardiovascular status: hemodynamically stable  Respiratory status: acceptable, spontaneous ventilation, face mask and nonlabored ventilation  Hydration status: stable  Pain management: adequate        No notable events documented.

## 2024-01-25 NOTE — ANESTHESIA PRE PROCEDURE
Department of Anesthesiology  Preprocedure Note       Name:  Filipe Harper   Age:  58 y.o.  :  1965                                          MRN:  [id number]         Date:  2024      Surgeon: Surgeon(s):  Javon Irizarry MD    Procedure: Procedure(s):  Colonoscopy possible biopsy/phone PAT    Medications prior to admission:   Prior to Admission medications    Medication Sig Start Date End Date Taking? Authorizing Provider   omeprazole (PRILOSEC) 20 MG delayed release capsule Take 1 capsule by mouth Daily 24   Gabbie Barnes APRN - CNP   Empagliflozin-metFORMIN HCl (SYNJARDY) 12.5-1000 MG TABS Take 1 tablet by mouth 2 times daily (with meals) 24   Gabbie Barnes APRN - CNP   Insulin Pen Needle (NOVOFINE PLUS) 32G X 4 MM MISC Inject 1 each into the skin daily 24   Gabbie Barnes APRN - CNP   lisinopril (PRINIVIL;ZESTRIL) 10 MG tablet TAKE 1 TABLET BY MOUTH ONE TIME DAILY 24   Gabbie Barnes APRN - CNP   pravastatin (PRAVACHOL) 40 MG tablet TAKE ONE TABLET BY MOUTH ONE TIME A DAY AT BEDTIME FOR CHOLESTEROL 24   Gabbie Barnes APRN - CNP   escitalopram (LEXAPRO) 10 MG tablet Take 1 tablet by mouth daily 24   Gabbie Barnes APRN - CNP   Semaglutide, 2 MG/DOSE, 8 MG/3ML SOPN Inject 2 mg into the skin once a week 24   Gabbie Barnes APRN - CNP   Insulin Degludec (TRESIBA FLEXTOUCH) 100 UNIT/ML SOPN 40 units each  Patient taking differently: Inject 40 Units into the skin daily 40 units each 24   Gabbie Barnes APRN - CNP   aspirin 81 MG EC tablet Take 1 tablet by mouth daily 24   Gabbie Barnes APRN - CNP   tadalafil (CIALIS) 5 MG tablet Take 1 tablet by mouth daily as needed for Erectile Dysfunction 23   Gabbie Barnes APRN - CNP   albuterol sulfate HFA (VENTOLIN HFA) 108 (90 Base) MCG/ACT inhaler Inhale 2 puffs into the lungs 4 times daily as needed for Wheezing 23   Gabbie Barnes, APRN - CNP   blood glucose test strips (PRODIGY NO CODING

## 2024-01-25 NOTE — PROGRESS NOTES
Patient awake and alert, denies pain nausea or dyspnea at this time. Abdomen soft, passing flatus. Dr Irizarry in to see patient.

## 2024-01-25 NOTE — DISCHARGE INSTRUCTIONS
Normal post colonoscopy instructions    Repeat colonoscopy in 10 years    Call office in 1 week regarding polyp results    No driving today

## 2024-01-25 NOTE — PROGRESS NOTES
Discharge instructions reviewed with patient and pt's mother both verbalized understanding. Pt able to dress self.

## 2024-01-31 ENCOUNTER — HOSPITAL ENCOUNTER (OUTPATIENT)
Dept: NUCLEAR MEDICINE | Age: 59
Discharge: HOME | End: 2024-02-02
Attending: INTERNAL MEDICINE
Payer: COMMERCIAL

## 2024-01-31 ENCOUNTER — HOSPITAL ENCOUNTER (OUTPATIENT)
Age: 59
Discharge: HOME | End: 2024-02-02
Attending: INTERNAL MEDICINE
Payer: COMMERCIAL

## 2024-01-31 VITALS
HEIGHT: 65 IN | SYSTOLIC BLOOD PRESSURE: 147 MMHG | WEIGHT: 235 LBS | BODY MASS INDEX: 39.15 KG/M2 | DIASTOLIC BLOOD PRESSURE: 76 MMHG

## 2024-01-31 DIAGNOSIS — R94.31 ABNORMAL ECG: ICD-10-CM

## 2024-01-31 LAB
ECHO AO ROOT DIAM: 3.9 CM
ECHO AO ROOT INDEX: 1.83 CM/M2
ECHO AV AREA PEAK VELOCITY: 2.5 CM2
ECHO AV AREA VTI: 2.8 CM2
ECHO AV AREA/BSA PEAK VELOCITY: 1.2 CM2/M2
ECHO AV AREA/BSA VTI: 1.3 CM2/M2
ECHO AV CUSP MM: 1.7 CM
ECHO AV MEAN GRADIENT: 4 MMHG
ECHO AV MEAN VELOCITY: 0.9 M/S
ECHO AV PEAK GRADIENT: 8 MMHG
ECHO AV PEAK VELOCITY: 1.4 M/S
ECHO AV VELOCITY RATIO: 0.79
ECHO AV VTI: 28.1 CM
ECHO BSA: 2.22 M2
ECHO BSA: 2.22 M2
ECHO EST RA PRESSURE: 3 MMHG
ECHO LA VOL A-L A2C: 46 ML (ref 18–58)
ECHO LA VOL A-L A4C: 36 ML (ref 18–58)
ECHO LA VOL MOD A2C: 45 ML (ref 18–58)
ECHO LA VOL MOD A4C: 34 ML (ref 18–58)
ECHO LA VOLUME AREA LENGTH: 42 ML
ECHO LA VOLUME INDEX A-L A2C: 22 ML/M2 (ref 16–34)
ECHO LA VOLUME INDEX A-L A4C: 17 ML/M2 (ref 16–34)
ECHO LA VOLUME INDEX AREA LENGTH: 20 ML/M2 (ref 16–34)
ECHO LA VOLUME INDEX MOD A2C: 21 ML/M2 (ref 16–34)
ECHO LA VOLUME INDEX MOD A4C: 16 ML/M2 (ref 16–34)
ECHO LV E' LATERAL VELOCITY: 10 CM/S
ECHO LV E' SEPTAL VELOCITY: 8 CM/S
ECHO LV EDV A2C: 50 ML
ECHO LV EDV A4C: 100 ML
ECHO LV EDV BP: 71 ML (ref 67–155)
ECHO LV EDV INDEX A4C: 47 ML/M2
ECHO LV EDV INDEX BP: 33 ML/M2
ECHO LV EDV NDEX A2C: 23 ML/M2
ECHO LV EJECTION FRACTION A2C: 65 %
ECHO LV EJECTION FRACTION A4C: 71 %
ECHO LV EJECTION FRACTION BIPLANE: 69 % (ref 55–100)
ECHO LV ESV A2C: 17 ML
ECHO LV ESV A4C: 29 ML
ECHO LV ESV BP: 22 ML (ref 22–58)
ECHO LV ESV INDEX A2C: 8 ML/M2
ECHO LV ESV INDEX A4C: 14 ML/M2
ECHO LV ESV INDEX BP: 10 ML/M2
ECHO LV FRACTIONAL SHORTENING: 37 % (ref 28–44)
ECHO LV INTERNAL DIMENSION DIASTOLE INDEX: 1.92 CM/M2
ECHO LV INTERNAL DIMENSION DIASTOLIC: 4.1 CM (ref 4.2–5.9)
ECHO LV INTERNAL DIMENSION SYSTOLIC INDEX: 1.22 CM/M2
ECHO LV INTERNAL DIMENSION SYSTOLIC: 2.6 CM
ECHO LV IVSD: 1.3 CM (ref 0.6–1)
ECHO LV IVSS: 1.6 CM
ECHO LV MASS 2D: 161.4 G (ref 88–224)
ECHO LV MASS INDEX 2D: 75.8 G/M2 (ref 49–115)
ECHO LV POSTERIOR WALL DIASTOLIC: 1 CM (ref 0.6–1)
ECHO LV POSTERIOR WALL SYSTOLIC: 1.2 CM
ECHO LV RELATIVE WALL THICKNESS RATIO: 0.49
ECHO LVOT AREA: 3.1 CM2
ECHO LVOT AV VTI INDEX: 0.89
ECHO LVOT DIAM: 2 CM
ECHO LVOT MEAN GRADIENT: 3 MMHG
ECHO LVOT PEAK GRADIENT: 5 MMHG
ECHO LVOT PEAK VELOCITY: 1.1 M/S
ECHO LVOT STROKE VOLUME INDEX: 36.9 ML/M2
ECHO LVOT SV: 78.5 ML
ECHO LVOT VTI: 25 CM
ECHO MV A VELOCITY: 0.66 M/S
ECHO MV AREA VTI: 3.5 CM2
ECHO MV E DECELERATION TIME (DT): 212.3 MS
ECHO MV E VELOCITY: 0.68 M/S
ECHO MV E/A RATIO: 1.03
ECHO MV E/E' LATERAL: 6.8
ECHO MV E/E' RATIO (AVERAGED): 7.65
ECHO MV LVOT VTI INDEX: 0.89
ECHO MV MAX VELOCITY: 0.9 M/S
ECHO MV MEAN GRADIENT: 1 MMHG
ECHO MV MEAN VELOCITY: 0.6 M/S
ECHO MV PEAK GRADIENT: 3 MMHG
ECHO MV VTI: 22.2 CM
ECHO PV MAX VELOCITY: 1.2 M/S
ECHO PV PEAK GRADIENT: 6 MMHG
ECHO RIGHT VENTRICULAR SYSTOLIC PRESSURE (RVSP): 12 MMHG
ECHO RV INTERNAL DIMENSION: 2.9 CM
ECHO RV TAPSE: 2.1 CM (ref 1.7–?)
ECHO TV REGURGITANT MAX VELOCITY: 1.49 M/S
ECHO TV REGURGITANT PEAK GRADIENT: 9 MMHG
NUC STRESS EJECTION FRACTION: 65 %
STRESS BASELINE DIAS BP: 82 MMHG
STRESS BASELINE HR: 83 BPM
STRESS BASELINE SYS BP: 166 MMHG
STRESS ESTIMATED WORKLOAD: 1 METS
STRESS PEAK DIAS BP: 93 MMHG
STRESS PEAK SYS BP: 202 MMHG
STRESS PERCENT HR ACHIEVED: 66 %
STRESS POST PEAK HR: 107 BPM
STRESS RATE PRESSURE PRODUCT: NORMAL BPM*MMHG
STRESS TARGET HR: 163 BPM
TID: 1.1

## 2024-01-31 PROCEDURE — 93306 TTE W/DOPPLER COMPLETE: CPT

## 2024-01-31 PROCEDURE — 93306 TTE W/DOPPLER COMPLETE: CPT | Performed by: INTERNAL MEDICINE

## 2024-01-31 PROCEDURE — 3430000000 HC RX DIAGNOSTIC RADIOPHARMACEUTICAL: Performed by: INTERNAL MEDICINE

## 2024-01-31 PROCEDURE — 93017 CV STRESS TEST TRACING ONLY: CPT

## 2024-01-31 PROCEDURE — 2580000003 HC RX 258: Performed by: INTERNAL MEDICINE

## 2024-01-31 PROCEDURE — 78452 HT MUSCLE IMAGE SPECT MULT: CPT

## 2024-01-31 PROCEDURE — A9502 TC99M TETROFOSMIN: HCPCS | Performed by: INTERNAL MEDICINE

## 2024-01-31 PROCEDURE — 6360000002 HC RX W HCPCS: Performed by: INTERNAL MEDICINE

## 2024-01-31 RX ORDER — SODIUM CHLORIDE 0.9 % (FLUSH) 0.9 %
10 SYRINGE (ML) INJECTION PRN
Status: DISCONTINUED | OUTPATIENT
Start: 2024-01-31 | End: 2024-02-03 | Stop reason: HOSPADM

## 2024-01-31 RX ORDER — REGADENOSON 0.08 MG/ML
0.4 INJECTION, SOLUTION INTRAVENOUS
Status: COMPLETED | OUTPATIENT
Start: 2024-01-31 | End: 2024-01-31

## 2024-01-31 RX ADMIN — Medication 10 ML: at 09:42

## 2024-01-31 RX ADMIN — Medication 10 ML: at 10:26

## 2024-01-31 RX ADMIN — TETROFOSMIN 11.7 MILLICURIE: 1.38 INJECTION, POWDER, LYOPHILIZED, FOR SOLUTION INTRAVENOUS at 09:42

## 2024-01-31 RX ADMIN — Medication 10 ML: at 10:25

## 2024-01-31 RX ADMIN — REGADENOSON 0.4 MG: 0.08 INJECTION, SOLUTION INTRAVENOUS at 10:25

## 2024-01-31 RX ADMIN — TETROFOSMIN 35.8 MILLICURIE: 1.38 INJECTION, POWDER, LYOPHILIZED, FOR SOLUTION INTRAVENOUS at 10:25

## 2024-02-07 ENCOUNTER — TELEMEDICINE (OUTPATIENT)
Dept: CARDIOLOGY CLINIC | Age: 59
End: 2024-02-07
Payer: COMMERCIAL

## 2024-02-07 DIAGNOSIS — E78.5 DYSLIPIDEMIA: ICD-10-CM

## 2024-02-07 DIAGNOSIS — R94.31 ABNORMAL ECG: Primary | ICD-10-CM

## 2024-02-07 DIAGNOSIS — G47.33 OSA (OBSTRUCTIVE SLEEP APNEA): ICD-10-CM

## 2024-02-07 DIAGNOSIS — R00.2 PALPITATIONS: ICD-10-CM

## 2024-02-07 DIAGNOSIS — E11.42 TYPE 2 DIABETES MELLITUS WITH PERIPHERAL NEUROPATHY (HCC): Chronic | ICD-10-CM

## 2024-02-07 PROCEDURE — 99214 OFFICE O/P EST MOD 30 MIN: CPT | Performed by: INTERNAL MEDICINE

## 2024-02-07 RX ORDER — METOPROLOL SUCCINATE 25 MG/1
25 TABLET, EXTENDED RELEASE ORAL DAILY
Qty: 90 TABLET | Refills: 0 | Status: SHIPPED | OUTPATIENT
Start: 2024-02-07

## 2024-02-07 RX ORDER — METOPROLOL SUCCINATE 25 MG/1
25 TABLET, EXTENDED RELEASE ORAL DAILY
Qty: 90 TABLET | Refills: 3 | Status: SHIPPED | OUTPATIENT
Start: 2024-02-07 | End: 2024-02-07 | Stop reason: SDUPTHER

## 2024-02-07 RX ORDER — LISINOPRIL 10 MG/1
TABLET ORAL
Qty: 90 TABLET | Refills: 1 | Status: SHIPPED | OUTPATIENT
Start: 2024-02-07

## 2024-02-07 ASSESSMENT — ENCOUNTER SYMPTOMS
COUGH: 0
SHORTNESS OF BREATH: 0
WHEEZING: 0
STRIDOR: 0
BLOOD IN STOOL: 0
GASTROINTESTINAL NEGATIVE: 1
RESPIRATORY NEGATIVE: 1
NAUSEA: 0
EYES NEGATIVE: 1
CHEST TIGHTNESS: 0

## 2024-02-07 NOTE — PROGRESS NOTES
OFFICE VISIT        Patient: Filipe Harper  YOB: 1965  MRN: [id number]    Chief Complaint: Palpitations  Chief Complaint   Patient presents with    Palpitations       CV Data:  1/24 EM SR w RBBB. 1 episode 3 beat SVT.  Occ PVCs. Rare APCs   1/24 Echo EF 60-65  1/24 SPECT negative EF 65    Subjective/HPI  referred for Palps. Frequent palsp that are bothersome. He is minimally active. Has a sedentary job. He denies any CP.  His ECG is abn w RBBB    Patient and/or health care decision maker is aware that that he/she may receive a bill for this telephone service, depending on his insurance coverage, and has provided verbal consent to proceed.  This visit was completed via telephone.  Total time 15 minutes.      2/7/24 still wpalps no cp no sob no falls no bleed. 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   Smoker - 2PPD  NoETOH  Lives w girl friend      Past Medical History:   Diagnosis Date    Diabetes mellitus (HCC)     Hypertension     Sleep apnea        Past Surgical History:   Procedure Laterality Date    COLONOSCOPY N/A 1/25/2024    Colonoscopy possible biopsy/phone PAT performed by Javon Irizarry MD at [facility] [place]    TONSILLECTOMY      WISDOM TOOTH EXTRACTION         Family History   Problem Relation Age of Onset    Gout Mother     Asthma Mother     High Blood Pressure Mother     Heart Disease Father     Cancer Father     Diabetes Father     Cancer Paternal Uncle     Diabetes Paternal Grandmother     Breast Cancer Neg Hx     Colon Cancer Neg Hx     Heart Attack Neg Hx     High Cholesterol Neg Hx     Stroke Neg Hx        Social History     Socioeconomic History    Marital status: Single   Tobacco Use    Smoking status: Every Day     Current packs/day: 1.00     Average packs/day: 1 pack/day for 34.4 years (34.4 ttl pk-yrs)     Types: Cigarettes     Start date: 9/25/1989    Smokeless tobacco: Never   Vaping Use    Vaping Use: Never used   Substance and

## 2024-02-13 RX ORDER — INSULIN DEGLUDEC INJECTION 100 U/ML
40 INJECTION, SOLUTION SUBCUTANEOUS NIGHTLY
COMMUNITY

## 2024-03-21 ENCOUNTER — PATIENT MESSAGE (OUTPATIENT)
Dept: FAMILY MEDICINE CLINIC | Age: 59
End: 2024-03-21

## 2024-03-21 ENCOUNTER — OFFICE VISIT (OUTPATIENT)
Dept: FAMILY MEDICINE CLINIC | Age: 59
End: 2024-03-21
Payer: COMMERCIAL

## 2024-03-21 VITALS
HEART RATE: 105 BPM | TEMPERATURE: 97.7 F | DIASTOLIC BLOOD PRESSURE: 68 MMHG | OXYGEN SATURATION: 97 % | SYSTOLIC BLOOD PRESSURE: 128 MMHG | WEIGHT: 235.2 LBS | BODY MASS INDEX: 39.18 KG/M2 | HEIGHT: 65 IN

## 2024-03-21 DIAGNOSIS — R80.9 TYPE 2 DIABETES MELLITUS WITH MICROALBUMINURIA, WITH LONG-TERM CURRENT USE OF INSULIN (HCC): ICD-10-CM

## 2024-03-21 DIAGNOSIS — E11.29 TYPE 2 DIABETES MELLITUS WITH MICROALBUMINURIA, WITH LONG-TERM CURRENT USE OF INSULIN (HCC): ICD-10-CM

## 2024-03-21 DIAGNOSIS — M25.561 CHRONIC PAIN OF BOTH KNEES: Primary | ICD-10-CM

## 2024-03-21 DIAGNOSIS — R53.83 FATIGUE, UNSPECIFIED TYPE: ICD-10-CM

## 2024-03-21 DIAGNOSIS — E66.01 SEVERE OBESITY (BMI 35.0-39.9) WITH COMORBIDITY (HCC): ICD-10-CM

## 2024-03-21 DIAGNOSIS — G89.29 CHRONIC PAIN OF BOTH KNEES: Primary | ICD-10-CM

## 2024-03-21 DIAGNOSIS — Z12.5 SCREENING PSA (PROSTATE SPECIFIC ANTIGEN): ICD-10-CM

## 2024-03-21 DIAGNOSIS — E78.5 HYPERLIPIDEMIA, UNSPECIFIED HYPERLIPIDEMIA TYPE: ICD-10-CM

## 2024-03-21 DIAGNOSIS — E11.42 TYPE 2 DIABETES MELLITUS WITH PERIPHERAL NEUROPATHY (HCC): ICD-10-CM

## 2024-03-21 DIAGNOSIS — J44.9 CHRONIC OBSTRUCTIVE PULMONARY DISEASE, UNSPECIFIED COPD TYPE (HCC): ICD-10-CM

## 2024-03-21 DIAGNOSIS — E11.42 TYPE 2 DIABETES MELLITUS WITH PERIPHERAL NEUROPATHY (HCC): Primary | ICD-10-CM

## 2024-03-21 DIAGNOSIS — M25.562 CHRONIC PAIN OF BOTH KNEES: Primary | ICD-10-CM

## 2024-03-21 DIAGNOSIS — Z79.4 TYPE 2 DIABETES MELLITUS WITH MICROALBUMINURIA, WITH LONG-TERM CURRENT USE OF INSULIN (HCC): ICD-10-CM

## 2024-03-21 LAB
ALBUMIN SERPL-MCNC: 4.3 G/DL (ref 3.5–4.6)
ALP SERPL-CCNC: 82 U/L (ref 35–104)
ALT SERPL-CCNC: 29 U/L (ref 0–41)
ANION GAP SERPL CALCULATED.3IONS-SCNC: 12 MEQ/L (ref 9–15)
AST SERPL-CCNC: 23 U/L (ref 0–40)
BASOPHILS # BLD: 0.2 K/UL (ref 0–0.2)
BASOPHILS NFR BLD: 1.4 %
BILIRUB SERPL-MCNC: <0.2 MG/DL (ref 0.2–0.7)
BUN SERPL-MCNC: 15 MG/DL (ref 6–20)
CALCIUM SERPL-MCNC: 9.2 MG/DL (ref 8.5–9.9)
CHLORIDE SERPL-SCNC: 96 MEQ/L (ref 95–107)
CHOLEST SERPL-MCNC: 153 MG/DL (ref 0–199)
CO2 SERPL-SCNC: 23 MEQ/L (ref 20–31)
CREAT SERPL-MCNC: 0.75 MG/DL (ref 0.7–1.2)
CREAT UR-MCNC: 30.3 MG/DL
EOSINOPHIL # BLD: 0.2 K/UL (ref 0–0.7)
EOSINOPHIL NFR BLD: 1.5 %
ERYTHROCYTE [DISTWIDTH] IN BLOOD BY AUTOMATED COUNT: 13.5 % (ref 11.5–14.5)
GLOBULIN SER CALC-MCNC: 3.1 G/DL (ref 2.3–3.5)
GLUCOSE SERPL-MCNC: 308 MG/DL (ref 70–99)
HBA1C MFR BLD: 10.2 % (ref 4.8–5.9)
HCT VFR BLD AUTO: 52.5 % (ref 42–52)
HDLC SERPL-MCNC: 29 MG/DL (ref 40–59)
HGB BLD-MCNC: 17.5 G/DL (ref 14–18)
LDLC SERPL CALC-MCNC: ABNORMAL MG/DL (ref 0–129)
LYMPHOCYTES # BLD: 2.5 K/UL (ref 1–4.8)
LYMPHOCYTES NFR BLD: 22.8 %
MCH RBC QN AUTO: 29.7 PG (ref 27–31.3)
MCHC RBC AUTO-ENTMCNC: 33.3 % (ref 33–37)
MCV RBC AUTO: 89 FL (ref 79–92.2)
MICROALBUMIN UR-MCNC: 10.8 MG/DL
MICROALBUMIN/CREAT UR-RTO: 356.4 MG/G (ref 0–30)
MONOCYTES # BLD: 0.9 K/UL (ref 0.2–0.8)
MONOCYTES NFR BLD: 8.5 %
NEUTROPHILS # BLD: 7.1 K/UL (ref 1.4–6.5)
NEUTS SEG NFR BLD: 64.2 %
PLATELET # BLD AUTO: 208 K/UL (ref 130–400)
POTASSIUM SERPL-SCNC: 4.5 MEQ/L (ref 3.4–4.9)
PROT SERPL-MCNC: 7.4 G/DL (ref 6.3–8)
PSA SERPL-MCNC: 1.58 NG/ML (ref 0–4)
RBC # BLD AUTO: 5.9 M/UL (ref 4.7–6.1)
SODIUM SERPL-SCNC: 131 MEQ/L (ref 135–144)
TRIGL SERPL-MCNC: 464 MG/DL (ref 0–150)
WBC # BLD AUTO: 11 K/UL (ref 4.8–10.8)

## 2024-03-21 PROCEDURE — 99214 OFFICE O/P EST MOD 30 MIN: CPT | Performed by: NURSE PRACTITIONER

## 2024-03-21 ASSESSMENT — PATIENT HEALTH QUESTIONNAIRE - PHQ9
9. THOUGHTS THAT YOU WOULD BE BETTER OFF DEAD, OR OF HURTING YOURSELF: NOT AT ALL
6. FEELING BAD ABOUT YOURSELF - OR THAT YOU ARE A FAILURE OR HAVE LET YOURSELF OR YOUR FAMILY DOWN: NOT AT ALL
8. MOVING OR SPEAKING SO SLOWLY THAT OTHER PEOPLE COULD HAVE NOTICED. OR THE OPPOSITE, BEING SO FIGETY OR RESTLESS THAT YOU HAVE BEEN MOVING AROUND A LOT MORE THAN USUAL: NOT AT ALL
8. MOVING OR SPEAKING SO SLOWLY THAT OTHER PEOPLE COULD HAVE NOTICED. OR THE OPPOSITE - BEING SO FIDGETY OR RESTLESS THAT YOU HAVE BEEN MOVING AROUND A LOT MORE THAN USUAL: NOT AT ALL
5. POOR APPETITE OR OVEREATING: SEVERAL DAYS
2. FEELING DOWN, DEPRESSED OR HOPELESS: NOT AT ALL
SUM OF ALL RESPONSES TO PHQ QUESTIONS 1-9: 3
2. FEELING DOWN, DEPRESSED OR HOPELESS: NOT AT ALL
5. POOR APPETITE OR OVEREATING: SEVERAL DAYS
9. THOUGHTS THAT YOU WOULD BE BETTER OFF DEAD, OR OF HURTING YOURSELF: NOT AT ALL
3. TROUBLE FALLING OR STAYING ASLEEP: NOT AT ALL
SUM OF ALL RESPONSES TO PHQ QUESTIONS 1-9: 3
3. TROUBLE FALLING OR STAYING ASLEEP: NOT AT ALL
SUM OF ALL RESPONSES TO PHQ QUESTIONS 1-9: 3
1. LITTLE INTEREST OR PLEASURE IN DOING THINGS: NOT AT ALL
7. TROUBLE CONCENTRATING ON THINGS, SUCH AS READING THE NEWSPAPER OR WATCHING TELEVISION: NOT AT ALL
4. FEELING TIRED OR HAVING LITTLE ENERGY: MORE THAN HALF THE DAYS
10. IF YOU CHECKED OFF ANY PROBLEMS, HOW DIFFICULT HAVE THESE PROBLEMS MADE IT FOR YOU TO DO YOUR WORK, TAKE CARE OF THINGS AT HOME, OR GET ALONG WITH OTHER PEOPLE: NOT DIFFICULT AT ALL
SUM OF ALL RESPONSES TO PHQ9 QUESTIONS 1 & 2: 0
4. FEELING TIRED OR HAVING LITTLE ENERGY: MORE THAN HALF THE DAYS
7. TROUBLE CONCENTRATING ON THINGS, SUCH AS READING THE NEWSPAPER OR WATCHING TELEVISION: NOT AT ALL
10. IF YOU CHECKED OFF ANY PROBLEMS, HOW DIFFICULT HAVE THESE PROBLEMS MADE IT FOR YOU TO DO YOUR WORK, TAKE CARE OF THINGS AT HOME, OR GET ALONG WITH OTHER PEOPLE: NOT DIFFICULT AT ALL
SUM OF ALL RESPONSES TO PHQ QUESTIONS 1-9: 3
SUM OF ALL RESPONSES TO PHQ QUESTIONS 1-9: 3
6. FEELING BAD ABOUT YOURSELF - OR THAT YOU ARE A FAILURE OR HAVE LET YOURSELF OR YOUR FAMILY DOWN: NOT AT ALL
1. LITTLE INTEREST OR PLEASURE IN DOING THINGS: NOT AT ALL

## 2024-03-21 ASSESSMENT — ENCOUNTER SYMPTOMS
SHORTNESS OF BREATH: 0
DIARRHEA: 0
CONSTIPATION: 0
COUGH: 0

## 2024-03-21 NOTE — TELEPHONE ENCOUNTER
From: Filipe Harper  To: Gabbie Barnes  Sent: 3/21/2024 1:14 PM EDT  Subject: Christianz    Gabbie, Mann seeing you today. Can you let me know next steps on getting my knees looked at please? Appointments at the [place] office would be really convenient for me. Thank You, Darrius

## 2024-03-21 NOTE — PROGRESS NOTES
Take 1 tablet by mouth daily 90 tablet 1    tadalafil (CIALIS) 5 MG tablet Take 1 tablet by mouth daily as needed for Erectile Dysfunction 90 tablet 0    albuterol sulfate HFA (VENTOLIN HFA) 108 (90 Base) MCG/ACT inhaler Inhale 2 puffs into the lungs 4 times daily as needed for Wheezing 3 each 0    blood glucose test strips (PRODIGY NO CODING BLOOD GLUC) strip Use to test once daily and as needed as directed by provider 100 each 3    Alcohol Swabs PADS Use to test blood sugars once a day as directed 100 each 3    Magnesium 500 MG TABS Take 1 tablet by mouth daily      BEE POLLEN PO Take 2 capsules by mouth daily       Multiple Vitamins-Minerals (ONE DAILY MULTIVITAMIN ADULT) TABS Take 1 tablet by mouth every other day       Blood Glucose Monitoring Suppl (PRODIGY AUTOCODE BLOOD GLUCOSE) w/Device KIT Use to test once daily and as needed as directed by provider 1 kit 0    PRODIGY LANCETS 28G MISC Use to test once daily and as needed as directed by provider 100 each 3     No current facility-administered medications on file prior to visit.     No Known Allergies    Review of Systems   Constitutional:  Negative for fatigue.   Respiratory:  Negative for cough and shortness of breath.    Cardiovascular:  Negative for chest pain.   Gastrointestinal:  Negative for constipation and diarrhea.       Objective  Vitals:    03/21/24 1102 03/21/24 1108 03/21/24 1123   BP: (!) 142/78 (!) 152/68 128/68   Site:  Left Upper Arm    Position:  Sitting    Cuff Size:  Medium Adult    Pulse: (!) 105     Temp: 97.7 °F (36.5 °C)     SpO2: 97%     Weight: 106.7 kg (235 lb 3.2 oz)     Height: 1.651 m (5' 5\")       Physical Exam  Vitals and nursing note reviewed.   Constitutional:       Appearance: Normal appearance. He is obese.   HENT:      Head: Normocephalic.      Nose: Nose normal.      Mouth/Throat:      Mouth: Mucous membranes are moist.      Pharynx: Oropharynx is clear.   Eyes:      Extraocular Movements: Extraocular movements intact.

## 2024-03-26 RX ORDER — TIRZEPATIDE 5 MG/.5ML
5 INJECTION, SOLUTION SUBCUTANEOUS WEEKLY
Qty: 4 ADJUSTABLE DOSE PRE-FILLED PEN SYRINGE | Refills: 1 | Status: SHIPPED | OUTPATIENT
Start: 2024-03-26

## 2024-03-26 NOTE — PROGRESS NOTES
I sent mounjaro to [facility] [employer]. I started you on the second lowest dose. Will likely need to increase the dose after a month.

## 2024-03-28 ENCOUNTER — HOSPITAL ENCOUNTER (OUTPATIENT)
Dept: GENERAL RADIOLOGY | Age: 59
Discharge: HOME | End: 2024-03-30
Payer: COMMERCIAL

## 2024-03-28 DIAGNOSIS — M25.562 CHRONIC PAIN OF BOTH KNEES: ICD-10-CM

## 2024-03-28 DIAGNOSIS — G89.29 CHRONIC PAIN OF BOTH KNEES: ICD-10-CM

## 2024-03-28 DIAGNOSIS — M25.561 CHRONIC PAIN OF BOTH KNEES: ICD-10-CM

## 2024-03-28 PROCEDURE — 73560 X-RAY EXAM OF KNEE 1 OR 2: CPT

## 2024-04-13 DIAGNOSIS — F32.A DEPRESSION, UNSPECIFIED DEPRESSION TYPE: ICD-10-CM

## 2024-04-13 DIAGNOSIS — E11.42 TYPE 2 DIABETES MELLITUS WITH PERIPHERAL NEUROPATHY (HCC): Chronic | ICD-10-CM

## 2024-04-15 ENCOUNTER — TELEPHONE (OUTPATIENT)
Dept: PHARMACY | Facility: CLINIC | Age: 59
End: 2024-04-15

## 2024-04-15 DIAGNOSIS — E11.42 TYPE 2 DIABETES MELLITUS WITH PERIPHERAL NEUROPATHY (HCC): Primary | ICD-10-CM

## 2024-04-15 RX ORDER — PRAVASTATIN SODIUM 40 MG
TABLET ORAL
Qty: 90 TABLET | Refills: 1 | Status: SHIPPED | OUTPATIENT
Start: 2024-04-15

## 2024-04-15 RX ORDER — EMPAGLIFLOZIN AND METFORMIN HYDROCHLORIDE 12.5; 1 MG/1; MG/1
1 TABLET ORAL 2 TIMES DAILY WITH MEALS
Qty: 180 TABLET | Refills: 1 | Status: SHIPPED | OUTPATIENT
Start: 2024-04-15

## 2024-04-15 RX ORDER — ESCITALOPRAM OXALATE 10 MG/1
10 TABLET ORAL DAILY
Qty: 90 TABLET | Refills: 1 | Status: SHIPPED | OUTPATIENT
Start: 2024-04-15

## 2024-04-15 RX ORDER — OMEPRAZOLE 20 MG/1
20 CAPSULE, DELAYED RELEASE ORAL DAILY
Qty: 90 CAPSULE | Refills: 1 | Status: SHIPPED | OUTPATIENT
Start: 2024-04-15

## 2024-04-15 NOTE — TELEPHONE ENCOUNTER
Please let pt know that I sent in the mounjaro 7.5 mg dose. As they do have it in stock. This should be a fairly easy transition over from the ozempic. Also, the pharmacist said that radah would be much chepaer than the tresiba. Would you like me to change that? Same type of insulin just different brand.

## 2024-04-15 NOTE — TELEPHONE ENCOUNTER
Gabbie Barnes, APRN - CNP  - [facility] home delivery pharmacy does have mounjaro 7.5 mg in stock  - script pended if patient has not filled 2.5 mg    - also, tresiba is $105 for 90 ds and lantus and toujeo are both $0 through [facility] delivery pharmacy. Please consider switching insulin for cost savings for the patient    Sarina Mccoy, PharmD, [facility]  [facility]  [facility] Clinical Pharmacist  Department: [phone]    For Pharmacy Admin Tracking Only    Program: [employer]  CPA in place:  No  Recommendation Provided To: Provider: 2 via Note to Provider  Intervention Detail: Dose Adjustment: 1, reason: Therapy Optimization and New Rx: 1, reason: Cost/Formulary Change  Intervention Accepted By: Provider: 1  Gap Closed?: Yes   Time Spent (min): 5

## 2024-04-15 NOTE — TELEPHONE ENCOUNTER
POPULATION HEALTH CLINICAL PHARMACY REVIEW - BE WELL WITH DIABETES: HIGH A1C  =============================================  Filipe Harper is a 58 y.o. male enrolled in the [facility] Be Well with Diabetes program.    Identified care gap(s): A1c > 8%    DM Program Prescriptions:  Current Outpatient Medications   Medication Instructions    albuterol sulfate HFA (VENTOLIN HFA) 108 (90 Base) MCG/ACT inhaler 2 puffs, Inhalation, 4 TIMES DAILY PRN    Alcohol Swabs PADS Use to test blood sugars once a day as directed    aspirin 81 mg, Oral, DAILY    BEE POLLEN PO 2 capsules, Oral, DAILY    Blood Glucose Monitoring Suppl (PRODIGY AUTOCODE BLOOD GLUCOSE) w/Device KIT Use to test once daily and as needed as directed by provider    blood glucose test strips (PRODIGY NO CODING BLOOD GLUC) strip Use to test once daily and as needed as directed by provider    Empagliflozin-metFORMIN HCl (SYNJARDY) 12.5-1000 MG TABS 1 tablet, Oral, 2 TIMES DAILY WITH MEALS    escitalopram (LEXAPRO) 10 mg, Oral, DAILY    Insulin Pen Needle (NOVOFINE PLUS) 32G X 4 MM MISC 1 each, SubCUTAneous, DAILY    Insulin Pen Needle 32G X 4 MM MISC 1 each, Does not apply, DAILY    lisinopril (PRINIVIL;ZESTRIL) 10 MG tablet TAKE 1/2 TABLET BY MOUTH QPM    Magnesium 500 MG TABS 1 tablet, Oral, DAILY    metoprolol succinate (TOPROL XL) 25 mg, Oral, DAILY    Mounjaro 5 mg, SubCUTAneous, WEEKLY    Multiple Vitamins-Minerals (ONE DAILY MULTIVITAMIN ADULT) TABS 1 tablet, Oral, EVERY OTHER DAY    omeprazole (PRILOSEC) 20 mg, Oral, DAILY    pravastatin (PRAVACHOL) 40 MG tablet TAKE ONE TABLET BY MOUTH ONE TIME A DAY AT BEDTIME FOR CHOLESTEROL    PRODIGY LANCETS 28G MISC Use to test once daily and as needed as directed by provider    semaglutide (2 MG/DOSE) (OZEMPIC) 2 mg, SubCUTAneous, WEEKLY    tadalafil (CIALIS) 5 mg, Oral, DAILY PRN    Tresiba FlexTouch 40 Units, SubCUTAneous, NIGHTLY        Allergies:  No Known Allergies     Labs:    Lab Results

## 2024-04-23 RX ORDER — INSULIN GLARGINE 300 U/ML
40 INJECTION, SOLUTION SUBCUTANEOUS NIGHTLY
Qty: 3 ADJUSTABLE DOSE PRE-FILLED PEN SYRINGE | Refills: 1 | Status: SHIPPED | OUTPATIENT
Start: 2024-04-23 | End: 2024-07-22

## 2024-04-23 NOTE — TELEPHONE ENCOUNTER
Would the tujeo be dosed the same as the tresiba? 40 units? I wasn't sure because of the 300/ml and I do not prescribe this often.

## 2024-04-23 NOTE — TELEPHONE ENCOUNTER
Gabbie Barnes, APRN - CNP  - Yes tresiba to toujeo would be unit to unit conversion. Therefore, please send 90 day prescription of tresiba 40 units daily to mail [facility]    Thank you!  Sarina Mccoy, PharmD, [facility]  [facility] Clinical Pharmacist  Department: [phone]

## 2024-04-25 ENCOUNTER — CLINICAL DOCUMENTATION (OUTPATIENT)
Dept: PHARMACY | Facility: CLINIC | Age: 59
End: 2024-04-25

## 2024-04-25 NOTE — PROGRESS NOTES
1st Quarterly Reminder sent to patient for the DM Program - See Mychart message or Letter for more information.    Sandy Boyce CphT  [facility]  Clinical Pharmacy   Department, toll free: [phone] Option #3      For Pharmacy Admin Tracking Only    Program: [employer]  CPA in place:  No  Gap Closed?: Yes   Time Spent (min): 5

## 2024-05-16 ENCOUNTER — TELEPHONE (OUTPATIENT)
Dept: PHARMACY | Facility: CLINIC | Age: 59
End: 2024-05-16

## 2024-05-16 NOTE — TELEPHONE ENCOUNTER
[facility] HEALTH CLINICAL PHARMACY REVIEW - BE WELL WITH DIABETES: HIGH A1C  =============================================  Filipe Harper is a 58 y.o. male enrolled in the [facility] Be Well with Diabetes program.    Identified care gap(s): A1c > 8%    DM Program Prescriptions:  Current Outpatient Medications   Medication Instructions    albuterol sulfate HFA (VENTOLIN HFA) 108 (90 Base) MCG/ACT inhaler 2 puffs, Inhalation, 4 TIMES DAILY PRN    Alcohol Swabs PADS Use to test blood sugars once a day as directed    aspirin 81 mg, Oral, DAILY    BEE POLLEN PO 2 capsules, Oral, DAILY    Blood Glucose Monitoring Suppl (PRODIGY AUTOCODE BLOOD GLUCOSE) w/Device KIT Use to test once daily and as needed as directed by provider    blood glucose test strips (PRODIGY NO CODING BLOOD GLUC) strip Use to test once daily and as needed as directed by provider    Empagliflozin-metFORMIN HCl (SYNJARDY) 12.5-1000 MG TABS 1 tablet, Oral, 2 TIMES DAILY WITH MEALS    escitalopram (LEXAPRO) 10 mg, Oral, DAILY    Insulin Pen Needle (NOVOFINE PLUS) 32G X 4 MM MISC 1 each, SubCUTAneous, DAILY    Insulin Pen Needle 32G X 4 MM MISC 1 each, Does not apply, DAILY    lisinopril (PRINIVIL;ZESTRIL) 10 MG tablet TAKE 1/2 TABLET BY MOUTH QPM    Magnesium 500 MG TABS 1 tablet, Oral, DAILY    metoprolol succinate (TOPROL XL) 25 mg, Oral, DAILY    Multiple Vitamins-Minerals (ONE DAILY MULTIVITAMIN ADULT) TABS 1 tablet, Oral, EVERY OTHER DAY    omeprazole (PRILOSEC) 20 mg, Oral, DAILY    pravastatin (PRAVACHOL) 40 MG tablet TAKE ONE TABLET BY MOUTH ONE TIME A DAY AT BEDTIME FOR CHOLESTEROL    PRODIGY LANCETS 28G MISC Use to test once daily and as needed as directed by provider    semaglutide (2 MG/DOSE) (OZEMPIC) 2 mg, SubCUTAneous, WEEKLY    tadalafil (CIALIS) 5 mg, Oral, DAILY PRN    Tirzepatide (MOUNJARO) 7.5 mg, SubCUTAneous, WEEKLY    Toujeo [employer] 40 Units, SubCUTAneous, NIGHTLY    Tresiba FlexTouch 40 Units, SubCUTAneous, NIGHTLY

## 2024-05-17 DIAGNOSIS — E11.42 TYPE 2 DIABETES MELLITUS WITH PERIPHERAL NEUROPATHY (HCC): Chronic | ICD-10-CM

## 2024-05-17 NOTE — TELEPHONE ENCOUNTER
Gabbie Barnes, APRN - CNP  - patient requesting refills for glucometer supplies, pended    Thank you,  Sarina Mccoy, PharmD, [facility]  [facility]  [facility] Clinical Pharmacist  Department: [phone]

## 2024-05-17 NOTE — TELEPHONE ENCOUNTER
Dr Huitron-  - Patient requesting refill of lisinopril (he states having a hard time cutting the 10 mg tabs in half)  - script pended for lisinopril 5 mg tablets daily    Thank you,  Sairna Mccoy, PharmD, [facility]  [facility] Clinical Pharmacist  Department: [phone]

## 2024-05-20 RX ORDER — BLOOD SUGAR DIAGNOSTIC
STRIP MISCELLANEOUS
Qty: 100 EACH | Refills: 3 | Status: SHIPPED | OUTPATIENT
Start: 2024-05-20

## 2024-05-20 RX ORDER — LISINOPRIL 5 MG/1
5 TABLET ORAL DAILY
Qty: 90 TABLET | Refills: 1 | Status: SHIPPED | OUTPATIENT
Start: 2024-05-20

## 2024-05-20 RX ORDER — BLOOD-GLUCOSE CONTROL, LOW
EACH MISCELLANEOUS
Qty: 100 EACH | Refills: 3 | Status: SHIPPED | OUTPATIENT
Start: 2024-05-20

## 2024-05-31 ENCOUNTER — CARE COORDINATION (OUTPATIENT)
Dept: OTHER | Facility: CLINIC | Age: 59
End: 2024-05-31

## 2024-06-10 ENCOUNTER — CARE COORDINATION (OUTPATIENT)
Dept: OTHER | Facility: CLINIC | Age: 59
End: 2024-06-10

## 2024-06-10 NOTE — CARE COORDINATION
Ambulatory Care Coordination Note    ACM attempted to reach patient for introduction to Associate Care Management related to DM. HIPAA compliant message left requesting a return phone call at patient convenience.     Plan for follow-up call in 3-5 days    - How is Tresiba 40 units going QAM?  - Compliance- insulin  -Mounjaro, new as of May 2024.  - DM education        Future Appointments   Date Time Provider Department Center   6/24/2024  2:30 PM Gabbie Barnes APRN - [facility] Liliam Richard LPN- Care Coordinator  Associate Care Management  [address], [place]  [address]  Phone: [phone]  [email or web address] Regional [facility]

## 2024-06-21 SDOH — ECONOMIC STABILITY: INCOME INSECURITY: HOW HARD IS IT FOR YOU TO PAY FOR THE VERY BASICS LIKE FOOD, HOUSING, MEDICAL CARE, AND HEATING?: VERY HARD

## 2024-06-21 SDOH — ECONOMIC STABILITY: HOUSING INSECURITY
IN THE LAST 12 MONTHS, WAS THERE A TIME WHEN YOU DID NOT HAVE A STEADY PLACE TO SLEEP OR SLEPT IN A SHELTER (INCLUDING NOW)?: NO

## 2024-06-21 SDOH — ECONOMIC STABILITY: FOOD INSECURITY: WITHIN THE PAST 12 MONTHS, YOU WORRIED THAT YOUR FOOD WOULD RUN OUT BEFORE YOU GOT MONEY TO BUY MORE.: OFTEN TRUE

## 2024-06-21 SDOH — ECONOMIC STABILITY: FOOD INSECURITY: WITHIN THE PAST 12 MONTHS, THE FOOD YOU BOUGHT JUST DIDN'T LAST AND YOU DIDN'T HAVE MONEY TO GET MORE.: OFTEN TRUE

## 2024-06-21 SDOH — ECONOMIC STABILITY: TRANSPORTATION INSECURITY
IN THE PAST 12 MONTHS, HAS LACK OF TRANSPORTATION KEPT YOU FROM MEETINGS, WORK, OR FROM GETTING THINGS NEEDED FOR DAILY LIVING?: NO

## 2024-06-24 ENCOUNTER — OFFICE VISIT (OUTPATIENT)
Dept: FAMILY MEDICINE CLINIC | Age: 59
End: 2024-06-24
Payer: COMMERCIAL

## 2024-06-24 VITALS
HEIGHT: 65 IN | HEART RATE: 81 BPM | TEMPERATURE: 97.9 F | OXYGEN SATURATION: 96 % | BODY MASS INDEX: 39.55 KG/M2 | SYSTOLIC BLOOD PRESSURE: 138 MMHG | WEIGHT: 237.4 LBS | DIASTOLIC BLOOD PRESSURE: 60 MMHG

## 2024-06-24 DIAGNOSIS — E11.42 TYPE 2 DIABETES MELLITUS WITH PERIPHERAL NEUROPATHY (HCC): Primary | ICD-10-CM

## 2024-06-24 LAB — HBA1C MFR BLD: 8.2 %

## 2024-06-24 PROCEDURE — 3046F HEMOGLOBIN A1C LEVEL >9.0%: CPT | Performed by: NURSE PRACTITIONER

## 2024-06-24 PROCEDURE — 99214 OFFICE O/P EST MOD 30 MIN: CPT | Performed by: NURSE PRACTITIONER

## 2024-06-24 PROCEDURE — 83036 HEMOGLOBIN GLYCOSYLATED A1C: CPT | Performed by: NURSE PRACTITIONER

## 2024-06-24 RX ORDER — INSULIN GLARGINE 300 U/ML
INJECTION, SOLUTION SUBCUTANEOUS
Qty: 3 ADJUSTABLE DOSE PRE-FILLED PEN SYRINGE | Refills: 1 | Status: SHIPPED | OUTPATIENT
Start: 2024-06-24

## 2024-06-24 RX ORDER — TIRZEPATIDE 10 MG/.5ML
10 INJECTION, SOLUTION SUBCUTANEOUS WEEKLY
Qty: 12 ADJUSTABLE DOSE PRE-FILLED PEN SYRINGE | Refills: 1 | Status: SHIPPED | OUTPATIENT
Start: 2024-06-24

## 2024-06-24 RX ORDER — SILDENAFIL 100 MG/1
100 TABLET, FILM COATED ORAL DAILY PRN
Qty: 10 TABLET | Refills: 5 | Status: SHIPPED | OUTPATIENT
Start: 2024-06-24

## 2024-06-24 ASSESSMENT — ENCOUNTER SYMPTOMS
DIARRHEA: 0
CONSTIPATION: 0
SHORTNESS OF BREATH: 0
COUGH: 0

## 2024-06-24 NOTE — PROGRESS NOTES
Subjective  Chief Complaint   Patient presents with    3 Month Follow-Up     Pt. States that he is due for a POCT a1c    Diabetes       HPI    Pt is here for a follow up of DM.  Last A1c was over 10%  We changed over to mounjaro and he has been working some on diet.  A1c now 8.2%.  Tolerating the mounjaro well.   No GI side effects.  Taking 60 units of toujeo nightly.    Overall pt states that he is feeling well.    Only complaint is cialis is not working like it used to. Asking about changing med.     Patient Active Problem List    Diagnosis Date Noted    Type 2 diabetes mellitus with microalbuminuria ([facility]) 09/26/2014    Encounter for long-term (current) insulin use ([facility]) 09/26/2014    Hyperlipidemia 09/26/2014    Severe obesity (BMI 35.0-39.9) with comorbidity ([facility]) 03/30/2023    Chronic obstructive pulmonary disease ([facility]) 03/30/2023    Left leg paresthesias 02/11/2019    Erectile dysfunction associated with type 2 diabetes mellitus ([facility]) 02/09/2018    Tear of right retina without detachment 02/14/2014    Type 2 diabetes mellitus with peripheral neuropathy ([facility]) 04/10/2013    Diabetic peripheral neuropathy associated with type 2 diabetes mellitus ([facility]) 04/10/2013    Periodic limb movement disorder 03/08/2009    Obstructive sleep apnea 03/08/2009    History of peptic ulcer disease 06/01/1995     Past Medical History:   Diagnosis Date    Diabetes mellitus (HCC)     Hypertension     Sleep apnea      Past Surgical History:   Procedure Laterality Date    COLONOSCOPY N/A 1/25/2024    Colonoscopy possible biopsy/phone PAT performed [place] [facility] [place]    TONSILLECTOMY      WISDOM TOOTH EXTRACTION       Family History   Problem Relation Age of Onset    Gout Mother     Asthma Mother     High Blood Pressure Mother     Heart Disease Father     Cancer Father     Diabetes Father     Cancer Paternal Uncle     Diabetes Paternal Grandmother     Breast Cancer Neg Hx     Colon Cancer Neg Hx     Heart Attack Neg

## 2024-07-06 DIAGNOSIS — E11.42 TYPE 2 DIABETES MELLITUS WITH PERIPHERAL NEUROPATHY (HCC): Chronic | ICD-10-CM

## 2024-07-06 DIAGNOSIS — F32.A DEPRESSION, UNSPECIFIED DEPRESSION TYPE: ICD-10-CM

## 2024-07-08 RX ORDER — OMEPRAZOLE 20 MG/1
20 CAPSULE, DELAYED RELEASE ORAL DAILY
Qty: 90 CAPSULE | Refills: 1 | Status: SHIPPED | OUTPATIENT
Start: 2024-07-08

## 2024-07-08 RX ORDER — PRAVASTATIN SODIUM 40 MG
TABLET ORAL
Qty: 90 TABLET | Refills: 1 | Status: SHIPPED | OUTPATIENT
Start: 2024-07-08

## 2024-07-08 RX ORDER — EMPAGLIFLOZIN AND METFORMIN HYDROCHLORIDE 12.5; 1 MG/1; MG/1
1 TABLET ORAL 2 TIMES DAILY WITH MEALS
Qty: 180 TABLET | Refills: 1 | Status: SHIPPED | OUTPATIENT
Start: 2024-07-08

## 2024-07-08 RX ORDER — ESCITALOPRAM OXALATE 10 MG/1
10 TABLET ORAL DAILY
Qty: 90 TABLET | Refills: 1 | Status: SHIPPED | OUTPATIENT
Start: 2024-07-08

## 2024-07-08 NOTE — TELEPHONE ENCOUNTER
Comments:     Last Office Visit (last PCP visit):   6/24/2024    Next Visit Date:  Future Appointments   Date Time Provider Department Center   7/11/2024 11:00 AM Charan Bay MD [facility]   9/24/2024  2:30 PM Gabbie Barnes, APRN - CNP [facility]       **If hasn't been seen in over a year OR hasn't followed up according to last diabetes/ADHD visit, make appointment for patient before sending refill to provider.    Rx requested:  Requested Prescriptions     Pending Prescriptions Disp Refills    Empagliflozin-metFORMIN HCl (SYNJARDY) 12.5-1000 MG TABS 180 tablet 1     Sig: Take 1 tablet by mouth 2 times daily (with meals)    pravastatin (PRAVACHOL) 40 MG tablet 90 tablet 1     Sig: TAKE ONE TABLET BY MOUTH ONE TIME A DAY AT BEDTIME FOR CHOLESTEROL    escitalopram (LEXAPRO) 10 MG tablet 90 tablet 1     Sig: Take 1 tablet by mouth daily    omeprazole (PRILOSEC) 20 MG delayed release capsule 90 capsule 1     Sig: Take 1 capsule by mouth Daily

## 2024-07-11 ENCOUNTER — OFFICE VISIT (OUTPATIENT)
Dept: SURGERY | Age: 59
End: 2024-07-11
Payer: COMMERCIAL

## 2024-07-11 VITALS
HEIGHT: 65 IN | OXYGEN SATURATION: 97 % | WEIGHT: 236 LBS | BODY MASS INDEX: 39.32 KG/M2 | DIASTOLIC BLOOD PRESSURE: 60 MMHG | SYSTOLIC BLOOD PRESSURE: 138 MMHG | TEMPERATURE: 97.5 F | HEART RATE: 82 BPM

## 2024-07-11 DIAGNOSIS — K40.90 NON-RECURRENT UNILATERAL INGUINAL HERNIA WITHOUT OBSTRUCTION OR GANGRENE: Primary | ICD-10-CM

## 2024-07-11 PROCEDURE — 99203 OFFICE O/P NEW LOW 30 MIN: CPT | Performed by: SURGERY

## 2024-07-12 ENCOUNTER — HOSPITAL ENCOUNTER (OUTPATIENT)
Dept: ULTRASOUND IMAGING | Age: 59
Discharge: HOME | End: 2024-07-12
Payer: COMMERCIAL

## 2024-07-12 DIAGNOSIS — K40.90 NON-RECURRENT UNILATERAL INGUINAL HERNIA WITHOUT OBSTRUCTION OR GANGRENE: ICD-10-CM

## 2024-07-12 PROCEDURE — 76999 ECHO EXAMINATION PROCEDURE: CPT

## 2024-07-25 ENCOUNTER — CARE COORDINATION (OUTPATIENT)
Dept: OTHER | Facility: CLINIC | Age: 59
End: 2024-07-25

## 2024-07-25 NOTE — CARE COORDINATION
PCP or specialists or any new or worsening changes.   Confidence: 10/10  Anticipated Goal Completion Date: 10/1/24               PCP/Specialist follow up:   Future Appointments         Provider Specialty Dept Phone    9/24/2024 2:30 PM Gabbie Barnes APRN - CNP Family Medicine [phone]            Follow Up:   Plan for next ACM outreach in approximately 1 month to complete:  - goal progression.   Patient  is agreeable to this plan.        Philly Richard LPN- Care Coordinator  Associate Care Management  [address] [place] [address]  [address]  Phone: [phone]  [email or web address] [employer]

## 2024-08-13 ENCOUNTER — CLINICAL DOCUMENTATION (OUTPATIENT)
Dept: PHARMACY | Facility: CLINIC | Age: 59
End: 2024-08-13

## 2024-08-13 ENCOUNTER — PATIENT MESSAGE (OUTPATIENT)
Dept: PHARMACY | Facility: CLINIC | Age: 59
End: 2024-08-13

## 2024-08-13 NOTE — PROGRESS NOTES
2nd Quarterly Reminder sent to patient for the DM Program - See Mychart message or Letter for more information.      Michelle Rodrigues [facility]  Clinical Pharmacy   Phone: [phone], Option #3      For Pharmacy Admin Tracking Only    Program: [employer]  CPA in place:  No  Gap Closed?: Yes   Time Spent (min): 5

## 2024-08-22 NOTE — TELEPHONE ENCOUNTER
[employer] message not read by patient.  Letter mailed to patient with the information from the [employer] message.    Juana Torres [facility]  Clinical Pharmacy   Department, toll free: [phone] Option #3    For Pharmacy Admin Tracking Only    Program: [employer]  CPA in place:  No  Gap Closed?: Yes   Time Spent (min): 5

## 2024-09-07 DIAGNOSIS — E11.42 TYPE 2 DIABETES MELLITUS WITH PERIPHERAL NEUROPATHY (HCC): Chronic | ICD-10-CM

## 2024-09-07 DIAGNOSIS — F32.A DEPRESSION, UNSPECIFIED DEPRESSION TYPE: ICD-10-CM

## 2024-09-08 RX ORDER — ASPIRIN 81 MG/1
81 TABLET ORAL DAILY
Qty: 90 TABLET | Refills: 1 | Status: SHIPPED | OUTPATIENT
Start: 2024-09-08

## 2024-09-09 RX ORDER — METOPROLOL SUCCINATE 25 MG/1
25 TABLET, EXTENDED RELEASE ORAL DAILY
Qty: 90 TABLET | Refills: 0 | Status: SHIPPED | OUTPATIENT
Start: 2024-09-09

## 2024-09-09 RX ORDER — ESCITALOPRAM OXALATE 10 MG/1
10 TABLET ORAL DAILY
Qty: 90 TABLET | Refills: 1 | Status: SHIPPED | OUTPATIENT
Start: 2024-09-09

## 2024-09-09 RX ORDER — LISINOPRIL 5 MG/1
5 TABLET ORAL DAILY
Qty: 90 TABLET | Refills: 0 | Status: SHIPPED | OUTPATIENT
Start: 2024-09-09

## 2024-09-09 RX ORDER — INSULIN GLARGINE 300 U/ML
INJECTION, SOLUTION SUBCUTANEOUS
Qty: 3 ADJUSTABLE DOSE PRE-FILLED PEN SYRINGE | Refills: 1 | Status: SHIPPED | OUTPATIENT
Start: 2024-09-09

## 2024-09-09 RX ORDER — PRAVASTATIN SODIUM 40 MG
TABLET ORAL
Qty: 90 TABLET | Refills: 1 | Status: SHIPPED | OUTPATIENT
Start: 2024-09-09

## 2024-09-20 DIAGNOSIS — E78.5 HYPERLIPIDEMIA, UNSPECIFIED HYPERLIPIDEMIA TYPE: ICD-10-CM

## 2024-09-20 DIAGNOSIS — E11.42 TYPE 2 DIABETES MELLITUS WITH PERIPHERAL NEUROPATHY (HCC): ICD-10-CM

## 2024-09-20 LAB
ALBUMIN SERPL-MCNC: 4.3 G/DL (ref 3.5–4.6)
ALP SERPL-CCNC: 72 U/L (ref 35–104)
ALT SERPL-CCNC: 17 U/L (ref 0–41)
ANION GAP SERPL CALCULATED.3IONS-SCNC: 15 MEQ/L (ref 9–15)
AST SERPL-CCNC: 22 U/L (ref 0–40)
BILIRUB SERPL-MCNC: 0.3 MG/DL (ref 0.2–0.7)
BUN SERPL-MCNC: 15 MG/DL (ref 6–20)
CALCIUM SERPL-MCNC: 9.4 MG/DL (ref 8.5–9.9)
CHLORIDE SERPL-SCNC: 98 MEQ/L (ref 95–107)
CO2 SERPL-SCNC: 22 MEQ/L (ref 20–31)
CREAT SERPL-MCNC: 0.69 MG/DL (ref 0.7–1.2)
GLOBULIN SER CALC-MCNC: 2.8 G/DL (ref 2.3–3.5)
GLUCOSE SERPL-MCNC: 99 MG/DL (ref 70–99)
POTASSIUM SERPL-SCNC: 4.6 MEQ/L (ref 3.4–4.9)
PROT SERPL-MCNC: 7.1 G/DL (ref 6.3–8)
SODIUM SERPL-SCNC: 135 MEQ/L (ref 135–144)
TRIGL SERPL-MCNC: 221 MG/DL (ref 0–150)

## 2024-09-20 NOTE — PROGRESS NOTES
Current packs/day: 1.00     Average packs/day: 1 pack/day for 34.8 years (34.8 ttl pk-yrs)     Types: Cigarettes     Start date: 9/25/1989    Smokeless tobacco: Never   Vaping Use    Vaping Use: Never used   Substance Use Topics    Alcohol use: Yes     Comment: rare    Drug use: Never       Review of Systems:  General negative  Denies any fever or chills  HEENT negative  Denies any diplopia, tinnitus or vertigo  Resp negative  Denies any shortness of breath, cough or wheezing  Cardiac negative  Denies any chest pain, palpitations, claudication or edema  GI Normal  Denies any melena, hematochezia, hematemesis or pyrosis   negative  Denies any frequency, urgency, hesitancy or incontinence  Heme negative  Denies bruising or bleeding easily  Endocrine negative, Denies any history of diabetes or thyroid disease  Neuro negative  Denies any focal motor or sensory deficits    OBJECTIVE:   CURRENT VITALS: /60   Pulse 82   Temp 97.5 °F (36.4 °C)   Ht 1.651 m (5' 5\")   Wt 107 kg (236 lb)   SpO2 97%   BMI 39.27 kg/m²      GEN: Alert and oriented x3, no acute distress, cooperative   SKIN: Skin color, texture, turgor normal. No rashes or lesions  LYMPH: No inguinal nodes  HEENT: Head is normocephalic, atraumatic. EOMI  NECK: Supple, symmetrical, trachea midline, skin normal  PULM: Chest symmetric, clear to auscultation bilaterally without wheezes, rales or rhonchi. No increased work of breathing or accessory muscle use  CV: Heart regular rate and rhythm, no murmurs appreciated  ABD: Soft, nontender, nondistended, no palpable masses  GROIN: Possible right inguinal hernia, no palapble left inguinal defects  NEURO: No focalizing motor or sensory deficits   EXTREMITIES: Warm, dry, no lower extremity edema    LABS:   No results for input(s): \"WBC\", \"HGB\", \"HCT\", \"PLT\", \"NA\", \"K\", \"CL\", \"CO2\", \"BUN\", \"CREATININE\", \"MG\", \"PHOS\", \"CALCIUM\", \"INR\", \"AST\", \"ALT\", \"BILITOT\", \"BILIDIR\", \"AMYLASE\", \"LIPASE\", \"LDH\", \"LACTA\",  Patient expressed no known problems or needs

## 2024-09-21 LAB
ESTIMATED AVERAGE GLUCOSE: 206 MG/DL
HBA1C MFR BLD: 8.8 % (ref 4–6)

## 2024-09-24 ENCOUNTER — TELEMEDICINE (OUTPATIENT)
Dept: FAMILY MEDICINE CLINIC | Age: 59
End: 2024-09-24
Payer: COMMERCIAL

## 2024-09-24 DIAGNOSIS — R80.9 TYPE 2 DIABETES MELLITUS WITH MICROALBUMINURIA, WITH LONG-TERM CURRENT USE OF INSULIN (HCC): ICD-10-CM

## 2024-09-24 DIAGNOSIS — Z79.4 ENCOUNTER FOR LONG-TERM (CURRENT) INSULIN USE (HCC): ICD-10-CM

## 2024-09-24 DIAGNOSIS — E78.5 HYPERLIPIDEMIA, UNSPECIFIED HYPERLIPIDEMIA TYPE: ICD-10-CM

## 2024-09-24 DIAGNOSIS — F32.A DEPRESSION, UNSPECIFIED DEPRESSION TYPE: ICD-10-CM

## 2024-09-24 DIAGNOSIS — E11.42 TYPE 2 DIABETES MELLITUS WITH PERIPHERAL NEUROPATHY (HCC): Primary | ICD-10-CM

## 2024-09-24 DIAGNOSIS — J06.9 VIRAL URI: ICD-10-CM

## 2024-09-24 DIAGNOSIS — E11.29 TYPE 2 DIABETES MELLITUS WITH MICROALBUMINURIA, WITH LONG-TERM CURRENT USE OF INSULIN (HCC): ICD-10-CM

## 2024-09-24 DIAGNOSIS — Z79.4 TYPE 2 DIABETES MELLITUS WITH MICROALBUMINURIA, WITH LONG-TERM CURRENT USE OF INSULIN (HCC): ICD-10-CM

## 2024-09-24 PROCEDURE — 99214 OFFICE O/P EST MOD 30 MIN: CPT | Performed by: NURSE PRACTITIONER

## 2024-09-24 PROCEDURE — 3052F HG A1C>EQUAL 8.0%<EQUAL 9.0%: CPT | Performed by: NURSE PRACTITIONER

## 2024-09-24 ASSESSMENT — ENCOUNTER SYMPTOMS
SORE THROAT: 0
SHORTNESS OF BREATH: 0
COUGH: 1
SINUS PAIN: 1
RHINORRHEA: 1
DIARRHEA: 0
SINUS PRESSURE: 1
CONSTIPATION: 0

## 2024-09-25 ENCOUNTER — CARE COORDINATION (OUTPATIENT)
Dept: OTHER | Facility: CLINIC | Age: 59
End: 2024-09-25

## 2024-09-30 ENCOUNTER — PATIENT MESSAGE (OUTPATIENT)
Dept: PHARMACY | Facility: CLINIC | Age: 59
End: 2024-09-30

## 2024-10-08 ENCOUNTER — CARE COORDINATION (OUTPATIENT)
Dept: OTHER | Facility: CLINIC | Age: 59
End: 2024-10-08

## 2024-10-08 NOTE — CARE COORDINATION
Patient has graduated from the Disease Specific Care Management  program on 10/08/2024.  Patient/family has the ability to self-manage at this time.  Care management goals have been completed. No further Ambulatory Care Manager follow up scheduled.     Goals Addressed                      This Visit's Progress      COMPLETED: Patient Stated (pt-stated)         I will continue to stay on track with my DM medications and diet in order to lower my A1C.    Barriers: adherence  Plan for overcoming my barriers: I will utilize the resources provided to me by my ACM and notify my PCP or specialists or any new or worsening changes.   Confidence: 10/10  Anticipated Goal Completion Date: 10/1/24              Patient has Ambulatory Care Manager's contact information for any further questions, concerns, or needs.  Patients upcoming visits:    Future Appointments   Date Time Provider Department Center   12/18/2024  2:30 PM Gabbie Barnes APRN - [facility] DEP        Philly Richard LPN- Care Coordinator  Associate Care Management  [address] [place] [address]  [address]  Phone: [phone]  [email or web address], [email or web address] Mountain Hospital, Inc[facility]

## 2024-10-09 NOTE — TELEPHONE ENCOUNTER
[employer] message not read. Sending letter.     Michelle Rodrigues [facility]  Clinical Pharmacy   Phone: [phone], Option #3

## 2024-10-17 DIAGNOSIS — E11.42 TYPE 2 DIABETES MELLITUS WITH PERIPHERAL NEUROPATHY (HCC): ICD-10-CM

## 2024-10-17 RX ORDER — INSULIN GLARGINE 300 U/ML
INJECTION, SOLUTION SUBCUTANEOUS
Qty: 3 ADJUSTABLE DOSE PRE-FILLED PEN SYRINGE | Refills: 1 | Status: SHIPPED | OUTPATIENT
Start: 2024-10-17

## 2024-10-17 RX ORDER — TIRZEPATIDE 10 MG/.5ML
10 INJECTION, SOLUTION SUBCUTANEOUS WEEKLY
Qty: 12 ADJUSTABLE DOSE PRE-FILLED PEN SYRINGE | Refills: 1 | Status: SHIPPED | OUTPATIENT
Start: 2024-10-17

## 2024-10-17 NOTE — TELEPHONE ENCOUNTER
Comments:     Last Office Visit (last PCP visit):   9/24/2024    Next Visit Date:  Future Appointments   Date Time Provider Department Center   12/18/2024  2:30 PM Gabbie Barnes, NATALEE - CNP [facility] DEP       **If hasn't been seen in over a year OR hasn't followed up according to last diabetes/ADHD visit, make appointment for patient before sending refill to provider.    Rx requested:  Requested Prescriptions     Pending Prescriptions Disp Refills    Tirzepatide (MOUNJARO) 10 MG/0.5ML SOPN SC injection 12 Adjustable Dose Pre-filled Pen Syringe 1     Sig: Inject 0.5 mLs into the skin once a week    insulin glargine, 1 unit dial, (TOUJEO SOLOSTAR) 300 UNIT/ML concentrated injection pen 3 Adjustable Dose Pre-filled Pen Syringe 1     Sig: Inject 60 units nightly subQ

## 2024-10-23 ENCOUNTER — TELEPHONE (OUTPATIENT)
Dept: PHARMACY | Facility: CLINIC | Age: 59
End: 2024-10-23

## 2024-11-11 RX ORDER — EMPAGLIFLOZIN AND METFORMIN HYDROCHLORIDE 12.5; 1 MG/1; MG/1
1 TABLET ORAL 2 TIMES DAILY WITH MEALS
Qty: 180 TABLET | Refills: 1 | Status: SHIPPED | OUTPATIENT
Start: 2024-11-11

## 2024-11-11 NOTE — TELEPHONE ENCOUNTER
Comments:     Last Office Visit (last PCP visit):   9/24/2024    Next Visit Date:  Future Appointments   Date Time Provider Department Center   12/18/2024  2:30 PM Gabbie Barnes APRN - CNP [facility] DEP       **If hasn't been seen in over a year OR hasn't followed up according to last diabetes/ADHD visit, make appointment for patient before sending refill to provider.    Rx requested:  Requested Prescriptions     Pending Prescriptions Disp Refills    Empagliflozin-metFORMIN HCl (SYNJARDY) 12.5-1000 MG TABS 180 tablet 1     Sig: Take 1 tablet by mouth 2 times daily (with meals)

## 2024-11-15 ENCOUNTER — NURSE ONLY (OUTPATIENT)
Dept: FAMILY MEDICINE CLINIC | Age: 59
End: 2024-11-15
Payer: COMMERCIAL

## 2024-11-15 VITALS — DIASTOLIC BLOOD PRESSURE: 82 MMHG | SYSTOLIC BLOOD PRESSURE: 148 MMHG

## 2024-11-15 DIAGNOSIS — E78.5 HYPERLIPIDEMIA, UNSPECIFIED HYPERLIPIDEMIA TYPE: ICD-10-CM

## 2024-11-15 DIAGNOSIS — Z23 NEED FOR INFLUENZA VACCINATION: Primary | ICD-10-CM

## 2024-11-15 LAB
CHOLEST SERPL-MCNC: 139 MG/DL (ref 0–199)
HDLC SERPL-MCNC: 27 MG/DL (ref 40–59)
LDLC SERPL CALC-MCNC: 67 MG/DL (ref 0–129)
TRIGL SERPL-MCNC: 227 MG/DL (ref 0–150)

## 2024-11-15 PROCEDURE — 90661 CCIIV3 VAC ABX FR 0.5 ML IM: CPT | Performed by: NURSE PRACTITIONER

## 2024-11-15 PROCEDURE — 90471 IMMUNIZATION ADMIN: CPT | Performed by: NURSE PRACTITIONER

## 2024-11-15 NOTE — PROGRESS NOTES
After obtaining consent, and per orders of Shashi Caro,SHERRILL injection of FLU given in LT deltoid by Brandy Lemon MA. Patient instructed to remain in clinic for 20 minutes afterwards, and to report any adverse reaction to me immediately. Tolerated well

## 2024-12-15 DIAGNOSIS — E11.42 TYPE 2 DIABETES MELLITUS WITH PERIPHERAL NEUROPATHY (HCC): ICD-10-CM

## 2024-12-15 RX ORDER — METOPROLOL SUCCINATE 25 MG/1
25 TABLET, EXTENDED RELEASE ORAL DAILY
Qty: 90 TABLET | Refills: 0 | Status: SHIPPED | OUTPATIENT
Start: 2024-12-15

## 2024-12-15 RX ORDER — INSULIN GLARGINE 300 U/ML
INJECTION, SOLUTION SUBCUTANEOUS
Qty: 3 ADJUSTABLE DOSE PRE-FILLED PEN SYRINGE | Refills: 1 | Status: SHIPPED | OUTPATIENT
Start: 2024-12-15

## 2024-12-15 NOTE — TELEPHONE ENCOUNTER
Patient needs follow up appt scheduled with Dr. Huitron.    Requesting medication refill. Please approve or deny this request.    Rx requested:  Requested Prescriptions     Pending Prescriptions Disp Refills    metoprolol succinate (TOPROL XL) 25 MG extended release tablet 90 tablet 0     Sig: Take 1 tablet by mouth daily         Last Office Visit:   2/7/2024      Next Visit Date:  Future Appointments   Date Time Provider Department Center   12/18/2024  2:30 PM Gabbie Barnes, APRN - CNP [facility] DEP               Last refill 09/09/2024. Please approve or deny.

## 2024-12-18 ENCOUNTER — OFFICE VISIT (OUTPATIENT)
Dept: FAMILY MEDICINE CLINIC | Age: 59
End: 2024-12-18
Payer: COMMERCIAL

## 2024-12-18 VITALS
BODY MASS INDEX: 38.69 KG/M2 | WEIGHT: 232.2 LBS | DIASTOLIC BLOOD PRESSURE: 70 MMHG | TEMPERATURE: 97.6 F | OXYGEN SATURATION: 96 % | SYSTOLIC BLOOD PRESSURE: 136 MMHG | HEART RATE: 83 BPM | HEIGHT: 65 IN

## 2024-12-18 DIAGNOSIS — E11.42 TYPE 2 DIABETES MELLITUS WITH PERIPHERAL NEUROPATHY (HCC): Primary | ICD-10-CM

## 2024-12-18 DIAGNOSIS — Z87.891 PERSONAL HISTORY OF TOBACCO USE: ICD-10-CM

## 2024-12-18 DIAGNOSIS — E78.5 HYPERLIPIDEMIA, UNSPECIFIED HYPERLIPIDEMIA TYPE: ICD-10-CM

## 2024-12-18 LAB — HBA1C MFR BLD: 7.8 %

## 2024-12-18 PROCEDURE — 99213 OFFICE O/P EST LOW 20 MIN: CPT | Performed by: NURSE PRACTITIONER

## 2024-12-18 PROCEDURE — 3051F HG A1C>EQUAL 7.0%<8.0%: CPT | Performed by: NURSE PRACTITIONER

## 2024-12-18 PROCEDURE — 83036 HEMOGLOBIN GLYCOSYLATED A1C: CPT | Performed by: NURSE PRACTITIONER

## 2024-12-18 RX ORDER — TIRZEPATIDE 10 MG/.5ML
10 INJECTION, SOLUTION SUBCUTANEOUS WEEKLY
Qty: 6 ML | Refills: 1 | Status: SHIPPED | OUTPATIENT
Start: 2024-12-18

## 2024-12-18 ASSESSMENT — ENCOUNTER SYMPTOMS
SHORTNESS OF BREATH: 0
COUGH: 0

## 2024-12-18 NOTE — PROGRESS NOTES
Discussed with the patient the current USPSTF guidelines released March 9, 2021 for screening for lung cancer.    For adults aged 50 to 80 years who have a 20 pack-year smoking history and currently smoke or have quit within the past 15 years the grade B recommendation is to:  Screen for lung cancer with low-dose computed tomography (LDCT) every year.  Stop screening once a person has not smoked for 15 years or has a health problem that limits life expectancy or the ability to have lung surgery.    The patient  reports that he has been smoking cigarettes. He started smoking about 35 years ago. He has a 35.2 pack-year smoking history. He has never used smokeless tobacco.. Discussed with patient the risks and benefits of screening, including over-diagnosis, false positive rate, and total radiation exposure.  The patient currently exhibits no signs or symptoms suggestive of lung cancer.  Discussed with patient the importance of compliance with yearly annual lung cancer screenings and willingness to undergo diagnosis and treatment if screening scan is positive.  In addition, the patient was counseled regarding the importance of remaining smoke free and/or total smoking cessation.    Also reviewed the following if the patient has Medicare that as of February 10, 2022, Medicare only covers LDCT screening in patients aged 50-77 with at least a 20 pack-year smoking history who currently smoke or have quit in the last 15 years  
Blood Glucose Monitoring Suppl (PRODIGY AUTOCODE BLOOD GLUCOSE) w/Device KIT Use to test once daily and as needed as directed by provider (Patient not taking: Reported on 9/24/2024) 1 kit 0     No current facility-administered medications on file prior to visit.     No Known Allergies    Review of Systems   Constitutional:  Negative for fatigue.   Respiratory:  Negative for cough and shortness of breath.    Cardiovascular:  Negative for chest pain.   Psychiatric/Behavioral:  The patient is nervous/anxious.        Pertinent ROS findings as noted in above HPI. Assume all other ROS are negative.     Objective  Vitals:    12/18/24 1425 12/18/24 1432 12/18/24 1442   BP: (!) 148/82 (!) 142/82 136/70   Site: Left Upper Arm Left Upper Arm    Position: Sitting Sitting    Cuff Size: Large Adult Large Adult    Pulse: 83     Temp: 97.6 °F (36.4 °C)     SpO2: 96%     Weight: 105.3 kg (232 lb 3.2 oz)     Height: 1.651 m (5' 5\")       Physical Exam  Vitals and nursing note reviewed.   Constitutional:       General: He is not in acute distress.     Appearance: Normal appearance. He is well-developed. He is obese.   HENT:      Head: Normocephalic.      Right Ear: External ear normal.      Left Ear: External ear normal.      Nose: Nose normal.      Mouth/Throat:      Mouth: Mucous membranes are moist.   Eyes:      Conjunctiva/sclera: Conjunctivae normal.      Pupils: Pupils are equal, round, and reactive to light.   Neck:      Thyroid: No thyromegaly.   Cardiovascular:      Rate and Rhythm: Normal rate and regular rhythm.      Pulses: Normal pulses.      Heart sounds: Normal heart sounds.   Pulmonary:      Effort: Pulmonary effort is normal.      Breath sounds: Normal breath sounds.   Musculoskeletal:      Cervical back: Normal range of motion and neck supple.   Skin:     General: Skin is warm.   Neurological:      General: No focal deficit present.      Mental Status: He is alert and oriented to person, place, and time. Mental

## 2024-12-30 ENCOUNTER — PATIENT MESSAGE (OUTPATIENT)
Dept: FAMILY MEDICINE CLINIC | Age: 59
End: 2024-12-30

## 2024-12-30 DIAGNOSIS — R06.2 WHEEZING: Primary | ICD-10-CM

## 2025-01-02 RX ORDER — ALBUTEROL SULFATE 0.83 MG/ML
2.5 SOLUTION RESPIRATORY (INHALATION) 4 TIMES DAILY PRN
Qty: 120 EACH | Refills: 3 | Status: SHIPPED | OUTPATIENT
Start: 2025-01-02

## 2025-01-02 RX ORDER — METHYLPREDNISOLONE 4 MG/1
TABLET ORAL
Qty: 21 TABLET | Refills: 0 | Status: SHIPPED | OUTPATIENT
Start: 2025-01-02 | End: 2025-01-08

## 2025-01-10 ENCOUNTER — TELEPHONE (OUTPATIENT)
Dept: PHARMACY | Facility: CLINIC | Age: 60
End: 2025-01-10

## 2025-01-10 NOTE — TELEPHONE ENCOUNTER
**Patient is [facility]**     2025 Annual Pharmacist Visit    Called patient to schedule 2025 yearly pharmacist appointment to discuss medications for Diabetes Management Program.    Spoke to patient and appointment scheduled for 1/16/25 at 11:00am.            Michelle Rodrigues [facility]  Clinical Pharmacy   Phone: [phone], option #3       For Pharmacy Admin Tracking Only    Program: [address]  CPA in place:  No  Recommendation Provided To: Patient/Caregiver: 1 via Telephone  Intervention Detail: Scheduled Appointment  Intervention Accepted By: Patient/Caregiver: 1  Gap Closed?: Yes   Time Spent (min): 5

## 2025-01-16 ENCOUNTER — TELEPHONE (OUTPATIENT)
Dept: PHARMACY | Facility: CLINIC | Age: 60
End: 2025-01-16

## 2025-01-16 NOTE — TELEPHONE ENCOUNTER
Considerations:  - Blood Pressure Goal:   Patient prescribed a ACEi/ARB:yes - lisinopril. Appears is past due to refill - -patient reports he still has current supply, had extra from when dose was decreased.  - Lipids:  Patient prescribed a statin:yes - pravastatin 40mg, LDL <70  - Other: currently smoking. States he had not smoked at all while he was sick with respiratory illness @month ago, so he knows he can quit and can quit cold turkey. Declines/defers any other assistance at this time.    PLAN:  - DM program gaps identified:   Requirements recommended to be completed by : Provider Visit for DM (1st) and A1c (1st)   Requirements due by : Provider visit for DM (2nd), ACC/diabetes educator visit (if A1c over 8%), A1c (2nd), Lipid panel, Urine albumin/creatinine ratio, Influenza vaccination for upcoming season, and Medication adherence over 70%  - Education to patient: Overview of Diabetes program- Benefits/Requirements. Discussed [employer] and program benefit - patient did not use Chard Adrian, so it seems he did not use the program benefit last year. Also discussed adherence strategy for weekend insulin - he will try setting alarm on his phone.    Rosalina Figueroa, PharmD, [facility]  Population Health Pharmacist  [facility] Clinical [facility]acy  Department, toll free: [phone], option 3    =======================================================   For Pharmacy Admin Tracking Only    Program: [employer]  CPA in place:  No  Recommendation Provided To: Patient/Caregiver: 1 via Telephone  Intervention Detail: Adherence Monitorin  Intervention Accepted By: Patient/Caregiver: 1  Gap Closed?: Yes   Time Spent (min):  40

## 2025-02-13 ENCOUNTER — PATIENT MESSAGE (OUTPATIENT)
Dept: FAMILY MEDICINE CLINIC | Age: 60
End: 2025-02-13

## 2025-02-27 DIAGNOSIS — E11.42 TYPE 2 DIABETES MELLITUS WITH PERIPHERAL NEUROPATHY (HCC): ICD-10-CM

## 2025-02-27 DIAGNOSIS — E11.42 TYPE 2 DIABETES MELLITUS WITH PERIPHERAL NEUROPATHY (HCC): Chronic | ICD-10-CM

## 2025-02-27 DIAGNOSIS — J40 BRONCHITIS: ICD-10-CM

## 2025-02-27 DIAGNOSIS — K21.9 GASTROESOPHAGEAL REFLUX DISEASE WITHOUT ESOPHAGITIS: ICD-10-CM

## 2025-02-27 DIAGNOSIS — E78.5 HYPERLIPIDEMIA, UNSPECIFIED HYPERLIPIDEMIA TYPE: Primary | ICD-10-CM

## 2025-02-27 DIAGNOSIS — F32.A DEPRESSION, UNSPECIFIED DEPRESSION TYPE: ICD-10-CM

## 2025-02-27 RX ORDER — INSULIN GLARGINE 300 U/ML
INJECTION, SOLUTION SUBCUTANEOUS
Qty: 3 ADJUSTABLE DOSE PRE-FILLED PEN SYRINGE | Refills: 1 | Status: SHIPPED | OUTPATIENT
Start: 2025-02-27

## 2025-02-27 RX ORDER — ESCITALOPRAM OXALATE 10 MG/1
10 TABLET ORAL DAILY
Qty: 90 TABLET | Refills: 1 | Status: SHIPPED | OUTPATIENT
Start: 2025-02-27

## 2025-02-27 RX ORDER — ALBUTEROL SULFATE 90 UG/1
2 INHALANT RESPIRATORY (INHALATION) 4 TIMES DAILY PRN
Qty: 3 EACH | Refills: 0 | Status: SHIPPED | OUTPATIENT
Start: 2025-02-27

## 2025-02-27 RX ORDER — OMEPRAZOLE 20 MG/1
20 CAPSULE, DELAYED RELEASE ORAL DAILY
Qty: 90 CAPSULE | Refills: 1 | Status: SHIPPED | OUTPATIENT
Start: 2025-02-27

## 2025-02-27 RX ORDER — LISINOPRIL 5 MG/1
5 TABLET ORAL DAILY
Qty: 30 TABLET | Refills: 0 | Status: SHIPPED | OUTPATIENT
Start: 2025-02-27

## 2025-02-27 RX ORDER — PRAVASTATIN SODIUM 40 MG
TABLET ORAL
Qty: 90 TABLET | Refills: 1 | Status: SHIPPED | OUTPATIENT
Start: 2025-02-27

## 2025-02-27 NOTE — TELEPHONE ENCOUNTER
30 day refill given. Patient needs follow up with Dr. Huitron scheduled.     Requesting medication refill. Please approve or deny this request.    Rx requested:  Requested Prescriptions     Pending Prescriptions Disp Refills    lisinopril (PRINIVIL;ZESTRIL) 5 MG tablet 90 tablet 0     Sig: Take 1 tablet by mouth daily         Last Office Visit:   2/7/2024      Next Visit Date:  Future Appointments   Date Time Provider Department Center   3/20/2025  9:15 AM Gabbie Barnes, NATALEE - CNP [facility] ECC DEP

## 2025-03-18 SDOH — ECONOMIC STABILITY: FOOD INSECURITY: WITHIN THE PAST 12 MONTHS, YOU WORRIED THAT YOUR FOOD WOULD RUN OUT BEFORE YOU GOT MONEY TO BUY MORE.: NEVER TRUE

## 2025-03-18 SDOH — ECONOMIC STABILITY: INCOME INSECURITY: IN THE LAST 12 MONTHS, WAS THERE A TIME WHEN YOU WERE NOT ABLE TO PAY THE MORTGAGE OR RENT ON TIME?: NO

## 2025-03-18 SDOH — ECONOMIC STABILITY: FOOD INSECURITY: WITHIN THE PAST 12 MONTHS, THE FOOD YOU BOUGHT JUST DIDN'T LAST AND YOU DIDN'T HAVE MONEY TO GET MORE.: NEVER TRUE

## 2025-03-18 SDOH — ECONOMIC STABILITY: TRANSPORTATION INSECURITY
IN THE PAST 12 MONTHS, HAS THE LACK OF TRANSPORTATION KEPT YOU FROM MEDICAL APPOINTMENTS OR FROM GETTING MEDICATIONS?: YES

## 2025-03-18 ASSESSMENT — PATIENT HEALTH QUESTIONNAIRE - PHQ9
8. MOVING OR SPEAKING SO SLOWLY THAT OTHER PEOPLE COULD HAVE NOTICED. OR THE OPPOSITE, BEING SO FIGETY OR RESTLESS THAT YOU HAVE BEEN MOVING AROUND A LOT MORE THAN USUAL: NOT AT ALL
2. FEELING DOWN, DEPRESSED OR HOPELESS: NOT AT ALL
1. LITTLE INTEREST OR PLEASURE IN DOING THINGS: NOT AT ALL
2. FEELING DOWN, DEPRESSED OR HOPELESS: NOT AT ALL
SUM OF ALL RESPONSES TO PHQ QUESTIONS 1-9: 2
1. LITTLE INTEREST OR PLEASURE IN DOING THINGS: NOT AT ALL
3. TROUBLE FALLING OR STAYING ASLEEP: NOT AT ALL
10. IF YOU CHECKED OFF ANY PROBLEMS, HOW DIFFICULT HAVE THESE PROBLEMS MADE IT FOR YOU TO DO YOUR WORK, TAKE CARE OF THINGS AT HOME, OR GET ALONG WITH OTHER PEOPLE: NOT DIFFICULT AT ALL
5. POOR APPETITE OR OVEREATING: NOT AT ALL
6. FEELING BAD ABOUT YOURSELF - OR THAT YOU ARE A FAILURE OR HAVE LET YOURSELF OR YOUR FAMILY DOWN: NOT AT ALL
SUM OF ALL RESPONSES TO PHQ QUESTIONS 1-9: 2
4. FEELING TIRED OR HAVING LITTLE ENERGY: SEVERAL DAYS
4. FEELING TIRED OR HAVING LITTLE ENERGY: SEVERAL DAYS
9. THOUGHTS THAT YOU WOULD BE BETTER OFF DEAD, OR OF HURTING YOURSELF: NOT AT ALL
9. THOUGHTS THAT YOU WOULD BE BETTER OFF DEAD, OR OF HURTING YOURSELF: NOT AT ALL
3. TROUBLE FALLING OR STAYING ASLEEP: NOT AT ALL
6. FEELING BAD ABOUT YOURSELF - OR THAT YOU ARE A FAILURE OR HAVE LET YOURSELF OR YOUR FAMILY DOWN: NOT AT ALL
7. TROUBLE CONCENTRATING ON THINGS, SUCH AS READING THE NEWSPAPER OR WATCHING TELEVISION: SEVERAL DAYS
5. POOR APPETITE OR OVEREATING: NOT AT ALL
SUM OF ALL RESPONSES TO PHQ QUESTIONS 1-9: 2
7. TROUBLE CONCENTRATING ON THINGS, SUCH AS READING THE NEWSPAPER OR WATCHING TELEVISION: SEVERAL DAYS
8. MOVING OR SPEAKING SO SLOWLY THAT OTHER PEOPLE COULD HAVE NOTICED. OR THE OPPOSITE - BEING SO FIDGETY OR RESTLESS THAT YOU HAVE BEEN MOVING AROUND A LOT MORE THAN USUAL: NOT AT ALL
10. IF YOU CHECKED OFF ANY PROBLEMS, HOW DIFFICULT HAVE THESE PROBLEMS MADE IT FOR YOU TO DO YOUR WORK, TAKE CARE OF THINGS AT HOME, OR GET ALONG WITH OTHER PEOPLE: NOT DIFFICULT AT ALL
SUM OF ALL RESPONSES TO PHQ QUESTIONS 1-9: 2
SUM OF ALL RESPONSES TO PHQ QUESTIONS 1-9: 2

## 2025-03-20 ENCOUNTER — OFFICE VISIT (OUTPATIENT)
Dept: FAMILY MEDICINE CLINIC | Age: 60
End: 2025-03-20
Payer: COMMERCIAL

## 2025-03-20 VITALS
OXYGEN SATURATION: 96 % | HEART RATE: 82 BPM | SYSTOLIC BLOOD PRESSURE: 158 MMHG | TEMPERATURE: 97.8 F | BODY MASS INDEX: 38.55 KG/M2 | HEIGHT: 65 IN | DIASTOLIC BLOOD PRESSURE: 88 MMHG | WEIGHT: 231.4 LBS

## 2025-03-20 DIAGNOSIS — E11.42 TYPE 2 DIABETES MELLITUS WITH PERIPHERAL NEUROPATHY (HCC): ICD-10-CM

## 2025-03-20 DIAGNOSIS — J44.9 CHRONIC OBSTRUCTIVE PULMONARY DISEASE, UNSPECIFIED COPD TYPE (HCC): ICD-10-CM

## 2025-03-20 DIAGNOSIS — Z79.4 TYPE 2 DIABETES MELLITUS WITH MICROALBUMINURIA, WITH LONG-TERM CURRENT USE OF INSULIN (HCC): Primary | ICD-10-CM

## 2025-03-20 DIAGNOSIS — R80.9 TYPE 2 DIABETES MELLITUS WITH MICROALBUMINURIA, WITH LONG-TERM CURRENT USE OF INSULIN (HCC): ICD-10-CM

## 2025-03-20 DIAGNOSIS — R80.9 TYPE 2 DIABETES MELLITUS WITH MICROALBUMINURIA, WITH LONG-TERM CURRENT USE OF INSULIN (HCC): Primary | ICD-10-CM

## 2025-03-20 DIAGNOSIS — R53.83 FATIGUE, UNSPECIFIED TYPE: ICD-10-CM

## 2025-03-20 DIAGNOSIS — Z12.5 SCREENING PSA (PROSTATE SPECIFIC ANTIGEN): ICD-10-CM

## 2025-03-20 DIAGNOSIS — E78.5 HYPERLIPIDEMIA, UNSPECIFIED HYPERLIPIDEMIA TYPE: ICD-10-CM

## 2025-03-20 DIAGNOSIS — K21.9 GASTROESOPHAGEAL REFLUX DISEASE WITHOUT ESOPHAGITIS: ICD-10-CM

## 2025-03-20 DIAGNOSIS — Z79.4 TYPE 2 DIABETES MELLITUS WITH MICROALBUMINURIA, WITH LONG-TERM CURRENT USE OF INSULIN (HCC): ICD-10-CM

## 2025-03-20 DIAGNOSIS — E11.29 TYPE 2 DIABETES MELLITUS WITH MICROALBUMINURIA, WITH LONG-TERM CURRENT USE OF INSULIN (HCC): Primary | ICD-10-CM

## 2025-03-20 DIAGNOSIS — E11.29 TYPE 2 DIABETES MELLITUS WITH MICROALBUMINURIA, WITH LONG-TERM CURRENT USE OF INSULIN (HCC): ICD-10-CM

## 2025-03-20 DIAGNOSIS — M65.311 TRIGGER FINGER OF RIGHT THUMB: ICD-10-CM

## 2025-03-20 LAB
ALBUMIN SERPL-MCNC: 4.4 G/DL (ref 3.5–4.6)
ALP SERPL-CCNC: 70 U/L (ref 35–104)
ALT SERPL-CCNC: 14 U/L (ref 0–41)
ANION GAP SERPL CALCULATED.3IONS-SCNC: 12 MEQ/L (ref 9–15)
AST SERPL-CCNC: 9 U/L (ref 0–40)
BASOPHILS # BLD: 0.1 K/UL (ref 0–0.2)
BASOPHILS NFR BLD: 1 %
BILIRUB SERPL-MCNC: 0.3 MG/DL (ref 0.2–0.7)
BUN SERPL-MCNC: 15 MG/DL (ref 6–20)
CALCIUM SERPL-MCNC: 9.5 MG/DL (ref 8.5–9.9)
CHLORIDE SERPL-SCNC: 98 MEQ/L (ref 95–107)
CHOLEST SERPL-MCNC: 147 MG/DL (ref 0–199)
CO2 SERPL-SCNC: 24 MEQ/L (ref 20–31)
CREAT SERPL-MCNC: 0.73 MG/DL (ref 0.7–1.2)
CREAT UR-MCNC: 54.4 MG/DL
EOSINOPHIL # BLD: 0.2 K/UL (ref 0–0.7)
EOSINOPHIL NFR BLD: 1.4 %
ERYTHROCYTE [DISTWIDTH] IN BLOOD BY AUTOMATED COUNT: 14.3 % (ref 11.5–14.5)
GLOBULIN SER CALC-MCNC: 3.5 G/DL (ref 2.3–3.5)
GLUCOSE SERPL-MCNC: 104 MG/DL (ref 70–99)
HCT VFR BLD AUTO: 55 % (ref 42–52)
HDLC SERPL-MCNC: 33 MG/DL (ref 40–59)
HGB BLD-MCNC: 19.1 G/DL (ref 14–18)
LDLC SERPL CALC-MCNC: 73 MG/DL (ref 0–129)
LYMPHOCYTES # BLD: 3.2 K/UL (ref 1–4.8)
LYMPHOCYTES NFR BLD: 25.4 %
MCH RBC QN AUTO: 31.1 PG (ref 27–31.3)
MCHC RBC AUTO-ENTMCNC: 34.7 % (ref 33–37)
MCV RBC AUTO: 89.4 FL (ref 79–92.2)
MICROALBUMIN UR-MCNC: 66.6 MG/DL
MICROALBUMIN/CREAT UR-RTO: 1224.3 MG/G (ref 0–30)
MONOCYTES # BLD: 1 K/UL (ref 0.2–0.8)
MONOCYTES NFR BLD: 8 %
NEUTROPHILS # BLD: 7.9 K/UL (ref 1.4–6.5)
NEUTS SEG NFR BLD: 63.3 %
PLATELET # BLD AUTO: 232 K/UL (ref 130–400)
POTASSIUM SERPL-SCNC: 4.7 MEQ/L (ref 3.4–4.9)
PROT SERPL-MCNC: 7.9 G/DL (ref 6.3–8)
PSA SERPL-MCNC: 1.13 NG/ML (ref 0–4)
RBC # BLD AUTO: 6.15 M/UL (ref 4.7–6.1)
SODIUM SERPL-SCNC: 134 MEQ/L (ref 135–144)
TRIGL SERPL-MCNC: 203 MG/DL (ref 0–150)
WBC # BLD AUTO: 12.6 K/UL (ref 4.8–10.8)

## 2025-03-20 PROCEDURE — 99214 OFFICE O/P EST MOD 30 MIN: CPT | Performed by: NURSE PRACTITIONER

## 2025-03-20 RX ORDER — LISINOPRIL 10 MG/1
10 TABLET ORAL DAILY
Qty: 90 TABLET | Refills: 1 | Status: SHIPPED | OUTPATIENT
Start: 2025-03-20

## 2025-03-20 RX ORDER — INSULIN GLARGINE 300 U/ML
INJECTION, SOLUTION SUBCUTANEOUS
Qty: 3 ADJUSTABLE DOSE PRE-FILLED PEN SYRINGE | Refills: 1 | Status: SHIPPED | OUTPATIENT
Start: 2025-03-20

## 2025-03-20 ASSESSMENT — ENCOUNTER SYMPTOMS
COUGH: 1
CONSTIPATION: 0
SHORTNESS OF BREATH: 1
DIARRHEA: 0

## 2025-03-20 NOTE — PROGRESS NOTES
Subjective  Chief Complaint   Patient presents with    Medication Refill     Does need refills, States that he will let Gabbie know    3 Month Follow-Up     Would like to know if he can make this a Be-Well appt.    Diabetes     No Concerns    Health Maintenance     States that he is going to schedule Lung Screening, wasn't able to do that due to being sick    Blood Work     Labs, Pended       HPI    History of Present Illness  The patient is a 59-year-old male who presents for evaluation of diabetes, hypertension, and trigger thumb.    He reports no knowledge of his current blood glucose levels and is not experiencing any symptoms indicative of hyperglycemia. He has been adhering to his medication regimen without any complications and has been tolerating the Mounjaro 10 mg dosage well. He expresses a desire to increase the Mounjaro dosage if there is an elevation in his A1c levels. He accidentally wasted a dose of Toujeo yesterday due to forgetting to remove the cap. He is currently on Mounjaro 10 mg and Toujeo.    He does not monitor his blood pressure at home and does not possess a blood pressure cuff. He acknowledges a high salt intake, consuming approximately 3 packs per day, but has no history of hypertension. He is currently on lisinopril 5 mg for kidney protection due to his diabetes.    He reports experiencing changes in his vision, with difficulty reading close objects and viewing television from a distance. His last ophthalmological examination was conducted in the summer of 2024.    He has been experiencing persistent congestion since December 2024, which has prevented him from undergoing a lung screening CT scan. He reports no current dyspnea but notes mild lower respiratory congestion. He is seeking an RSV vaccine, which he was unable to receive prior to his illness.    He reports a trigger thumb but no clicking or getting stuck.    MEDICATIONS  Toujeo, Mounjaro 10 mg, lisinopril 5 mg    Patient Active

## 2025-03-21 LAB
ESTIMATED AVERAGE GLUCOSE: 163 MG/DL
HBA1C MFR BLD: 7.3 % (ref 4–6)

## 2025-03-23 ENCOUNTER — RESULTS FOLLOW-UP (OUTPATIENT)
Dept: FAMILY MEDICINE CLINIC | Age: 60
End: 2025-03-23

## 2025-03-24 NOTE — RESULT ENCOUNTER NOTE
Pt aware of results. States that yes he is still not feeling well. Also asking about the result for his albumin/creatinine ratio.

## 2025-03-25 ENCOUNTER — TELEPHONE (OUTPATIENT)
Dept: ORTHOPEDIC SURGERY | Age: 60
End: 2025-03-25

## 2025-03-25 NOTE — TELEPHONE ENCOUNTER
Patient called would like to know before he schedules his appointment he has a question. If he is diabetic will an injection be administered please advise     Per patient doesn't want to waist time coming in if he wont be helped with an injection

## 2025-03-27 ENCOUNTER — CLINICAL DOCUMENTATION (OUTPATIENT)
Dept: PHARMACY | Facility: CLINIC | Age: 60
End: 2025-03-27

## 2025-03-27 NOTE — PROGRESS NOTES
[facility] Employee Diabetes Program - Be Well With Diabetes    Quarterly Check-in  Quarterly Reminder sent to patient for the DM Program - See Michael message or Letter for more information  Sent Michael Boyce [facility]   Population Health Clinical   [facility] Clinical Pharmacy  Department, toll free: [phone], option 3    For Pharmacy Admin Tracking Only    Program: [employer]  CPA in place:  No  Gap Closed?: No   Time Spent (min): 5    =======================================================    Mychart/Letter for participant:    Thanks so much for taking the first step towards better health.    To ensure participants are taking steps to control their condition, ongoing requirements need to be completed. To receive the Be Well With Diabetes Program 2026, the following actions must be taken:     Requirements to be completed by 12/31/25  Visit with your physician (Second yearly visit)  Second A1C  Flu vaccination (once yearly)  Taking a Statin, have a contraindication, or a Provider override  Taking an ACEi/ARB, have a contraindication, or a Provider override    Requirement due by 12/31/25 if A1C is greater than 8 percent:  Engage with a [facility] Associate Care Managers (ACM) or Clinical pharmacy specialists by phone at least 2 times, or complete some form of diabetes education through your provider, [facility], etc.    *Documentation of any requirements completed or reviewed outside of the [facility] electronic medical record will need to be faxed or emailed (fax/email info below).*    **Note: If you complete a 2025 Be Well Within Screening you can have an A1C drawn at that time at no cost to you - Advise the Be Well Within Team at your screening that you are enrolled in the Be Well With Diabetes Program and you would like to have an A1C drawn with your Be Well Within Labs**    If the missing requirements(s)

## 2025-04-01 DIAGNOSIS — E11.42 TYPE 2 DIABETES MELLITUS WITH PERIPHERAL NEUROPATHY (HCC): ICD-10-CM

## 2025-04-01 RX ORDER — TIRZEPATIDE 10 MG/.5ML
10 INJECTION, SOLUTION SUBCUTANEOUS WEEKLY
Qty: 6 ML | Refills: 1 | Status: SHIPPED | OUTPATIENT
Start: 2025-04-01

## 2025-04-01 RX ORDER — BLOOD PRESSURE TEST KIT
KIT MISCELLANEOUS
Qty: 100 EACH | Refills: 3 | Status: SHIPPED | OUTPATIENT
Start: 2025-04-01

## 2025-04-01 NOTE — TELEPHONE ENCOUNTER
Comments:     Last Office Visit (last PCP visit):   3/20/2025    Next Visit Date:  Future Appointments   Date Time Provider Department Center   4/3/2025 11:00 AM Javon Lawrence MD [facility]   4/10/2025  9:30 AM [facility] CT ROOM 1 [facility]   6/19/2025  8:30 AM Gabbie Barnes, APRN - CNP [facility] ECC DEP       **If hasn't been seen in over a year OR hasn't followed up according to last diabetes/ADHD visit, make appointment for patient before sending refill to provider.    Rx requested:  Requested Prescriptions     Pending Prescriptions Disp Refills    Alcohol Swabs PADS 100 each 3     Sig: Use to test blood sugars once a day as directed    Tirzepatide (MOUNJARO) 10 MG/0.5ML SOAJ 6 mL 1     Sig: Inject 10 mg into the skin once a week

## 2025-04-02 SDOH — HEALTH STABILITY: PHYSICAL HEALTH: ON AVERAGE, HOW MANY DAYS PER WEEK DO YOU ENGAGE IN MODERATE TO STRENUOUS EXERCISE (LIKE A BRISK WALK)?: 0 DAYS

## 2025-04-02 SDOH — HEALTH STABILITY: PHYSICAL HEALTH: ON AVERAGE, HOW MANY MINUTES DO YOU ENGAGE IN EXERCISE AT THIS LEVEL?: 0 MIN

## 2025-04-06 ENCOUNTER — PATIENT MESSAGE (OUTPATIENT)
Dept: FAMILY MEDICINE CLINIC | Age: 60
End: 2025-04-06

## 2025-04-07 SDOH — HEALTH STABILITY: PHYSICAL HEALTH: ON AVERAGE, HOW MANY DAYS PER WEEK DO YOU ENGAGE IN MODERATE TO STRENUOUS EXERCISE (LIKE A BRISK WALK)?: 0 DAYS

## 2025-04-07 SDOH — HEALTH STABILITY: PHYSICAL HEALTH: ON AVERAGE, HOW MANY MINUTES DO YOU ENGAGE IN EXERCISE AT THIS LEVEL?: 0 MIN

## 2025-04-10 ENCOUNTER — OFFICE VISIT (OUTPATIENT)
Age: 60
End: 2025-04-10

## 2025-04-10 ENCOUNTER — HOSPITAL ENCOUNTER (OUTPATIENT)
Dept: ORTHOPEDIC SURGERY | Age: 60
Discharge: HOME | End: 2025-04-12
Payer: COMMERCIAL

## 2025-04-10 ENCOUNTER — HOSPITAL ENCOUNTER (OUTPATIENT)
Dept: CT IMAGING | Age: 60
Discharge: HOME | End: 2025-04-12
Payer: COMMERCIAL

## 2025-04-10 VITALS — HEIGHT: 65 IN | WEIGHT: 240 LBS | BODY MASS INDEX: 39.99 KG/M2

## 2025-04-10 VITALS
OXYGEN SATURATION: 96 % | HEART RATE: 86 BPM | SYSTOLIC BLOOD PRESSURE: 126 MMHG | DIASTOLIC BLOOD PRESSURE: 88 MMHG | BODY MASS INDEX: 39.99 KG/M2 | TEMPERATURE: 97.1 F | WEIGHT: 240 LBS | HEIGHT: 65 IN

## 2025-04-10 DIAGNOSIS — G89.29 CHRONIC PAIN OF RIGHT THUMB: ICD-10-CM

## 2025-04-10 DIAGNOSIS — Z87.891 PERSONAL HISTORY OF TOBACCO USE: ICD-10-CM

## 2025-04-10 DIAGNOSIS — M65.311 TRIGGER THUMB, RIGHT THUMB: Primary | ICD-10-CM

## 2025-04-10 DIAGNOSIS — M79.644 CHRONIC PAIN OF RIGHT THUMB: ICD-10-CM

## 2025-04-10 PROCEDURE — 73130 X-RAY EXAM OF HAND: CPT

## 2025-04-10 PROCEDURE — 71271 CT THORAX LUNG CANCER SCR C-: CPT

## 2025-04-10 RX ORDER — BETAMETHASONE SODIUM PHOSPHATE AND BETAMETHASONE ACETATE 3; 3 MG/ML; MG/ML
3 INJECTION, SUSPENSION INTRA-ARTICULAR; INTRALESIONAL; INTRAMUSCULAR; SOFT TISSUE ONCE
Status: COMPLETED | OUTPATIENT
Start: 2025-04-10 | End: 2025-04-10

## 2025-04-10 RX ORDER — LIDOCAINE HYDROCHLORIDE 10 MG/ML
0.5 INJECTION, SOLUTION INFILTRATION; PERINEURAL ONCE
Status: COMPLETED | OUTPATIENT
Start: 2025-04-10 | End: 2025-04-10

## 2025-04-10 RX ADMIN — BETAMETHASONE SODIUM PHOSPHATE AND BETAMETHASONE ACETATE 3 MG: 3; 3 INJECTION, SUSPENSION INTRA-ARTICULAR; INTRALESIONAL; INTRAMUSCULAR; SOFT TISSUE at 12:05

## 2025-04-10 RX ADMIN — LIDOCAINE HYDROCHLORIDE 0.5 ML: 10 INJECTION, SOLUTION INFILTRATION; PERINEURAL at 12:05

## 2025-04-10 NOTE — PROGRESS NOTES
Laboratory Studies:     Diagnostic Imaging Studies:    X-rays of the right hand were reviewed today.  These include PA oblique and lateral views.  There is some mild joint space narrowing at the MCP joint of the thumb along the radial aspect.  No evidence of fracture or dislocation.      Assessment / Plan:      Diagnosis Orders   1. Trigger thumb, right thumb  betamethasone acetate-betamethasone sodium phosphate (CELESTONE) injection 3 mg    lidocaine 1 % injection 0.5 mL    INJECT TENDON SHEATH/LIGAMENT      2. Chronic pain of right thumb  XR HAND RIGHT (MIN 3 VIEWS)         Orders Placed This Encounter   Procedures    INJECT TENDON SHEATH/LIGAMENT    XR HAND RIGHT (MIN 3 VIEWS)     Standing Status:   Future     Number of Occurrences:   1     Expected Date:   4/10/2025     Expiration Date:   4/4/2026     Orders Placed This Encounter   Medications    betamethasone acetate-betamethasone sodium phosphate (CELESTONE) injection 3 mg    lidocaine 1 % injection 0.5 mL       Right hand trigger thumb- the patient´s history and exam are consistent with trigger thumb. We discussed the natural history and options for treatment including observation, splinting, use of anti inflammatories to treat the pain and inflammation, steroid injection, and surgery. Mild, early cases often resolve spontaneously or do not bother the patient significantly. Use of a night extension splint may help minimize morning locking. Unless the IP joint remains locked, in either flexion or extension, for several weeks, delayed treatment usually does not significantly change either the options available or their results. Long-term relief can be attained in most affected digits with one to three steroid injections. Sometimes these are less effective in more severe cases but my preference is to try at least one injection before proceeding with surgical release. I generally reserve surgical release of the A1 pulley for symptoms that persist despite

## 2025-04-11 ENCOUNTER — RESULTS FOLLOW-UP (OUTPATIENT)
Dept: FAMILY MEDICINE CLINIC | Age: 60
End: 2025-04-11

## 2025-04-14 ENCOUNTER — TELEPHONE (OUTPATIENT)
Dept: FAMILY MEDICINE CLINIC | Age: 60
End: 2025-04-14

## 2025-04-14 DIAGNOSIS — E11.42 TYPE 2 DIABETES MELLITUS WITH PERIPHERAL NEUROPATHY (HCC): ICD-10-CM

## 2025-04-14 RX ORDER — INSULIN GLARGINE 300 U/ML
INJECTION, SOLUTION SUBCUTANEOUS
Qty: 6 ADJUSTABLE DOSE PRE-FILLED PEN SYRINGE | Refills: 1 | Status: SHIPPED | OUTPATIENT
Start: 2025-04-14 | End: 2025-04-17 | Stop reason: SDUPTHER

## 2025-04-14 NOTE — TELEPHONE ENCOUNTER
[facility] called  Toujeo solostar 300ml needs clarification  Quantity and refills needs changed  Would like to dispense a 90 day supply    # [phone] Option 1

## 2025-04-17 DIAGNOSIS — E11.42 TYPE 2 DIABETES MELLITUS WITH PERIPHERAL NEUROPATHY (HCC): ICD-10-CM

## 2025-04-17 RX ORDER — INSULIN GLARGINE 300 U/ML
INJECTION, SOLUTION SUBCUTANEOUS
Qty: 9 ADJUSTABLE DOSE PRE-FILLED PEN SYRINGE | Refills: 1 | Status: SHIPPED | OUTPATIENT
Start: 2025-04-17

## 2025-04-17 NOTE — TELEPHONE ENCOUNTER
[facility] calling to get the 90 day fill and they stated that they filled 18 pens the last time he refilled   So they stated that he should only need 18 pens for a 90 day fill.

## 2025-04-21 RX ORDER — METOPROLOL SUCCINATE 25 MG/1
25 TABLET, EXTENDED RELEASE ORAL DAILY
Qty: 90 TABLET | Refills: 1 | Status: SHIPPED | OUTPATIENT
Start: 2025-04-21

## 2025-04-21 RX ORDER — ASPIRIN 81 MG/1
81 TABLET ORAL DAILY
Qty: 90 TABLET | Refills: 1 | Status: SHIPPED | OUTPATIENT
Start: 2025-04-21

## 2025-04-21 RX ORDER — EMPAGLIFLOZIN AND METFORMIN HYDROCHLORIDE 12.5; 1 MG/1; MG/1
1 TABLET ORAL 2 TIMES DAILY WITH MEALS
Qty: 180 TABLET | Refills: 1 | Status: SHIPPED | OUTPATIENT
Start: 2025-04-21

## 2025-04-21 NOTE — TELEPHONE ENCOUNTER
Comments:     Last Office Visit (last PCP visit):   3/20/2025    Next Visit Date:  Future Appointments   Date Time Provider Department Center   6/19/2025  8:30 AM Gabbie Barnes APRN - CNP [facility] DEP       **If hasn't been seen in over a year OR hasn't followed up according to last diabetes/ADHD visit, make appointment for patient before sending refill to provider.    Rx requested:  Requested Prescriptions     Pending Prescriptions Disp Refills    aspirin 81 MG EC tablet 90 tablet 1     Sig: Take 1 tablet by mouth daily    Empagliflozin-metFORMIN HCl (SYNJARDY) 12.5-1000 MG TABS 180 tablet 1     Sig: Take 1 tablet by mouth 2 times daily (with meals)

## 2025-04-21 NOTE — TELEPHONE ENCOUNTER
Comments: Gabbie jose please fill    Last Office Visit (last PCP visit):   2/7/2024    Next Visit Date:  Future Appointments   Date Time Provider Department Center   6/19/2025  8:30 AM Gabbie Barnes APRN - CNP [facility] DEP       **If hasn't been seen in over a year OR hasn't followed up according to last diabetes/ADHD visit, make appointment for patient before sending refill to provider.    Rx requested:  Requested Prescriptions     Pending Prescriptions Disp Refills    metoprolol succinate (TOPROL XL) 25 MG extended release tablet 90 tablet 0     Sig: Take 1 tablet by mouth daily Patient needs appointment for more refills.

## 2025-05-29 ENCOUNTER — OFFICE VISIT (OUTPATIENT)
Age: 60
End: 2025-05-29
Payer: COMMERCIAL

## 2025-05-29 ENCOUNTER — HOSPITAL ENCOUNTER (OUTPATIENT)
Dept: ORTHOPEDIC SURGERY | Age: 60
Discharge: HOME | End: 2025-05-31
Payer: COMMERCIAL

## 2025-05-29 VITALS
SYSTOLIC BLOOD PRESSURE: 138 MMHG | HEIGHT: 65 IN | WEIGHT: 240 LBS | HEART RATE: 88 BPM | DIASTOLIC BLOOD PRESSURE: 78 MMHG | BODY MASS INDEX: 39.99 KG/M2 | OXYGEN SATURATION: 96 %

## 2025-05-29 DIAGNOSIS — M25.562 ACUTE PAIN OF BOTH KNEES: ICD-10-CM

## 2025-05-29 DIAGNOSIS — M17.0 PRIMARY OSTEOARTHRITIS OF BOTH KNEES: Primary | ICD-10-CM

## 2025-05-29 DIAGNOSIS — M25.561 ACUTE PAIN OF BOTH KNEES: ICD-10-CM

## 2025-05-29 PROCEDURE — 73564 X-RAY EXAM KNEE 4 OR MORE: CPT

## 2025-05-29 RX ORDER — TRIAMCINOLONE ACETONIDE 40 MG/ML
80 INJECTION, SUSPENSION INTRA-ARTICULAR; INTRAMUSCULAR ONCE
Status: COMPLETED | OUTPATIENT
Start: 2025-05-29 | End: 2025-05-29

## 2025-05-29 RX ORDER — LIDOCAINE HYDROCHLORIDE 10 MG/ML
6 INJECTION, SOLUTION INFILTRATION; PERINEURAL ONCE
Status: COMPLETED | OUTPATIENT
Start: 2025-05-29 | End: 2025-05-29

## 2025-05-29 RX ADMIN — TRIAMCINOLONE ACETONIDE 80 MG: 40 INJECTION, SUSPENSION INTRA-ARTICULAR; INTRAMUSCULAR at 16:25

## 2025-05-29 RX ADMIN — LIDOCAINE HYDROCHLORIDE 6 ML: 10 INJECTION, SOLUTION INFILTRATION; PERINEURAL at 16:23

## 2025-05-29 RX ADMIN — LIDOCAINE HYDROCHLORIDE 6 ML: 10 INJECTION, SOLUTION INFILTRATION; PERINEURAL at 16:22

## 2025-05-29 RX ADMIN — TRIAMCINOLONE ACETONIDE 80 MG: 40 INJECTION, SUSPENSION INTRA-ARTICULAR; INTRAMUSCULAR at 16:24

## 2025-05-29 NOTE — PROGRESS NOTES
Subjective:      HPI:: Filipe Harper is a 59 y.o. male who presents today for evaluation of bilateral knee pain left slightly greater than the right.  Rates it as a 3.5 on the left and a 3 on the right.  He has activity related pain as well as stiffness pain that develops after sitting for prolonged period time.  He denies hip pain or neurologic symptoms.  Has recently done a lot of activity and has some soreness to the quads bilaterally.  He feels like this is related to the recent activity.  He denies mechanical locking in either knee.     I had seen him previously for trigger thumb on the right side and he notes the injection gave him significant improvement in symptoms.  He denies any triggering of the thumb at this time.    Past Medical History:   Diagnosis Date    Diabetes mellitus (HCC)     Hypertension     Sleep apnea      Past Surgical History:   Procedure Laterality Date    COLONOSCOPY N/A 1/25/2024    Colonoscopy possible biopsy/phone PAT performed by Javon Irizarry MD at [facility] OR    TONSILLECTOMY      WISDOM TOOTH EXTRACTION       Social History     Socioeconomic History    Marital status: Single     Spouse name: Not on file    Number of children: Not on file    Years of education: Not on file    Highest education level: Not on file   Occupational History    Not on file   Tobacco Use    Smoking status: Every Day     Current packs/day: 1.00     Average packs/day: 1 pack/day for 35.7 years (35.7 ttl pk-yrs)     Types: Cigarettes     Start date: 9/25/1989    Smokeless tobacco: Never   Vaping Use    Vaping status: Never Used   Substance and Sexual Activity    Alcohol use: Yes     Comment: rare    Drug use: Never    Sexual activity: Not on file   Other Topics Concern    Not on file   Social History Narrative    Not on file     Social Drivers of Health     Financial Resource Strain: High Risk (6/21/2024)    Overall Financial Resource Strain (CARDIA)     Difficulty of Paying Living Expenses: Very hard

## 2025-06-06 DIAGNOSIS — E11.42 TYPE 2 DIABETES MELLITUS WITH PERIPHERAL NEUROPATHY (HCC): ICD-10-CM

## 2025-06-06 RX ORDER — SILDENAFIL 100 MG/1
100 TABLET, FILM COATED ORAL DAILY PRN
Qty: 10 TABLET | Refills: 5 | Status: SHIPPED | OUTPATIENT
Start: 2025-06-06

## 2025-06-06 RX ORDER — LISINOPRIL 10 MG/1
10 TABLET ORAL DAILY
Qty: 90 TABLET | Refills: 1 | Status: SHIPPED | OUTPATIENT
Start: 2025-06-06

## 2025-06-06 NOTE — TELEPHONE ENCOUNTER
Comments:     Last Office Visit (last PCP visit):   3/20/2025    Next Visit Date:  Future Appointments   Date Time Provider Department Center   6/19/2025  8:30 AM Gabbie Barnes APRN - CNP [facility] DEP       **If hasn't been seen in over a year OR hasn't followed up according to last diabetes/ADHD visit, make appointment for patient before sending refill to provider.    Rx requested:  Requested Prescriptions     Pending Prescriptions Disp Refills    lisinopril (PRINIVIL;ZESTRIL) 10 MG tablet 90 tablet 1     Sig: Take 1 tablet by mouth daily

## 2025-06-06 NOTE — TELEPHONE ENCOUNTER
Comments:     Last Office Visit (last PCP visit):   3/20/2025    Next Visit Date:  Future Appointments   Date Time Provider Department Center   6/19/2025  8:30 AM Gabbie Barnes APRN - CNP [facility] DEP       **If hasn't been seen in over a year OR hasn't followed up according to last diabetes/ADHD visit, make appointment for patient before sending refill to provider.    Rx requested:  Requested Prescriptions     Pending Prescriptions Disp Refills    sildenafil (VIAGRA) 100 MG tablet 10 tablet 5     Sig: Take 1 tablet by mouth daily as needed for Erectile Dysfunction

## 2025-06-19 ENCOUNTER — OFFICE VISIT (OUTPATIENT)
Dept: FAMILY MEDICINE CLINIC | Age: 60
End: 2025-06-19
Payer: COMMERCIAL

## 2025-06-19 VITALS
HEIGHT: 65 IN | TEMPERATURE: 97.6 F | BODY MASS INDEX: 37.72 KG/M2 | SYSTOLIC BLOOD PRESSURE: 156 MMHG | OXYGEN SATURATION: 96 % | WEIGHT: 226.4 LBS | HEART RATE: 89 BPM | DIASTOLIC BLOOD PRESSURE: 78 MMHG

## 2025-06-19 DIAGNOSIS — E11.42 TYPE 2 DIABETES MELLITUS WITH PERIPHERAL NEUROPATHY (HCC): Primary | ICD-10-CM

## 2025-06-19 DIAGNOSIS — D22.9 SUSPICIOUS NEVUS: ICD-10-CM

## 2025-06-19 DIAGNOSIS — J44.9 CHRONIC OBSTRUCTIVE PULMONARY DISEASE, UNSPECIFIED COPD TYPE (HCC): ICD-10-CM

## 2025-06-19 DIAGNOSIS — E78.5 HYPERLIPIDEMIA, UNSPECIFIED HYPERLIPIDEMIA TYPE: ICD-10-CM

## 2025-06-19 DIAGNOSIS — K21.9 GASTROESOPHAGEAL REFLUX DISEASE WITHOUT ESOPHAGITIS: ICD-10-CM

## 2025-06-19 LAB — HBA1C MFR BLD: 7.8 %

## 2025-06-19 PROCEDURE — 3051F HG A1C>EQUAL 7.0%<8.0%: CPT | Performed by: NURSE PRACTITIONER

## 2025-06-19 PROCEDURE — 99214 OFFICE O/P EST MOD 30 MIN: CPT | Performed by: NURSE PRACTITIONER

## 2025-06-19 PROCEDURE — 83036 HEMOGLOBIN GLYCOSYLATED A1C: CPT | Performed by: NURSE PRACTITIONER

## 2025-06-19 RX ORDER — LISINOPRIL 20 MG/1
20 TABLET ORAL DAILY
Qty: 90 TABLET | Refills: 1 | Status: SHIPPED | OUTPATIENT
Start: 2025-06-19

## 2025-06-19 ASSESSMENT — ENCOUNTER SYMPTOMS
COUGH: 0
SHORTNESS OF BREATH: 0

## 2025-06-19 NOTE — PROGRESS NOTES
Subjective  Chief Complaint   Patient presents with    3 Month Follow-Up     No Concerns    Diabetes     No Concerns       HPI    History of Present Illness  The patient is a 59-year-old male who presents for evaluation of diabetes, hypertension, and a skin lesion.    He has been under the care of a nephrologist and was advised to discontinue naproxen due to its potential renal impact. Consequently, he transitioned to Tylenol but had to revert to naproxen in the past two days. His lisinopril dosage was increased from 5 mg to 10 mg by his cardiologist, and he believes it was further increased to 20 mg by his nephrologist.    He recently consulted an orthopedic specialist for trigger thumb and received a cortisone injection. He also had both knees injected. He was informed that these injections could elevate his blood glucose levels, but he has not been monitoring them. He suspects his blood glucose levels are high but reports feeling significantly better post-injection. He has noticed fluctuations in his vision, which he attributes to high blood glucose levels. He continues to take Mounjaro and Toujeo as prescribed.    He has identified a lesion on his skin, which he first noticed approximately 6 months ago. He reports no changes in its size or appearance, but notes a slight indentation. He has not undergone any skin checks. His father had a skin lesion removed from his scalp.    He has been experiencing dental issues but has not sought treatment due to financial constraints.    FAMILY HISTORY  His father had something removed from his head.    Patient Active Problem List    Diagnosis Date Noted    Type 2 diabetes mellitus with microalbuminuria ([facility]) 09/26/2014    Encounter for long-term (current) insulin use ([facility]) 09/26/2014    Hyperlipidemia 09/26/2014    Severe obesity (BMI 35.0-39.9) with comorbidity ([facility]) 03/30/2023    Chronic obstructive pulmonary disease ([facility]) 03/30/2023    Left leg paresthesias 02/11/2019

## 2025-07-02 ENCOUNTER — PATIENT MESSAGE (OUTPATIENT)
Dept: FAMILY MEDICINE CLINIC | Age: 60
End: 2025-07-02

## 2025-07-22 ENCOUNTER — PATIENT MESSAGE (OUTPATIENT)
Dept: FAMILY MEDICINE CLINIC | Age: 60
End: 2025-07-22

## 2025-08-22 ENCOUNTER — PATIENT MESSAGE (OUTPATIENT)
Dept: FAMILY MEDICINE CLINIC | Age: 60
End: 2025-08-22

## 2025-08-25 ENCOUNTER — CLINICAL DOCUMENTATION (OUTPATIENT)
Dept: PHARMACY | Facility: CLINIC | Age: 60
End: 2025-08-25

## 2025-08-25 DIAGNOSIS — F32.A DEPRESSION, UNSPECIFIED DEPRESSION TYPE: ICD-10-CM

## 2025-08-25 DIAGNOSIS — K21.9 GASTROESOPHAGEAL REFLUX DISEASE WITHOUT ESOPHAGITIS: ICD-10-CM

## 2025-08-25 DIAGNOSIS — E11.42 TYPE 2 DIABETES MELLITUS WITH PERIPHERAL NEUROPATHY (HCC): ICD-10-CM

## 2025-08-25 DIAGNOSIS — E78.5 HYPERLIPIDEMIA, UNSPECIFIED HYPERLIPIDEMIA TYPE: ICD-10-CM

## 2025-08-25 RX ORDER — OMEPRAZOLE 20 MG/1
20 CAPSULE, DELAYED RELEASE ORAL DAILY
Qty: 90 CAPSULE | Refills: 1 | Status: SHIPPED | OUTPATIENT
Start: 2025-08-25

## 2025-08-25 RX ORDER — ESCITALOPRAM OXALATE 10 MG/1
10 TABLET ORAL DAILY
Qty: 90 TABLET | Refills: 1 | Status: SHIPPED | OUTPATIENT
Start: 2025-08-25

## 2025-08-25 RX ORDER — INSULIN GLARGINE 300 U/ML
60 INJECTION, SOLUTION SUBCUTANEOUS NIGHTLY
Qty: 13.5 ML | Refills: 1 | Status: SHIPPED | OUTPATIENT
Start: 2025-08-25

## 2025-08-25 RX ORDER — PRAVASTATIN SODIUM 40 MG
TABLET ORAL
Qty: 90 TABLET | Refills: 1 | Status: SHIPPED | OUTPATIENT
Start: 2025-08-25

## 2025-09-04 DIAGNOSIS — E11.42 TYPE 2 DIABETES MELLITUS WITH PERIPHERAL NEUROPATHY (HCC): ICD-10-CM

## 2025-09-04 RX ORDER — TIRZEPATIDE 10 MG/.5ML
10 INJECTION, SOLUTION SUBCUTANEOUS WEEKLY
Qty: 6 ML | Refills: 1 | Status: SHIPPED | OUTPATIENT
Start: 2025-09-04

## (undated) DEVICE — JELLY,LUBE,STERILE,FLIP TOP,TUBE,2-OZ: Brand: MEDLINE

## (undated) DEVICE — TUBE SET 96 MM 64 MM H2O PERISTALTIC STD AUX CHANNEL

## (undated) DEVICE — Device: Brand: ENDO SMARTCAP

## (undated) DEVICE — GLOVE ORANGE PI 8   MSG9080

## (undated) DEVICE — ADAPTER FLSH PMP FLD MGMT GI IRRIG OFP 2 DISPOSABLE

## (undated) DEVICE — SINGLE PORT MANIFOLD: Brand: NEPTUNE 2

## (undated) DEVICE — SPONGE GZ W4XL4IN RAYON POLY CVR W/NONWOVEN FAB STRL 2/PK

## (undated) DEVICE — ENDO CARRY-ON PROCEDURE KIT INCLUDES LUBRICANT, DEFENDO OLYMPUS AIR, WATER, SUCTION, BIOPSY VALVE KIT, ENZYMATIC SPONGE, AND BASIN.: Brand: ENDO CARRY-ON PROCEDURE KIT

## (undated) DEVICE — BRUSH ENDO CLN L90.5IN SHTH DIA1.7MM BRIST DIA5-7MM 2-6MM